# Patient Record
Sex: MALE | Race: OTHER | NOT HISPANIC OR LATINO | URBAN - METROPOLITAN AREA
[De-identification: names, ages, dates, MRNs, and addresses within clinical notes are randomized per-mention and may not be internally consistent; named-entity substitution may affect disease eponyms.]

---

## 2019-02-23 ENCOUNTER — INPATIENT (INPATIENT)
Facility: HOSPITAL | Age: 64
LOS: 2 days | Discharge: HOME | End: 2019-02-26
Attending: FAMILY MEDICINE | Admitting: FAMILY MEDICINE
Payer: MEDICARE

## 2019-02-23 VITALS
OXYGEN SATURATION: 100 % | HEART RATE: 103 BPM | WEIGHT: 158.07 LBS | DIASTOLIC BLOOD PRESSURE: 96 MMHG | SYSTOLIC BLOOD PRESSURE: 156 MMHG | RESPIRATION RATE: 18 BRPM | HEIGHT: 71 IN | TEMPERATURE: 98 F

## 2019-02-23 DIAGNOSIS — Z95.1 PRESENCE OF AORTOCORONARY BYPASS GRAFT: Chronic | ICD-10-CM

## 2019-02-23 DIAGNOSIS — Z98.890 OTHER SPECIFIED POSTPROCEDURAL STATES: Chronic | ICD-10-CM

## 2019-02-23 DIAGNOSIS — I73.9 PERIPHERAL VASCULAR DISEASE, UNSPECIFIED: ICD-10-CM

## 2019-02-23 DIAGNOSIS — I10 ESSENTIAL (PRIMARY) HYPERTENSION: ICD-10-CM

## 2019-02-23 DIAGNOSIS — I82.432 ACUTE EMBOLISM AND THROMBOSIS OF LEFT POPLITEAL VEIN: ICD-10-CM

## 2019-02-23 LAB
ALBUMIN SERPL ELPH-MCNC: 3.5 G/DL — SIGNIFICANT CHANGE UP (ref 3.3–5)
ALP SERPL-CCNC: 107 U/L — SIGNIFICANT CHANGE UP (ref 40–120)
ALT FLD-CCNC: 12 U/L — SIGNIFICANT CHANGE UP (ref 12–78)
ANION GAP SERPL CALC-SCNC: 6 MMOL/L — SIGNIFICANT CHANGE UP (ref 5–17)
APTT BLD: 36.8 SEC — HIGH (ref 27.5–36.3)
AST SERPL-CCNC: 14 U/L — LOW (ref 15–37)
BASOPHILS # BLD AUTO: 0.04 K/UL — SIGNIFICANT CHANGE UP (ref 0–0.2)
BASOPHILS NFR BLD AUTO: 0.8 % — SIGNIFICANT CHANGE UP (ref 0–2)
BILIRUB SERPL-MCNC: 0.6 MG/DL — SIGNIFICANT CHANGE UP (ref 0.2–1.2)
BUN SERPL-MCNC: 20 MG/DL — SIGNIFICANT CHANGE UP (ref 7–23)
CALCIUM SERPL-MCNC: 8.8 MG/DL — SIGNIFICANT CHANGE UP (ref 8.5–10.1)
CHLORIDE SERPL-SCNC: 110 MMOL/L — HIGH (ref 96–108)
CO2 SERPL-SCNC: 26 MMOL/L — SIGNIFICANT CHANGE UP (ref 22–31)
CREAT SERPL-MCNC: 0.83 MG/DL — SIGNIFICANT CHANGE UP (ref 0.5–1.3)
EOSINOPHIL # BLD AUTO: 0.03 K/UL — SIGNIFICANT CHANGE UP (ref 0–0.5)
EOSINOPHIL NFR BLD AUTO: 0.6 % — SIGNIFICANT CHANGE UP (ref 0–6)
GLUCOSE SERPL-MCNC: 85 MG/DL — SIGNIFICANT CHANGE UP (ref 70–99)
HCT VFR BLD CALC: 33.9 % — LOW (ref 39–50)
HGB BLD-MCNC: 11.4 G/DL — LOW (ref 13–17)
IMM GRANULOCYTES NFR BLD AUTO: 0.4 % — SIGNIFICANT CHANGE UP (ref 0–1.5)
INR BLD: 1.17 RATIO — HIGH (ref 0.88–1.16)
LYMPHOCYTES # BLD AUTO: 1.6 K/UL — SIGNIFICANT CHANGE UP (ref 1–3.3)
LYMPHOCYTES # BLD AUTO: 31.2 % — SIGNIFICANT CHANGE UP (ref 13–44)
MCHC RBC-ENTMCNC: 28.8 PG — SIGNIFICANT CHANGE UP (ref 27–34)
MCHC RBC-ENTMCNC: 33.6 GM/DL — SIGNIFICANT CHANGE UP (ref 32–36)
MCV RBC AUTO: 85.6 FL — SIGNIFICANT CHANGE UP (ref 80–100)
MONOCYTES # BLD AUTO: 0.51 K/UL — SIGNIFICANT CHANGE UP (ref 0–0.9)
MONOCYTES NFR BLD AUTO: 9.9 % — SIGNIFICANT CHANGE UP (ref 2–14)
NEUTROPHILS # BLD AUTO: 2.93 K/UL — SIGNIFICANT CHANGE UP (ref 1.8–7.4)
NEUTROPHILS NFR BLD AUTO: 57.1 % — SIGNIFICANT CHANGE UP (ref 43–77)
NRBC # BLD: 0 /100 WBCS — SIGNIFICANT CHANGE UP (ref 0–0)
PLATELET # BLD AUTO: 192 K/UL — SIGNIFICANT CHANGE UP (ref 150–400)
POTASSIUM SERPL-MCNC: 3.9 MMOL/L — SIGNIFICANT CHANGE UP (ref 3.5–5.3)
POTASSIUM SERPL-SCNC: 3.9 MMOL/L — SIGNIFICANT CHANGE UP (ref 3.5–5.3)
PROT SERPL-MCNC: 7.5 GM/DL — SIGNIFICANT CHANGE UP (ref 6–8.3)
PROTHROM AB SERPL-ACNC: 13.1 SEC — HIGH (ref 10–12.9)
RBC # BLD: 3.96 M/UL — LOW (ref 4.2–5.8)
RBC # FLD: 13.2 % — SIGNIFICANT CHANGE UP (ref 10.3–14.5)
SODIUM SERPL-SCNC: 142 MMOL/L — SIGNIFICANT CHANGE UP (ref 135–145)
WBC # BLD: 5.13 K/UL — SIGNIFICANT CHANGE UP (ref 3.8–10.5)
WBC # FLD AUTO: 5.13 K/UL — SIGNIFICANT CHANGE UP (ref 3.8–10.5)

## 2019-02-23 PROCEDURE — 93010 ELECTROCARDIOGRAM REPORT: CPT

## 2019-02-23 PROCEDURE — 93971 EXTREMITY STUDY: CPT | Mod: 26,LT

## 2019-02-23 PROCEDURE — 99285 EMERGENCY DEPT VISIT HI MDM: CPT

## 2019-02-23 PROCEDURE — 73620 X-RAY EXAM OF FOOT: CPT | Mod: 26,LT

## 2019-02-23 RX ORDER — ATORVASTATIN CALCIUM 80 MG/1
10 TABLET, FILM COATED ORAL AT BEDTIME
Qty: 0 | Refills: 0 | Status: DISCONTINUED | OUTPATIENT
Start: 2019-02-23 | End: 2019-02-26

## 2019-02-23 RX ORDER — SODIUM CHLORIDE 9 MG/ML
3 INJECTION INTRAMUSCULAR; INTRAVENOUS; SUBCUTANEOUS EVERY 8 HOURS
Qty: 0 | Refills: 0 | Status: DISCONTINUED | OUTPATIENT
Start: 2019-02-23 | End: 2019-02-26

## 2019-02-23 RX ORDER — MORPHINE SULFATE 50 MG/1
2 CAPSULE, EXTENDED RELEASE ORAL THREE TIMES A DAY
Qty: 0 | Refills: 0 | Status: DISCONTINUED | OUTPATIENT
Start: 2019-02-23 | End: 2019-02-26

## 2019-02-23 RX ORDER — ENOXAPARIN SODIUM 100 MG/ML
70 INJECTION SUBCUTANEOUS EVERY 12 HOURS
Qty: 0 | Refills: 0 | Status: DISCONTINUED | OUTPATIENT
Start: 2019-02-23 | End: 2019-02-25

## 2019-02-23 RX ORDER — LISINOPRIL 2.5 MG/1
20 TABLET ORAL DAILY
Qty: 0 | Refills: 0 | Status: DISCONTINUED | OUTPATIENT
Start: 2019-02-23 | End: 2019-02-26

## 2019-02-23 RX ORDER — CLOPIDOGREL BISULFATE 75 MG/1
75 TABLET, FILM COATED ORAL DAILY
Qty: 0 | Refills: 0 | Status: DISCONTINUED | OUTPATIENT
Start: 2019-02-23 | End: 2019-02-26

## 2019-02-23 RX ORDER — IBUPROFEN 200 MG
600 TABLET ORAL EVERY 6 HOURS
Qty: 0 | Refills: 0 | Status: DISCONTINUED | OUTPATIENT
Start: 2019-02-23 | End: 2019-02-26

## 2019-02-23 RX ORDER — MORPHINE SULFATE 50 MG/1
4 CAPSULE, EXTENDED RELEASE ORAL ONCE
Qty: 0 | Refills: 0 | Status: DISCONTINUED | OUTPATIENT
Start: 2019-02-23 | End: 2019-02-23

## 2019-02-23 RX ORDER — SODIUM CHLORIDE 9 MG/ML
1000 INJECTION INTRAMUSCULAR; INTRAVENOUS; SUBCUTANEOUS ONCE
Qty: 0 | Refills: 0 | Status: COMPLETED | OUTPATIENT
Start: 2019-02-23 | End: 2019-02-23

## 2019-02-23 RX ORDER — DOCUSATE SODIUM 100 MG
100 CAPSULE ORAL DAILY
Qty: 0 | Refills: 0 | Status: DISCONTINUED | OUTPATIENT
Start: 2019-02-23 | End: 2019-02-26

## 2019-02-23 RX ORDER — AMLODIPINE BESYLATE 2.5 MG/1
5 TABLET ORAL DAILY
Qty: 0 | Refills: 0 | Status: DISCONTINUED | OUTPATIENT
Start: 2019-02-23 | End: 2019-02-26

## 2019-02-23 RX ORDER — ACETAMINOPHEN 500 MG
1000 TABLET ORAL ONCE
Qty: 0 | Refills: 0 | Status: COMPLETED | OUTPATIENT
Start: 2019-02-23 | End: 2019-02-23

## 2019-02-23 RX ADMIN — MORPHINE SULFATE 2 MILLIGRAM(S): 50 CAPSULE, EXTENDED RELEASE ORAL at 23:58

## 2019-02-23 RX ADMIN — ENOXAPARIN SODIUM 70 MILLIGRAM(S): 100 INJECTION SUBCUTANEOUS at 23:58

## 2019-02-23 RX ADMIN — MORPHINE SULFATE 4 MILLIGRAM(S): 50 CAPSULE, EXTENDED RELEASE ORAL at 19:10

## 2019-02-23 RX ADMIN — SODIUM CHLORIDE 2000 MILLILITER(S): 9 INJECTION INTRAMUSCULAR; INTRAVENOUS; SUBCUTANEOUS at 19:10

## 2019-02-23 RX ADMIN — ATORVASTATIN CALCIUM 10 MILLIGRAM(S): 80 TABLET, FILM COATED ORAL at 23:58

## 2019-02-23 RX ADMIN — Medication 400 MILLIGRAM(S): at 22:32

## 2019-02-23 RX ADMIN — Medication 1000 MILLIGRAM(S): at 22:47

## 2019-02-23 NOTE — ED ADULT NURSE REASSESSMENT NOTE - NS ED NURSE REASSESS COMMENT FT1
Report given to Melissa of throughput for admissions. Pt c/o pain to LLE at this time, Dr. Cerna notified. Will put in pain medication orders. Report given to Katia of throughput for admissions. Pt c/o pain to LLE at this time, Dr. Cerna notified. Will put in pain medication orders.

## 2019-02-23 NOTE — ED PROVIDER NOTE - OBJECTIVE STATEMENT
62y/o w/ PMHx of quadruple bypass, CVA (in 1987), HTN lisinopril, amlodipine), HC (on atorvastatin) BIBA w/ worsening pain in left leg.  Pain is associated w/ swelling and erythema starting one week ago.  Pt has not had similar episodes in the past.  Pt has surgery on the same leg for saphenous vein harvest for CABG.  Surgical site is well healed. Denies N/V/D, fever or chills. Pt has been taking Ibuprophen for pain w/o relief of symptoms.  Pain is limiting pts ability to ambulate normally. Social: pt started living w/ his son x 4mo ago. NKDA

## 2019-02-23 NOTE — H&P ADULT - PROBLEM SELECTOR PLAN 3
Continue statin, plavix  Vascular consult in the setting of acute DVT exclude concomittant arterial insufficiency  Smoking cessation counselled

## 2019-02-23 NOTE — ED ADULT NURSE REASSESSMENT NOTE - NSIMPLEMENTINTERV_GEN_ALL_ED
Implemented All Fall with Harm Risk Interventions:  [place] to call system. Call bell, personal items and telephone within reach. Instruct patient to call for assistance. Room bathroom lighting operational. Non-slip footwear when patient is off stretcher. Physically safe environment: no spills, clutter or unnecessary equipment. Stretcher in lowest position, wheels locked, appropriate side rails in place. Provide visual cue, wrist band, yellow gown, etc. Monitor gait and stability. Monitor for mental status changes and reorient to person, place, and time. Review medications for side effects contributing to fall risk. Reinforce activity limits and safety measures with patient and family. Provide visual clues: red socks.

## 2019-02-23 NOTE — H&P ADULT - PSH
S/P quadruple vessel bypass S/P quadruple vessel bypass    S/P transsphenoidal selective adenomectomy
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
## 2019-02-23 NOTE — ED PROVIDER NOTE - ENMT, MLM
Airway patent, Nasal mucosa clear. Mouth with mildly dry mucosa. and multiple missing teeth. Throat has no vesicles, no oropharyngeal exudates and uvula is midline.

## 2019-02-23 NOTE — ED PROVIDER NOTE - PROGRESS NOTE DETAILS
Anupam TRUJILLO for ED attending, Dr. Mcmahon. Doppler performed and unable to appreciated DP blood flow, will request vascular consult Scribadam TRUJILLO for ED attending, Dr. Mcmahon.  U/S is positive for DVT in left popliteal vein.  Plan for admission to hospitalist.

## 2019-02-23 NOTE — H&P ADULT - PROBLEM SELECTOR PLAN 1
Lovenox weight based BID  Vascular consult  Pain control Lovenox weight based BID  Vascular consult  Pain control  S/W and Case mgt for dispo planning

## 2019-02-23 NOTE — H&P ADULT - NSHPLABSRESULTS_GEN_ALL_CORE
11.4   5.13  )-----------( 192      ( 23 Feb 2019 19:29 )             33.9     02-23    142  |  110<H>  |  20  ----------------------------<  85  3.9   |  26  |  0.83    Ca    8.8      23 Feb 2019 19:29    TPro  7.5  /  Alb  3.5  /  TBili  0.6  /  DBili  x   /  AST  14<L>  /  ALT  12  /  AlkPhos  107  02-23        LIVER FUNCTIONS - ( 23 Feb 2019 19:29 )  Alb: 3.5 g/dL / Pro: 7.5 gm/dL / ALK PHOS: 107 U/L / ALT: 12 U/L / AST: 14 U/L / GGT: x           PT/INR - ( 23 Feb 2019 19:29 )   PT: 13.1 sec;   INR: 1.17 ratio         PTT - ( 23 Feb 2019 19:29 )  PTT:36.8 sec
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
## 2019-02-23 NOTE — ED PROVIDER NOTE - SKIN, MLM
Skin normal color for race, warm, dry.  Left leg w/ well healed incision from prior saphenous vein harvest.  Edema and erythema surrounding lower half of calf, ankle and dorsum of foot.

## 2019-02-23 NOTE — ED ADULT NURSE REASSESSMENT NOTE - NS ED NURSE REASSESS COMMENT FT1
Received report from Janna DUTTA. Received pt awake A&Ox3 sitting erect in bed with son at bedside. Pt c/o LLE pain x 1 week. History of venous insufficiency and graft. Will reassess pain scale after Morphine IVP. No acute distress noted at this time. VSS. Safety/fall precautions in place. Pending lab results and final ED disposition.

## 2019-02-23 NOTE — ED ADULT NURSE REASSESSMENT NOTE - NS ED NURSE REASSESS COMMENT FT1
Pt received from TRA Chavez. Patient resting comfortably in bed. Safety and comfort maintained. Will continue to monitor.
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
## 2019-02-23 NOTE — ED PROVIDER NOTE - CLINICAL SUMMARY MEDICAL DECISION MAKING FREE TEXT BOX
Pt w/ PMHx of CVA and CABG (quadruple bypass) w/ left leg pain.  Pt not known to have diabetes.  No recent trauma to the leg.  R/o cellulitis vs osteomyelitis vs DVT vs PVD.  Plan: labs, XR, doppler, U/S, pain management.
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
## 2019-02-23 NOTE — ED ADULT NURSE NOTE - NSIMPLEMENTINTERV_GEN_ALL_ED
Implemented All Fall with Harm Risk Interventions:  [place] to call system. Call bell, personal items and telephone within reach. Instruct patient to call for assistance. Room bathroom lighting operational. Non-slip footwear when patient is off stretcher. Physically safe environment: no spills, clutter or unnecessary equipment. Stretcher in lowest position, wheels locked, appropriate side rails in place. Provide visual cue, wrist band, yellow gown, etc. Monitor gait and stability. Monitor for mental status changes and reorient to person, place, and time. Review medications for side effects contributing to fall risk. Reinforce activity limits and safety measures with patient and family. Provide visual clues: red socks.
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
## 2019-02-23 NOTE — H&P ADULT - NSHPSOCIALHISTORY_GEN_ALL_CORE
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

## 2019-02-23 NOTE — H&P ADULT - NSHPPHYSICALEXAM_GEN_ALL_CORE
HEENT:   pupils equal and reactive, EOMI, no oropharyngeal lesions, erythema, exudates, oral thrush    NECK:   supple, no carotid bruits, no palpable lymph nodes, no thyromegaly    CV:  +S1, +S2, regular, no murmurs or rubs    RESP:   lungs clear to auscultation bilaterally, no wheezing, rales, rhonchi, good air entry bilaterally    BREAST:  not examined    GI:  abdomen soft, non-tender, non-distended, normal BS, no bruits, no abdominal masses, no palpable masses    RECTAL:  not examined    :  not examined    MSK:   normal muscle tone, no atrophy, no rigidity, no contractions    EXT:   left LE with swelling, erythema and calf tenderness    VASCULAR:  pulses equal and symmetric in the upper and lower extremities    NEURO:  AAOX3, no focal neurological deficits, follows all commands, able to move extremities spontaneously    SKIN:  no ulcers, lesions or rashes HEENT:   pupils equal and reactive, EOMI, no oropharyngeal lesions, erythema, exudates, oral thrush    NECK:   supple, no carotid bruits, no palpable lymph nodes, no thyromegaly    CV:  +S1, +S2, regular, no murmurs or rubs    RESP:   lungs clear to auscultation bilaterally, no wheezing, rales, rhonchi, good air entry bilaterally    BREAST:  not examined    GI:  abdomen soft, non-tender, non-distended, normal BS, no bruits, no abdominal masses, no palpable masses    RECTAL:  not examined    :  not examined    MSK:   normal muscle tone, no atrophy, no rigidity, no contractions    EXT:   left LE with swelling, erythema and ankle/lower leg tenderness, medial scar ankle to just above left knee, muscle atrophy and contracture left upper and lower extremities. s/p toe smputation with hyperpigmentation and swelling of foot    NEURO:  AAOX3, no focal neurological deficits, follows all commands, able to move right extremities spontaneously, left extremities paretic    SKIN:  no ulcers, lesions or rashes

## 2019-02-24 LAB
CHOLEST SERPL-MCNC: 177 MG/DL — SIGNIFICANT CHANGE UP (ref 10–199)
HBA1C BLD-MCNC: 5.6 % — SIGNIFICANT CHANGE UP (ref 4–5.6)
HCV AB S/CO SERPL IA: 0.15 S/CO — SIGNIFICANT CHANGE UP (ref 0–0.79)
HCV AB SERPL-IMP: SIGNIFICANT CHANGE UP
HDLC SERPL-MCNC: 50 MG/DL — SIGNIFICANT CHANGE UP
LIPID PNL WITH DIRECT LDL SERPL: 115 MG/DL — HIGH
TOTAL CHOLESTEROL/HDL RATIO MEASUREMENT: 3.5 RATIO — SIGNIFICANT CHANGE UP (ref 3.4–9.6)
TRIGL SERPL-MCNC: 62 MG/DL — SIGNIFICANT CHANGE UP (ref 10–149)

## 2019-02-24 PROCEDURE — 99223 1ST HOSP IP/OBS HIGH 75: CPT

## 2019-02-24 PROCEDURE — 99231 SBSQ HOSP IP/OBS SF/LOW 25: CPT

## 2019-02-24 RX ORDER — MORPHINE SULFATE 50 MG/1
2 CAPSULE, EXTENDED RELEASE ORAL ONCE
Qty: 0 | Refills: 0 | Status: DISCONTINUED | OUTPATIENT
Start: 2019-02-24 | End: 2019-02-24

## 2019-02-24 RX ORDER — TRAMADOL HYDROCHLORIDE 50 MG/1
50 TABLET ORAL ONCE
Qty: 0 | Refills: 0 | Status: DISCONTINUED | OUTPATIENT
Start: 2019-02-24 | End: 2019-02-24

## 2019-02-24 RX ORDER — WARFARIN SODIUM 2.5 MG/1
5 TABLET ORAL DAILY
Qty: 0 | Refills: 0 | Status: DISCONTINUED | OUTPATIENT
Start: 2019-02-24 | End: 2019-02-24

## 2019-02-24 RX ORDER — OXYCODONE AND ACETAMINOPHEN 5; 325 MG/1; MG/1
2 TABLET ORAL EVERY 4 HOURS
Qty: 0 | Refills: 0 | Status: DISCONTINUED | OUTPATIENT
Start: 2019-02-24 | End: 2019-02-26

## 2019-02-24 RX ADMIN — Medication 100 MILLIGRAM(S): at 12:32

## 2019-02-24 RX ADMIN — MORPHINE SULFATE 2 MILLIGRAM(S): 50 CAPSULE, EXTENDED RELEASE ORAL at 18:36

## 2019-02-24 RX ADMIN — MORPHINE SULFATE 2 MILLIGRAM(S): 50 CAPSULE, EXTENDED RELEASE ORAL at 03:30

## 2019-02-24 RX ADMIN — ATORVASTATIN CALCIUM 10 MILLIGRAM(S): 80 TABLET, FILM COATED ORAL at 22:21

## 2019-02-24 RX ADMIN — OXYCODONE AND ACETAMINOPHEN 2 TABLET(S): 5; 325 TABLET ORAL at 17:15

## 2019-02-24 RX ADMIN — MORPHINE SULFATE 2 MILLIGRAM(S): 50 CAPSULE, EXTENDED RELEASE ORAL at 03:35

## 2019-02-24 RX ADMIN — AMLODIPINE BESYLATE 5 MILLIGRAM(S): 2.5 TABLET ORAL at 05:33

## 2019-02-24 RX ADMIN — OXYCODONE AND ACETAMINOPHEN 2 TABLET(S): 5; 325 TABLET ORAL at 17:45

## 2019-02-24 RX ADMIN — TRAMADOL HYDROCHLORIDE 50 MILLIGRAM(S): 50 TABLET ORAL at 03:00

## 2019-02-24 RX ADMIN — OXYCODONE AND ACETAMINOPHEN 2 TABLET(S): 5; 325 TABLET ORAL at 23:57

## 2019-02-24 RX ADMIN — TRAMADOL HYDROCHLORIDE 50 MILLIGRAM(S): 50 TABLET ORAL at 02:09

## 2019-02-24 RX ADMIN — MORPHINE SULFATE 2 MILLIGRAM(S): 50 CAPSULE, EXTENDED RELEASE ORAL at 08:16

## 2019-02-24 RX ADMIN — OXYCODONE AND ACETAMINOPHEN 2 TABLET(S): 5; 325 TABLET ORAL at 12:32

## 2019-02-24 RX ADMIN — SODIUM CHLORIDE 3 MILLILITER(S): 9 INJECTION INTRAMUSCULAR; INTRAVENOUS; SUBCUTANEOUS at 05:33

## 2019-02-24 RX ADMIN — LISINOPRIL 20 MILLIGRAM(S): 2.5 TABLET ORAL at 05:33

## 2019-02-24 RX ADMIN — ENOXAPARIN SODIUM 70 MILLIGRAM(S): 100 INJECTION SUBCUTANEOUS at 12:33

## 2019-02-24 RX ADMIN — CLOPIDOGREL BISULFATE 75 MILLIGRAM(S): 75 TABLET, FILM COATED ORAL at 12:32

## 2019-02-24 RX ADMIN — OXYCODONE AND ACETAMINOPHEN 2 TABLET(S): 5; 325 TABLET ORAL at 13:00

## 2019-02-24 RX ADMIN — OXYCODONE AND ACETAMINOPHEN 2 TABLET(S): 5; 325 TABLET ORAL at 22:18

## 2019-02-24 NOTE — PROGRESS NOTE ADULT - ASSESSMENT
*Acute deep vein thrombosis (DVT) of popliteal vein of left lower extremity.    -continue Lovenox weight based BID  -AC consult   -Vascular consult  -Pain control  -S/W and Case mgt for dispo planning.    *Hypertension - stable  - continue home meds.     *PAD (peripheral artery disease).    -Continue statin, plavix  -Vascular consult in the setting of acute DVT exclude concomittant arterial insufficiency  -Smoking cessation counselled    *DVT ppx  -Lovenox *Acute deep vein thrombosis (DVT) of popliteal vein of left lower extremity.    -continue Lovenox weight based BID - will hold off on coumadin till seen by vascular   -AC consult appreciated   -Vascular consult  -Pain control with percocet and Morphine   -S/W and Case mgt for dispo planning.    *Hypertension - stable  - continue home meds.     *PAD (peripheral artery disease).    -Continue statin, plavix  -Vascular consult in the setting of acute DVT exclude concomittant arterial insufficiency  -Smoking cessation counselled    *DVT ppx  -Lovenox    *Dispo - start coumadin kristofer if cleared by vascular

## 2019-02-24 NOTE — PROVIDER CONTACT NOTE (OTHER) - SITUATION
left message with ans service    note: Dr. Blackburn is covering for Dr. Gomez (they're part of the same Vascular group)

## 2019-02-24 NOTE — PATIENT PROFILE ADULT - FALL HARM RISK
other/coagulation(Bleeding disorder R/T clinical cond/anti-coags)/Pt with history of CVA with left sided weakness
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
## 2019-02-24 NOTE — ED ADULT NURSE REASSESSMENT NOTE - NS ED NURSE REASSESS COMMENT FT1
Patient given yogurt, repositioned in bed. Patient complaining of pain to left foot 9/10. Jian resident notified. Safety and comfort maintained. Will continue to monitor.

## 2019-02-25 ENCOUNTER — TRANSCRIPTION ENCOUNTER (OUTPATIENT)
Age: 64
End: 2019-02-25

## 2019-02-25 PROCEDURE — 99232 SBSQ HOSP IP/OBS MODERATE 35: CPT

## 2019-02-25 RX ORDER — FONDAPARINUX SODIUM 2.5 MG/.5ML
1 INJECTION, SOLUTION SUBCUTANEOUS
Qty: 42 | Refills: 0 | OUTPATIENT
Start: 2019-02-25 | End: 2019-03-17

## 2019-02-25 RX ORDER — RIVAROXABAN 15 MG-20MG
15 KIT ORAL
Qty: 0 | Refills: 0 | Status: DISCONTINUED | OUTPATIENT
Start: 2019-02-25 | End: 2019-02-26

## 2019-02-25 RX ADMIN — OXYCODONE AND ACETAMINOPHEN 2 TABLET(S): 5; 325 TABLET ORAL at 17:28

## 2019-02-25 RX ADMIN — OXYCODONE AND ACETAMINOPHEN 2 TABLET(S): 5; 325 TABLET ORAL at 19:38

## 2019-02-25 RX ADMIN — RIVAROXABAN 15 MILLIGRAM(S): KIT at 17:26

## 2019-02-25 RX ADMIN — SODIUM CHLORIDE 3 MILLILITER(S): 9 INJECTION INTRAMUSCULAR; INTRAVENOUS; SUBCUTANEOUS at 21:57

## 2019-02-25 RX ADMIN — OXYCODONE AND ACETAMINOPHEN 2 TABLET(S): 5; 325 TABLET ORAL at 17:20

## 2019-02-25 RX ADMIN — ATORVASTATIN CALCIUM 10 MILLIGRAM(S): 80 TABLET, FILM COATED ORAL at 21:57

## 2019-02-25 RX ADMIN — AMLODIPINE BESYLATE 5 MILLIGRAM(S): 2.5 TABLET ORAL at 04:59

## 2019-02-25 RX ADMIN — LISINOPRIL 20 MILLIGRAM(S): 2.5 TABLET ORAL at 04:59

## 2019-02-25 RX ADMIN — OXYCODONE AND ACETAMINOPHEN 2 TABLET(S): 5; 325 TABLET ORAL at 05:02

## 2019-02-25 RX ADMIN — OXYCODONE AND ACETAMINOPHEN 2 TABLET(S): 5; 325 TABLET ORAL at 11:35

## 2019-02-25 RX ADMIN — CLOPIDOGREL BISULFATE 75 MILLIGRAM(S): 75 TABLET, FILM COATED ORAL at 11:34

## 2019-02-25 RX ADMIN — SODIUM CHLORIDE 3 MILLILITER(S): 9 INJECTION INTRAMUSCULAR; INTRAVENOUS; SUBCUTANEOUS at 08:02

## 2019-02-25 RX ADMIN — SODIUM CHLORIDE 3 MILLILITER(S): 9 INJECTION INTRAMUSCULAR; INTRAVENOUS; SUBCUTANEOUS at 13:26

## 2019-02-25 RX ADMIN — ENOXAPARIN SODIUM 70 MILLIGRAM(S): 100 INJECTION SUBCUTANEOUS at 04:32

## 2019-02-25 RX ADMIN — Medication 100 MILLIGRAM(S): at 11:34

## 2019-02-25 NOTE — DISCHARGE NOTE ADULT - CARE PROVIDERS DIRECT ADDRESSES
[email or web address]

## 2019-02-25 NOTE — DISCHARGE NOTE ADULT - CARE PROVIDER_API CALL
Sanford Alexis)  Internal Medicine  [address] [place] [address]  Phone: [phone]  Fax: [phone]  Follow Up Time:

## 2019-02-25 NOTE — DISCHARGE NOTE ADULT - PLAN OF CARE
please continue xarelto as eprescribed , you would need to see your primary doctor within 1 week to obtain the prescription of xarelto 20mg daily to be started around march 18th after completion of 42 doses  of xarelto 15mg , if you develop any fever or worsening of swelling or pain , call 911 see discharge summary please continue xarelto as eprescribed , you would need to see your primary doctor within 1 week to obtain the prescription of xarelto 20mg daily to be started around march 18th after completion of 42 doses  of xarelto 15mg , if you develop any fever or worsening of swelling or pain , call 911,please follow up with your vascular surgeon and hematologist as outpatient to decide duration of the xarelto treatment

## 2019-02-25 NOTE — DISCHARGE NOTE ADULT - INSTRUCTIONS
Follow up with MD in 1-2 weeks. Continue to take xarelto twice a day, do not skip doses. Monitor for signs of bleeding- blood in stool, excessive bruising and if any fall occurs notify MD immediately.

## 2019-02-25 NOTE — DISCHARGE NOTE ADULT - PATIENT PORTAL LINK FT
You can access the [facility] Patient Portal, offered by [facility], by registering with the following website: [email or web address]
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
## 2019-02-25 NOTE — DISCHARGE NOTE ADULT - MEDICATION SUMMARY - MEDICATIONS TO TAKE
I will START or STAY ON the medications listed below when I get home from the hospital:    lisinopril 20 mg oral tablet  -- 1 tab(s) by mouth once a day  -- Indication: For .    Xarelto 15 mg oral tablet  -- 1 tab(s) by mouth 2 times a day for 21 days, then switch to xarelto  20mg by mouth daily from the 22nd  day    -- Indication: For .DVT left lower extremity    amlodipine-atorvastatin 5 mg-10 mg oral tablet  -- 1 tab(s) by mouth once a day  -- Indication: For .    Plavix 75 mg oral tablet  -- 1 tab(s) by mouth once a day  -- Indication: For . I will START or STAY ON the medications listed below when I get home from the hospital:    lisinopril 20 mg oral tablet  -- 1 tab(s) by mouth once a day  -- Indication: For .    Xarelto 15 mg oral tablet  -- 1 tab(s) by mouth 2 times a day for 21 days, then switch to xarelto  20mg by mouth daily from the 22nd  day    -- Indication: For .DVT left lower extremity, you would need to get prescription for xarelto 20mg daily from your primary doctor    amlodipine-atorvastatin 5 mg-10 mg oral tablet  -- 1 tab(s) by mouth once a day  -- Indication: For .    Plavix 75 mg oral tablet  -- 1 tab(s) by mouth once a day  -- Indication: For .

## 2019-02-25 NOTE — PHARMACOTHERAPY INTERVENTION NOTE - COMMENTS
Confirmed with patient's pharmacy regarding medication coverage. Recommend changing to DOAC for treatment of DVT. Will f/u regarding prior authorization of selected DOAC if necessary.

## 2019-02-25 NOTE — DISCHARGE NOTE ADULT - CARE PLAN
Principal Discharge DX:	Acute deep vein thrombosis (DVT) of popliteal vein of left lower extremity  Goal:	please continue xarelto as eprescribed , you would need to see your primary doctor within 1 week to obtain the prescription of xarelto 20mg daily to be started around march 18th after completion of 42 doses  of xarelto 15mg , if you develop any fever or worsening of swelling or pain , call 911  Assessment and plan of treatment:	see discharge summary Principal Discharge DX:	Acute deep vein thrombosis (DVT) of popliteal vein of left lower extremity  Goal:	please continue xarelto as eprescribed , you would need to see your primary doctor within 1 week to obtain the prescription of xarelto 20mg daily to be started around march 18th after completion of 42 doses  of xarelto 15mg , if you develop any fever or worsening of swelling or pain , call 911,please follow up with your vascular surgeon and hematologist as outpatient to decide duration of the xarelto treatment  Assessment and plan of treatment:	see discharge summary

## 2019-02-26 VITALS
HEART RATE: 103 BPM | OXYGEN SATURATION: 96 % | RESPIRATION RATE: 19 BRPM | TEMPERATURE: 99 F | SYSTOLIC BLOOD PRESSURE: 115 MMHG | DIASTOLIC BLOOD PRESSURE: 76 MMHG

## 2019-02-26 PROCEDURE — 99232 SBSQ HOSP IP/OBS MODERATE 35: CPT

## 2019-02-26 RX ORDER — INFLUENZA VIRUS VACCINE 15; 15; 15; 15 UG/.5ML; UG/.5ML; UG/.5ML; UG/.5ML
0.5 SUSPENSION INTRAMUSCULAR ONCE
Qty: 0 | Refills: 0 | Status: COMPLETED | OUTPATIENT
Start: 2019-02-26 | End: 2019-02-26

## 2019-02-26 RX ADMIN — RIVAROXABAN 15 MILLIGRAM(S): KIT at 08:38

## 2019-02-26 RX ADMIN — AMLODIPINE BESYLATE 5 MILLIGRAM(S): 2.5 TABLET ORAL at 05:56

## 2019-02-26 RX ADMIN — SODIUM CHLORIDE 3 MILLILITER(S): 9 INJECTION INTRAMUSCULAR; INTRAVENOUS; SUBCUTANEOUS at 05:57

## 2019-02-26 RX ADMIN — LISINOPRIL 20 MILLIGRAM(S): 2.5 TABLET ORAL at 05:56

## 2019-02-26 RX ADMIN — OXYCODONE AND ACETAMINOPHEN 2 TABLET(S): 5; 325 TABLET ORAL at 06:35

## 2019-02-26 RX ADMIN — INFLUENZA VIRUS VACCINE 0.5 MILLILITER(S): 15; 15; 15; 15 SUSPENSION INTRAMUSCULAR at 12:13

## 2019-02-26 RX ADMIN — Medication 100 MILLIGRAM(S): at 08:38

## 2019-02-26 RX ADMIN — OXYCODONE AND ACETAMINOPHEN 2 TABLET(S): 5; 325 TABLET ORAL at 06:05

## 2019-02-26 RX ADMIN — CLOPIDOGREL BISULFATE 75 MILLIGRAM(S): 75 TABLET, FILM COATED ORAL at 08:38

## 2019-02-26 RX ADMIN — MORPHINE SULFATE 2 MILLIGRAM(S): 50 CAPSULE, EXTENDED RELEASE ORAL at 12:12

## 2019-02-26 RX ADMIN — Medication 600 MILLIGRAM(S): at 08:43
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
## 2019-03-01 ENCOUNTER — EMERGENCY (EMERGENCY)
Facility: HOSPITAL | Age: 64
LOS: 0 days | Discharge: NURSING, REHAB OR LONG-TERM CARE | End: 2019-03-02
Attending: EMERGENCY MEDICINE | Admitting: FAMILY MEDICINE
Payer: MEDICARE

## 2019-03-01 VITALS
WEIGHT: 210.1 LBS | SYSTOLIC BLOOD PRESSURE: 136 MMHG | HEART RATE: 98 BPM | TEMPERATURE: 99 F | HEIGHT: 75 IN | DIASTOLIC BLOOD PRESSURE: 73 MMHG | RESPIRATION RATE: 20 BRPM | OXYGEN SATURATION: 96 %

## 2019-03-01 DIAGNOSIS — I82.439 ACUTE EMBOLISM AND THROMBOSIS OF UNSPECIFIED POPLITEAL VEIN: ICD-10-CM

## 2019-03-01 DIAGNOSIS — S90.413A: ICD-10-CM

## 2019-03-01 DIAGNOSIS — Z89.419 ACQUIRED ABSENCE OF UNSPECIFIED GREAT TOE: ICD-10-CM

## 2019-03-01 DIAGNOSIS — I10 ESSENTIAL (PRIMARY) HYPERTENSION: ICD-10-CM
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
## 2019-03-01 DIAGNOSIS — F03.90 UNSPECIFIED DEMENTIA WITHOUT BEHAVIORAL DISTURBANCE: ICD-10-CM

## 2019-03-01 DIAGNOSIS — Z98.890 OTHER SPECIFIED POSTPROCEDURAL STATES: Chronic | ICD-10-CM

## 2019-03-01 DIAGNOSIS — Z95.1 PRESENCE OF AORTOCORONARY BYPASS GRAFT: Chronic | ICD-10-CM

## 2019-03-01 DIAGNOSIS — I69.354 HEMIPLEGIA AND HEMIPARESIS FOLLOWING CEREBRAL INFARCTION AFFECTING LEFT NON-DOMINANT SIDE: ICD-10-CM
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
## 2019-03-01 DIAGNOSIS — F17.210 NICOTINE DEPENDENCE, CIGARETTES, UNCOMPLICATED: ICD-10-CM

## 2019-03-01 DIAGNOSIS — Z99.3 DEPENDENCE ON WHEELCHAIR: ICD-10-CM

## 2019-03-01 DIAGNOSIS — E78.5 HYPERLIPIDEMIA, UNSPECIFIED: ICD-10-CM

## 2019-03-01 PROCEDURE — 99285 EMERGENCY DEPT VISIT HI MDM: CPT | Mod: 25

## 2019-03-01 RX ORDER — ACETAMINOPHEN 500 MG
1000 TABLET ORAL ONCE
Qty: 0 | Refills: 0 | Status: COMPLETED | OUTPATIENT
Start: 2019-03-01 | End: 2019-03-01

## 2019-03-01 RX ADMIN — Medication 1000 MILLIGRAM(S): at 22:49

## 2019-03-01 RX ADMIN — Medication 100 MILLIGRAM(S): at 22:49
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
## 2019-03-01 NOTE — ED PROVIDER NOTE - PROGRESS NOTE DETAILS
Foot discoloration is chronic and at patient's baseline.  His friend who has been taking care of him is reporting there are barriers to his care including insurance issues and transportation issues.  Patient to be seen by social work in the morning -Calvin Willoughby PA-C CC:  PT consult ordered as requested by Case Management/SW.  Pt's routine AM meds ordered. CC:  Case Management has arranged for D/C short-term rehab at [facility] pending PT clearance.  PT aware of ED consult pending.

## 2019-03-01 NOTE — ED ADULT NURSE NOTE - NSIMPLEMENTINTERV_GEN_ALL_ED
Implemented All Fall Risk Interventions:  [place] to call system. Call bell, personal items and telephone within reach. Instruct patient to call for assistance. Room bathroom lighting operational. Non-slip footwear when patient is off stretcher. Physically safe environment: no spills, clutter or unnecessary equipment. Stretcher in lowest position, wheels locked, appropriate side rails in place. Provide visual cue, wrist band, yellow gown, etc. Monitor gait and stability. Monitor for mental status changes and reorient to person, place, and time. Review medications for side effects contributing to fall risk. Reinforce activity limits and safety measures with patient and family.

## 2019-03-01 NOTE — ED PROVIDER NOTE - NSFOLLOWUPINSTRUCTIONS_ED_ALL_ED_FT
ED evaluation and management discussed with the patient and family (if available) in detail.  Close PMD follow up encouraged.  Strict ED return instructions discussed in detail and patient given the opportunity to ask any questions about their discharge diagnosis and instructions. Patient verbalized understanding. ED evaluation and management discussed with the patient and family (if available) in detail.  Close PMD follow up encouraged.  Strict ED return instructions discussed in detail and patient given the opportunity to ask any questions about their discharge diagnosis and instructions. Patient verbalized understanding.    Medications:    1.  Amlodipine-atorvastatin  5mg-10 mg, 1 tab orally daily  2.  Lisinopril 20 mg 1 tab orally daily  3. Plavix 75 mg 1 tab orally daily  4. Xarelto 15 mg 1 tab orally twice a day until 3/18 (21 days from 2/26), then 20 mg 1 tab orally once a day - pt has + DVT recently diagnosed.    Dressing changes L foot/toe daily.    Physical therapy for weight transfer training, pt is wheelchair dependent.    Follow other discharge instructions as provided.
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
## 2019-03-01 NOTE — ED PROVIDER NOTE - SKIN, MLM
+area of evulsed skin on great toe, unable to palpate +area of evulsed skin on great toe, unable to palpate. contracted lower extremeties Hyperpigmented toes

## 2019-03-01 NOTE — ED PROVIDER NOTE - CLINICAL SUMMARY MEDICAL DECISION MAKING FREE TEXT BOX
pt with h/o poor circulation here for bleeding after bandage removal from left foot. with toe evulsion. pt's toes are black but will obtain prior visit history. pt with h/o poor circulation here for bleeding after bandage removal from left foot. with toe evulsion. pt's toes are black but will obtain prior visit history.Pt unable to see MD today for regular appt due to insurance problem. Pt states brought to ER only for skin avulsion. No increased pain or fever. Will do dressing, labs needs Social Work consult.

## 2019-03-01 NOTE — ED PROVIDER NOTE - CARE PLAN
Principal Discharge DX:	Toe abrasion, left, initial encounter  Secondary Diagnosis:	Dressing change Principal Discharge DX:	Toe abrasion, left, initial encounter  Secondary Diagnosis:	Dressing change  Secondary Diagnosis:	Wheelchair dependent  Secondary Diagnosis:	DVT (deep venous thrombosis)

## 2019-03-01 NOTE — ED PROVIDER NOTE - UNABLE TO OBTAIN
Pt is a poor historian- unable to obtain accurate HPI. Dementia Pt is a poor historian- unable to obtain accurate ROS.

## 2019-03-01 NOTE — ED PROVIDER NOTE - CONSTITUTIONAL, MLM
normal... Well appearing, well nourished, awake, alert, oriented to person, place, time/situation and in no apparent distress. Chronically ill appearing, thin, awake, alert, oriented to person, place, time/situation and in no apparent distress.

## 2019-03-01 NOTE — ED PROVIDER NOTE - ATTENDING CONTRIBUTION TO CARE
Pt eval, treatment and plan contemporaneously with MAULIK Willoughby. Agree with notes. Radha CANCINO

## 2019-03-01 NOTE — ED ADULT NURSE NOTE - CHIEF COMPLAINT QUOTE
patient complaining of left foot pain and bleeding after "picking at it." bleeding controlled with gauze and cornelio. patient on eliquis. denies trauma or injury.

## 2019-03-02 VITALS
DIASTOLIC BLOOD PRESSURE: 67 MMHG | OXYGEN SATURATION: 97 % | TEMPERATURE: 99 F | HEART RATE: 97 BPM | RESPIRATION RATE: 16 BRPM | SYSTOLIC BLOOD PRESSURE: 117 MMHG
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
## 2019-03-02 PROBLEM — E78.5 HYPERLIPIDEMIA, UNSPECIFIED: Chronic | Status: ACTIVE | Noted: 2019-02-23

## 2019-03-02 PROBLEM — I63.9 CEREBRAL INFARCTION, UNSPECIFIED: Chronic | Status: ACTIVE | Noted: 2019-02-23

## 2019-03-02 LAB
ALBUMIN SERPL ELPH-MCNC: 3.2 G/DL — LOW (ref 3.3–5)
ALP SERPL-CCNC: 98 U/L — SIGNIFICANT CHANGE UP (ref 40–120)
ALT FLD-CCNC: 23 U/L — SIGNIFICANT CHANGE UP (ref 12–78)
ANION GAP SERPL CALC-SCNC: 9 MMOL/L — SIGNIFICANT CHANGE UP (ref 5–17)
APTT BLD: 47.5 SEC — HIGH (ref 27.5–36.3)
AST SERPL-CCNC: 28 U/L — SIGNIFICANT CHANGE UP (ref 15–37)
BASOPHILS # BLD AUTO: 0.05 K/UL — SIGNIFICANT CHANGE UP (ref 0–0.2)
BASOPHILS NFR BLD AUTO: 0.7 % — SIGNIFICANT CHANGE UP (ref 0–2)
BILIRUB SERPL-MCNC: 0.5 MG/DL — SIGNIFICANT CHANGE UP (ref 0.2–1.2)
BUN SERPL-MCNC: 29 MG/DL — HIGH (ref 7–23)
CALCIUM SERPL-MCNC: 8.8 MG/DL — SIGNIFICANT CHANGE UP (ref 8.5–10.1)
CHLORIDE SERPL-SCNC: 108 MMOL/L — SIGNIFICANT CHANGE UP (ref 96–108)
CO2 SERPL-SCNC: 26 MMOL/L — SIGNIFICANT CHANGE UP (ref 22–31)
CREAT SERPL-MCNC: 0.89 MG/DL — SIGNIFICANT CHANGE UP (ref 0.5–1.3)
EOSINOPHIL # BLD AUTO: 0.11 K/UL — SIGNIFICANT CHANGE UP (ref 0–0.5)
EOSINOPHIL NFR BLD AUTO: 1.5 % — SIGNIFICANT CHANGE UP (ref 0–6)
GLUCOSE SERPL-MCNC: 93 MG/DL — SIGNIFICANT CHANGE UP (ref 70–99)
HCT VFR BLD CALC: 33.6 % — LOW (ref 39–50)
HGB BLD-MCNC: 11.1 G/DL — LOW (ref 13–17)
IMM GRANULOCYTES NFR BLD AUTO: 0.1 % — SIGNIFICANT CHANGE UP (ref 0–1.5)
INR BLD: 1.83 RATIO — HIGH (ref 0.88–1.16)
LYMPHOCYTES # BLD AUTO: 1.9 K/UL — SIGNIFICANT CHANGE UP (ref 1–3.3)
LYMPHOCYTES # BLD AUTO: 26.1 % — SIGNIFICANT CHANGE UP (ref 13–44)
MCHC RBC-ENTMCNC: 28.5 PG — SIGNIFICANT CHANGE UP (ref 27–34)
MCHC RBC-ENTMCNC: 33 GM/DL — SIGNIFICANT CHANGE UP (ref 32–36)
MCV RBC AUTO: 86.4 FL — SIGNIFICANT CHANGE UP (ref 80–100)
MONOCYTES # BLD AUTO: 0.74 K/UL — SIGNIFICANT CHANGE UP (ref 0–0.9)
MONOCYTES NFR BLD AUTO: 10.2 % — SIGNIFICANT CHANGE UP (ref 2–14)
NEUTROPHILS # BLD AUTO: 4.48 K/UL — SIGNIFICANT CHANGE UP (ref 1.8–7.4)
NEUTROPHILS NFR BLD AUTO: 61.4 % — SIGNIFICANT CHANGE UP (ref 43–77)
NRBC # BLD: 0 /100 WBCS — SIGNIFICANT CHANGE UP (ref 0–0)
PLATELET # BLD AUTO: 220 K/UL — SIGNIFICANT CHANGE UP (ref 150–400)
POTASSIUM SERPL-MCNC: 3.8 MMOL/L — SIGNIFICANT CHANGE UP (ref 3.5–5.3)
POTASSIUM SERPL-SCNC: 3.8 MMOL/L — SIGNIFICANT CHANGE UP (ref 3.5–5.3)
PROT SERPL-MCNC: 7.6 GM/DL — SIGNIFICANT CHANGE UP (ref 6–8.3)
PROTHROM AB SERPL-ACNC: 20.7 SEC — HIGH (ref 10–12.9)
RBC # BLD: 3.89 M/UL — LOW (ref 4.2–5.8)
RBC # FLD: 13.2 % — SIGNIFICANT CHANGE UP (ref 10.3–14.5)
SODIUM SERPL-SCNC: 143 MMOL/L — SIGNIFICANT CHANGE UP (ref 135–145)
WBC # BLD: 7.29 K/UL — SIGNIFICANT CHANGE UP (ref 3.8–10.5)
WBC # FLD AUTO: 7.29 K/UL — SIGNIFICANT CHANGE UP (ref 3.8–10.5)

## 2019-03-02 RX ORDER — LISINOPRIL 2.5 MG/1
20 TABLET ORAL ONCE
Qty: 0 | Refills: 0 | Status: COMPLETED | OUTPATIENT
Start: 2019-03-02 | End: 2019-03-02

## 2019-03-02 RX ORDER — ATORVASTATIN CALCIUM 80 MG/1
10 TABLET, FILM COATED ORAL ONCE
Qty: 0 | Refills: 0 | Status: COMPLETED | OUTPATIENT
Start: 2019-03-02 | End: 2019-03-02

## 2019-03-02 RX ORDER — CLOPIDOGREL BISULFATE 75 MG/1
75 TABLET, FILM COATED ORAL ONCE
Qty: 0 | Refills: 0 | Status: COMPLETED | OUTPATIENT
Start: 2019-03-02 | End: 2019-03-02

## 2019-03-02 RX ORDER — RIVAROXABAN 15 MG-20MG
15 KIT ORAL ONCE
Qty: 0 | Refills: 0 | Status: COMPLETED | OUTPATIENT
Start: 2019-03-02 | End: 2019-03-02

## 2019-03-02 RX ORDER — AMLODIPINE BESYLATE 2.5 MG/1
5 TABLET ORAL ONCE
Qty: 0 | Refills: 0 | Status: COMPLETED | OUTPATIENT
Start: 2019-03-02 | End: 2019-03-02

## 2019-03-02 RX ADMIN — ATORVASTATIN CALCIUM 10 MILLIGRAM(S): 80 TABLET, FILM COATED ORAL at 09:21

## 2019-03-02 RX ADMIN — AMLODIPINE BESYLATE 5 MILLIGRAM(S): 2.5 TABLET ORAL at 09:21

## 2019-03-02 RX ADMIN — Medication 100 MILLIGRAM(S): at 06:11

## 2019-03-02 RX ADMIN — LISINOPRIL 20 MILLIGRAM(S): 2.5 TABLET ORAL at 09:21

## 2019-03-02 RX ADMIN — Medication 1000 MILLIGRAM(S): at 00:01

## 2019-03-02 RX ADMIN — CLOPIDOGREL BISULFATE 75 MILLIGRAM(S): 75 TABLET, FILM COATED ORAL at 09:21

## 2019-03-02 RX ADMIN — RIVAROXABAN 15 MILLIGRAM(S): KIT at 09:21

## 2019-03-02 NOTE — PHYSICAL THERAPY INITIAL EVALUATION ADULT - PREDICTED DURATION OF THERAPY (DAYS/WKS), PT EVAL
No need for continued skilled acute care PT at present as the pt's plan is to d/c to [facility] [place]. If the pt is admitted to , please re-order PT.
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
## 2019-03-02 NOTE — PHYSICAL THERAPY INITIAL EVALUATION ADULT - GENERAL OBSERVATIONS, REHAB EVAL
The pt was pleasant and cooperative, received on a stretcher in the ED, left LE flexion contraction, left forefoot wrapped in bandaging, left UE with increased flexion tone, the pt was eager to attempt PT evaluation.

## 2019-03-02 NOTE — PHYSICAL THERAPY INITIAL EVALUATION ADULT - ASR EQUIP NEEDS DISCH PT EVAL
sliding board/gait belt/pt may benefit from education on be to w/c strategies that would maximize his independence, consider sliding board vs sit to stand assistive devices, etc.
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
## 2019-03-02 NOTE — PHYSICAL THERAPY INITIAL EVALUATION ADULT - MODALITIES TREATMENT COMMENTS
The pt responded well to therex review in bed, able to follow all commands, the pt responded well to transfer tx and bed mobility tx, the pt responded well to sitting balance training and sit to stand training/ sequencing. The pt was most appropriate for a 2 person max assist bed to chair transfer, the pt would also be able to tolerated a 1 person max squat pivot from a high surface. The pt will benefit from further Sub acute Rehab for Physical and Occupational Therapy in order to maximize sit to stand and bed to w/c transfer ability in order to return to his baseline of 1 person assist bed to w/c. The pt was returned to supine and adjusted for comfort in bed, the pt was left in NAD. Case discussed with BAM/GILLES/RN. Pt denied any c/o pain t/o tx. The pt responded well to therex review in bed, able to follow all commands, the pt responded well to transfer tx and bed mobility tx, the pt responded well to sitting balance training and sit to stand training/ sequencing. The pt was most appropriate for a 2 person max assist bed to chair transfer, the pt would also be able to tolerated a 1 person max squat pivot from a high surface. The pt will benefit from further Sub acute Rehab for Physical and Occupational Therapy in order to maximize sit to stand and bed to w/c transfer ability in order to return to his baseline of 1 person assist bed to w/c. The pt was returned to supine and adjusted for comfort in bed, the pt was left in NAD. Case discussed with BAM/GILLES/RN.

## 2019-03-02 NOTE — PHARMACOTHERAPY INTERVENTION NOTE - COMMENTS
Pt is a poor historian, simply states he started taking the 'new blood thinner' Verified meds with Shiv. No answer from emergency contact members. All meds were recently picked up in January or February
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
## 2019-03-02 NOTE — PHYSICAL THERAPY INITIAL EVALUATION ADULT - RANGE OF MOTION EXAMINATION, REHAB EVAL
left LE flexion contracture, left UE increased flexion tone but able to complete functional AROM of the left UE. The pt's right UE/LE grossly WFL A/AA/PROM

## 2019-03-02 NOTE — PHYSICAL THERAPY INITIAL EVALUATION ADULT - PATIENT/FAMILY/SIGNIFICANT OTHER GOALS STATEMENT, PT EVAL
The pt reports that he was able to use a RW and stand and transfer to a w/c with the assistance of his son a few weeks/ months ago. The pt reports that he has not been able to get OOB to the w/c well for a few weeks now and hopes to go to [facility].

## 2019-03-02 NOTE — ED ADULT NURSE REASSESSMENT NOTE - NS ED NURSE REASSESS COMMENT FT1
patient resting in bed comfortably. sleeping, arousable to voice. no complaints while awake. awaiting social work in AM. will continue to monitor.

## 2019-03-04 DIAGNOSIS — Z99.3 DEPENDENCE ON WHEELCHAIR: ICD-10-CM

## 2019-03-04 DIAGNOSIS — Z79.02 LONG TERM (CURRENT) USE OF ANTITHROMBOTICS/ANTIPLATELETS: ICD-10-CM

## 2019-03-04 DIAGNOSIS — I82.432 ACUTE EMBOLISM AND THROMBOSIS OF LEFT POPLITEAL VEIN: ICD-10-CM

## 2019-03-04 DIAGNOSIS — I69.354 HEMIPLEGIA AND HEMIPARESIS FOLLOWING CEREBRAL INFARCTION AFFECTING LEFT NON-DOMINANT SIDE: ICD-10-CM

## 2019-03-04 DIAGNOSIS — M79.605 PAIN IN LEFT LEG: ICD-10-CM

## 2019-03-04 DIAGNOSIS — F17.210 NICOTINE DEPENDENCE, CIGARETTES, UNCOMPLICATED: ICD-10-CM

## 2019-03-04 DIAGNOSIS — Z95.820 PERIPHERAL VASCULAR ANGIOPLASTY STATUS WITH IMPLANTS AND GRAFTS: ICD-10-CM

## 2019-03-04 DIAGNOSIS — I73.9 PERIPHERAL VASCULAR DISEASE, UNSPECIFIED: ICD-10-CM

## 2019-03-04 DIAGNOSIS — Z89.422 ACQUIRED ABSENCE OF OTHER LEFT TOE(S): ICD-10-CM

## 2019-03-04 DIAGNOSIS — E78.5 HYPERLIPIDEMIA, UNSPECIFIED: ICD-10-CM

## 2019-03-04 DIAGNOSIS — Z95.1 PRESENCE OF AORTOCORONARY BYPASS GRAFT: ICD-10-CM

## 2019-03-04 DIAGNOSIS — I10 ESSENTIAL (PRIMARY) HYPERTENSION: ICD-10-CM

## 2019-03-24 ENCOUNTER — INPATIENT (INPATIENT)
Facility: HOSPITAL | Age: 64
LOS: 3 days | Discharge: OTHER INSTITUTION | DRG: 240 | End: 2019-03-28
Attending: INTERNAL MEDICINE | Admitting: INTERNAL MEDICINE
Payer: MEDICARE

## 2019-03-24 ENCOUNTER — TRANSCRIPTION ENCOUNTER (OUTPATIENT)
Age: 64
End: 2019-03-24

## 2019-03-24 VITALS
SYSTOLIC BLOOD PRESSURE: 112 MMHG | DIASTOLIC BLOOD PRESSURE: 70 MMHG | RESPIRATION RATE: 16 BRPM | OXYGEN SATURATION: 97 % | WEIGHT: 184.97 LBS | TEMPERATURE: 99 F | HEART RATE: 98 BPM

## 2019-03-24 DIAGNOSIS — Z95.1 PRESENCE OF AORTOCORONARY BYPASS GRAFT: Chronic | ICD-10-CM

## 2019-03-24 DIAGNOSIS — Z74.09 OTHER REDUCED MOBILITY: ICD-10-CM

## 2019-03-24 DIAGNOSIS — I96 GANGRENE, NOT ELSEWHERE CLASSIFIED: ICD-10-CM

## 2019-03-24 DIAGNOSIS — I80.229 PHLEBITIS AND THROMBOPHLEBITIS OF UNSPECIFIED POPLITEAL VEIN: ICD-10-CM

## 2019-03-24 DIAGNOSIS — I73.9 PERIPHERAL VASCULAR DISEASE, UNSPECIFIED: ICD-10-CM

## 2019-03-24 DIAGNOSIS — I10 ESSENTIAL (PRIMARY) HYPERTENSION: ICD-10-CM

## 2019-03-24 DIAGNOSIS — E46 UNSPECIFIED PROTEIN-CALORIE MALNUTRITION: ICD-10-CM

## 2019-03-24 DIAGNOSIS — Z29.9 ENCOUNTER FOR PROPHYLACTIC MEASURES, UNSPECIFIED: ICD-10-CM

## 2019-03-24 DIAGNOSIS — Z98.890 OTHER SPECIFIED POSTPROCEDURAL STATES: Chronic | ICD-10-CM

## 2019-03-24 LAB
ALBUMIN SERPL ELPH-MCNC: 2.2 G/DL — LOW (ref 3.3–5)
ALP SERPL-CCNC: 167 U/L — HIGH (ref 40–120)
ALT FLD-CCNC: 33 U/L — SIGNIFICANT CHANGE UP (ref 12–78)
ANION GAP SERPL CALC-SCNC: 8 MMOL/L — SIGNIFICANT CHANGE UP (ref 5–17)
APTT BLD: 42.8 SEC — HIGH (ref 27.5–36.3)
AST SERPL-CCNC: 56 U/L — HIGH (ref 15–37)
BASOPHILS # BLD AUTO: 0.08 K/UL — SIGNIFICANT CHANGE UP (ref 0–0.2)
BASOPHILS NFR BLD AUTO: 0.6 % — SIGNIFICANT CHANGE UP (ref 0–2)
BILIRUB SERPL-MCNC: 0.5 MG/DL — SIGNIFICANT CHANGE UP (ref 0.2–1.2)
BLD GP AB SCN SERPL QL: SIGNIFICANT CHANGE UP
BUN SERPL-MCNC: 24 MG/DL — HIGH (ref 7–23)
CALCIUM SERPL-MCNC: 8.8 MG/DL — SIGNIFICANT CHANGE UP (ref 8.5–10.1)
CHLORIDE SERPL-SCNC: 103 MMOL/L — SIGNIFICANT CHANGE UP (ref 96–108)
CO2 SERPL-SCNC: 26 MMOL/L — SIGNIFICANT CHANGE UP (ref 22–31)
CREAT SERPL-MCNC: 1 MG/DL — SIGNIFICANT CHANGE UP (ref 0.5–1.3)
EOSINOPHIL # BLD AUTO: 0.06 K/UL — SIGNIFICANT CHANGE UP (ref 0–0.5)
EOSINOPHIL NFR BLD AUTO: 0.5 % — SIGNIFICANT CHANGE UP (ref 0–6)
GLUCOSE SERPL-MCNC: 108 MG/DL — HIGH (ref 70–99)
HCT VFR BLD CALC: 27.8 % — LOW (ref 39–50)
HCV AB S/CO SERPL IA: 0.15 S/CO — SIGNIFICANT CHANGE UP (ref 0–0.79)
HCV AB SERPL-IMP: SIGNIFICANT CHANGE UP
HGB BLD-MCNC: 8.7 G/DL — LOW (ref 13–17)
IMM GRANULOCYTES NFR BLD AUTO: 0.9 % — SIGNIFICANT CHANGE UP (ref 0–1.5)
INR BLD: 1.42 RATIO — HIGH (ref 0.88–1.16)
LACTATE SERPL-SCNC: 1.6 MMOL/L — SIGNIFICANT CHANGE UP (ref 0.7–2)
LYMPHOCYTES # BLD AUTO: 17 % — SIGNIFICANT CHANGE UP (ref 13–44)
LYMPHOCYTES # BLD AUTO: 2.16 K/UL — SIGNIFICANT CHANGE UP (ref 1–3.3)
MCHC RBC-ENTMCNC: 27.5 PG — SIGNIFICANT CHANGE UP (ref 27–34)
MCHC RBC-ENTMCNC: 31.3 GM/DL — LOW (ref 32–36)
MCV RBC AUTO: 88 FL — SIGNIFICANT CHANGE UP (ref 80–100)
MONOCYTES # BLD AUTO: 0.81 K/UL — SIGNIFICANT CHANGE UP (ref 0–0.9)
MONOCYTES NFR BLD AUTO: 6.4 % — SIGNIFICANT CHANGE UP (ref 2–14)
NEUTROPHILS # BLD AUTO: 9.52 K/UL — HIGH (ref 1.8–7.4)
NEUTROPHILS NFR BLD AUTO: 74.6 % — SIGNIFICANT CHANGE UP (ref 43–77)
NRBC # BLD: 0 /100 WBCS — SIGNIFICANT CHANGE UP (ref 0–0)
PLATELET # BLD AUTO: 519 K/UL — HIGH (ref 150–400)
POTASSIUM SERPL-MCNC: 4.5 MMOL/L — SIGNIFICANT CHANGE UP (ref 3.5–5.3)
POTASSIUM SERPL-SCNC: 4.5 MMOL/L — SIGNIFICANT CHANGE UP (ref 3.5–5.3)
PROT SERPL-MCNC: 8.4 G/DL — HIGH (ref 6–8.3)
PROTHROM AB SERPL-ACNC: 16.2 SEC — HIGH (ref 10–12.9)
RBC # BLD: 3.16 M/UL — LOW (ref 4.2–5.8)
RBC # FLD: 13.8 % — SIGNIFICANT CHANGE UP (ref 10.3–14.5)
SODIUM SERPL-SCNC: 137 MMOL/L — SIGNIFICANT CHANGE UP (ref 135–145)
WBC # BLD: 12.74 K/UL — HIGH (ref 3.8–10.5)
WBC # FLD AUTO: 12.74 K/UL — HIGH (ref 3.8–10.5)

## 2019-03-24 PROCEDURE — 71045 X-RAY EXAM CHEST 1 VIEW: CPT | Mod: 26

## 2019-03-24 PROCEDURE — 93970 EXTREMITY STUDY: CPT | Mod: 26

## 2019-03-24 PROCEDURE — 93010 ELECTROCARDIOGRAM REPORT: CPT

## 2019-03-24 PROCEDURE — 27590 AMPUTATE LEG AT THIGH: CPT | Mod: AS

## 2019-03-24 PROCEDURE — 99285 EMERGENCY DEPT VISIT HI MDM: CPT

## 2019-03-24 RX ORDER — VANCOMYCIN HCL 1 G
1000 VIAL (EA) INTRAVENOUS ONCE
Qty: 0 | Refills: 0 | Status: COMPLETED | OUTPATIENT
Start: 2019-03-24 | End: 2019-03-24

## 2019-03-24 RX ORDER — LISINOPRIL 2.5 MG/1
20 TABLET ORAL DAILY
Qty: 0 | Refills: 0 | Status: DISCONTINUED | OUTPATIENT
Start: 2019-03-24 | End: 2019-03-25

## 2019-03-24 RX ORDER — VANCOMYCIN HCL 1 G
1000 VIAL (EA) INTRAVENOUS EVERY 12 HOURS
Qty: 0 | Refills: 0 | Status: DISCONTINUED | OUTPATIENT
Start: 2019-03-24 | End: 2019-03-24

## 2019-03-24 RX ORDER — VANCOMYCIN HCL 1 G
1500 VIAL (EA) INTRAVENOUS EVERY 12 HOURS
Qty: 0 | Refills: 0 | Status: DISCONTINUED | OUTPATIENT
Start: 2019-03-24 | End: 2019-03-25

## 2019-03-24 RX ORDER — PIPERACILLIN AND TAZOBACTAM 4; .5 G/20ML; G/20ML
3.38 INJECTION, POWDER, LYOPHILIZED, FOR SOLUTION INTRAVENOUS ONCE
Qty: 0 | Refills: 0 | Status: COMPLETED | OUTPATIENT
Start: 2019-03-24 | End: 2019-03-24

## 2019-03-24 RX ORDER — ATORVASTATIN CALCIUM 80 MG/1
10 TABLET, FILM COATED ORAL AT BEDTIME
Qty: 0 | Refills: 0 | Status: DISCONTINUED | OUTPATIENT
Start: 2019-03-24 | End: 2019-03-25

## 2019-03-24 RX ORDER — ACETAMINOPHEN 500 MG
650 TABLET ORAL EVERY 6 HOURS
Qty: 0 | Refills: 0 | Status: DISCONTINUED | OUTPATIENT
Start: 2019-03-24 | End: 2019-03-25

## 2019-03-24 RX ORDER — FAMOTIDINE 10 MG/ML
20 INJECTION INTRAVENOUS DAILY
Qty: 0 | Refills: 0 | Status: DISCONTINUED | OUTPATIENT
Start: 2019-03-24 | End: 2019-03-25

## 2019-03-24 RX ORDER — HEPARIN SODIUM 5000 [USP'U]/ML
5000 INJECTION INTRAVENOUS; SUBCUTANEOUS EVERY 12 HOURS
Qty: 0 | Refills: 0 | Status: DISCONTINUED | OUTPATIENT
Start: 2019-03-24 | End: 2019-03-24

## 2019-03-24 RX ORDER — LACTOBACILLUS ACIDOPHILUS 100MM CELL
1 CAPSULE ORAL EVERY 8 HOURS
Qty: 0 | Refills: 0 | Status: DISCONTINUED | OUTPATIENT
Start: 2019-03-24 | End: 2019-03-25

## 2019-03-24 RX ORDER — AMLODIPINE BESYLATE 2.5 MG/1
5 TABLET ORAL DAILY
Qty: 0 | Refills: 0 | Status: DISCONTINUED | OUTPATIENT
Start: 2019-03-24 | End: 2019-03-25

## 2019-03-24 RX ORDER — PIPERACILLIN AND TAZOBACTAM 4; .5 G/20ML; G/20ML
3.38 INJECTION, POWDER, LYOPHILIZED, FOR SOLUTION INTRAVENOUS EVERY 8 HOURS
Qty: 0 | Refills: 0 | Status: DISCONTINUED | OUTPATIENT
Start: 2019-03-24 | End: 2019-03-25

## 2019-03-24 RX ORDER — OXYCODONE AND ACETAMINOPHEN 5; 325 MG/1; MG/1
2 TABLET ORAL EVERY 4 HOURS
Qty: 0 | Refills: 0 | Status: DISCONTINUED | OUTPATIENT
Start: 2019-03-24 | End: 2019-03-25

## 2019-03-24 RX ORDER — TRAMADOL HYDROCHLORIDE 50 MG/1
50 TABLET ORAL EVERY 6 HOURS
Qty: 0 | Refills: 0 | Status: DISCONTINUED | OUTPATIENT
Start: 2019-03-24 | End: 2019-03-25

## 2019-03-24 RX ORDER — OXYCODONE AND ACETAMINOPHEN 5; 325 MG/1; MG/1
2 TABLET ORAL EVERY 4 HOURS
Qty: 0 | Refills: 0 | Status: DISCONTINUED | OUTPATIENT
Start: 2019-03-24 | End: 2019-03-24

## 2019-03-24 RX ORDER — SODIUM CHLORIDE 9 MG/ML
2600 INJECTION INTRAMUSCULAR; INTRAVENOUS; SUBCUTANEOUS ONCE
Qty: 0 | Refills: 0 | Status: COMPLETED | OUTPATIENT
Start: 2019-03-24 | End: 2019-03-24

## 2019-03-24 RX ORDER — SODIUM CHLORIDE 9 MG/ML
1000 INJECTION, SOLUTION INTRAVENOUS
Qty: 0 | Refills: 0 | Status: DISCONTINUED | OUTPATIENT
Start: 2019-03-24 | End: 2019-03-25

## 2019-03-24 RX ADMIN — TRAMADOL HYDROCHLORIDE 50 MILLIGRAM(S): 50 TABLET ORAL at 12:14

## 2019-03-24 RX ADMIN — OXYCODONE AND ACETAMINOPHEN 2 TABLET(S): 5; 325 TABLET ORAL at 22:10

## 2019-03-24 RX ADMIN — FAMOTIDINE 20 MILLIGRAM(S): 10 INJECTION INTRAVENOUS at 12:51

## 2019-03-24 RX ADMIN — ATORVASTATIN CALCIUM 10 MILLIGRAM(S): 80 TABLET, FILM COATED ORAL at 21:10

## 2019-03-24 RX ADMIN — Medication 250 MILLIGRAM(S): at 11:29

## 2019-03-24 RX ADMIN — Medication 1 TABLET(S): at 14:34

## 2019-03-24 RX ADMIN — PIPERACILLIN AND TAZOBACTAM 200 GRAM(S): 4; .5 INJECTION, POWDER, LYOPHILIZED, FOR SOLUTION INTRAVENOUS at 10:50

## 2019-03-24 RX ADMIN — OXYCODONE AND ACETAMINOPHEN 2 TABLET(S): 5; 325 TABLET ORAL at 14:18

## 2019-03-24 RX ADMIN — TRAMADOL HYDROCHLORIDE 50 MILLIGRAM(S): 50 TABLET ORAL at 11:29

## 2019-03-24 RX ADMIN — OXYCODONE AND ACETAMINOPHEN 2 TABLET(S): 5; 325 TABLET ORAL at 21:10

## 2019-03-24 RX ADMIN — OXYCODONE AND ACETAMINOPHEN 2 TABLET(S): 5; 325 TABLET ORAL at 15:20

## 2019-03-24 RX ADMIN — SODIUM CHLORIDE 50 MILLILITER(S): 9 INJECTION, SOLUTION INTRAVENOUS at 14:19

## 2019-03-24 RX ADMIN — Medication 166.67 MILLIGRAM(S): at 23:44

## 2019-03-24 RX ADMIN — SODIUM CHLORIDE 2600 MILLILITER(S): 9 INJECTION INTRAMUSCULAR; INTRAVENOUS; SUBCUTANEOUS at 10:50

## 2019-03-24 RX ADMIN — PIPERACILLIN AND TAZOBACTAM 25 GRAM(S): 4; .5 INJECTION, POWDER, LYOPHILIZED, FOR SOLUTION INTRAVENOUS at 18:13

## 2019-03-24 RX ADMIN — Medication 1 TABLET(S): at 21:10

## 2019-03-24 NOTE — CONSULT NOTE ADULT - ASSESSMENT
The patient has severe PAD of the left leg with gangrene of the left foot coupled with a flexion contracture of the left knee. The patient needs a left AKA amputation. The patient and his son were given informed consent.

## 2019-03-24 NOTE — H&P ADULT - NSICDXPASTMEDICALHX_GEN_ALL_CORE_FT
PAST MEDICAL HISTORY:  DVT (deep venous thrombosis)     Hyperlipidemia     Hypertension     PVD (peripheral vascular disease)

## 2019-03-24 NOTE — CHART NOTE - NSCHARTNOTEFT_GEN_A_CORE
Patient examined and chart reviewed. The patient is scheduled for a left AKA amputation tomorrow. The patients son Festus  was given informed consent. The case was discussed with Dr. Oropeza and Dr. Clark. Full CONSULT TO FOLLOW

## 2019-03-24 NOTE — ED PROVIDER NOTE - OBJECTIVE STATEMENT
64 yo male with H/O DVT (deep venous thrombosis)    Hyperlipidemia    Hypertension and  PVD (peripheral vascular disease) who was sent here today from SNF for evaluation of worsening necrotic left foot now foul smelling. Scheduled for elective amputation tomorrow. No fever, chills, SOB and or pain in foot.

## 2019-03-24 NOTE — H&P ADULT - HISTORY OF PRESENT ILLNESS
62 yo male with H/O DVT (deep venous thrombosis ,nearly occlusive popliteal )on Xarelto   ,Hyperlipidemia  ,Hypertension and PVD (peripheral vascular disease) ,PAD ,LLE popliteal bypass ,s/p left 5th toe amputation ,CVA in 1987 with L hemiparesis and contractures ,hx of CAD , Quadriple bypass , left foot toe avulsion .  Patient  was sent  today from [facility]  SNF for evaluation of worsening necrotic left foot now foul smelling with gangrenous changes ,followed b y wound care team in facility and vascular sx evaluation was recommended . Seen by Dr Stanley in the office 2 days ago and scheduled for elective amputation tomorrow . XARELTO and PLAVIX are on hold since 03/22 No fever, chills, SOB and or pain in foot Patient was recently hospitalized in [facility] with LLE bleeding ( after he pulle dthe skin from left foot  Admitted for septic workup and evaluation,send blood and urine cx,serial lactate levels,monitor vitals closley,ivfs hydration,monitor urine output and renal profile,iv abx initiated Palliative care consult requested ,to discuss advance directives and complete MOLST 64 yo male with H/O DVT (deep venous thrombosis ,nearly occlusive popliteal )on Xarelto   ,Hyperlipidemia  ,Hypertension and PVD (peripheral vascular disease) ,PAD ,LLE popliteal bypass ,s/p left 5th toe amputation ,CVA in 1987 with L hemiparesis and contractures ,hx of CAD , Quadriple bypass , left foot toe avulsion .  Patient  was sent  today from [facility]  SNF for evaluation of worsening necrotic left foot now foul smelling with gangrenous changes ,followed b y wound care team in facility and vascular sx evaluation was recommended . Seen by Dr Stanley in the office 2 days ago and scheduled for elective amputation tomorrow . XARELTO and PLAVIX are on hold since 03/22 No fever, chills, SOB and or pain in foot Patient was recently hospitalized in [facility] with LLE bleeding ( after he pulled the skin from left foot ) Admitted for septic workup and evaluation,send blood and urine cx,serial lactate levels,monitor vitals closley,ivfs hydration,monitor urine output and renal profile,iv abx initiated Palliative care consult requested ,to discuss advance directives and complete MOLST

## 2019-03-24 NOTE — H&P ADULT - NSHPLABSRESULTS_GEN_ALL_CORE
< from: Xray Chest 1 View-PORTABLE IMMEDIATE (03.24.19 @ 10:25) >    EXAM:  XR CHEST PORTABLE IMMED 1V                            PROCEDURE DATE:  03/24/2019          INTERPRETATION:  HISTORY:    ;    ; Sepsis;      TECHNIQUE: Portable frontal view of the chest, 1 view.  COMPARISON: none.  FINDINGS:     HEART: normal in size   LUNGS: free of consolidation or effusion.    OSSEOUS STRUCTURES:: degenerative changes    IMPRESSION:   No infiltrates.    < end of copied text >

## 2019-03-24 NOTE — H&P ADULT - ATTENDING COMMENTS
62 yo male with H/O DVT (deep venous thrombosis ,nearly occlusive popliteal )on Xarelto   ,Hyperlipidemia  ,Hypertension and PVD (peripheral vascular disease) ,PAD ,LLE popliteal bypass ,s/p left 5th toe amputation ,CVA in 1987 with L hemiparesis and contractures ,hx of CAD , Quadriple bypass , left foot toe avulsion .  Patient  was sent  today from [facility]  SNF for evaluation of worsening necrotic left foot now foul smelling with gangrenous changes ,followed b y wound care team in facility and vascular sx evaluation was recommended . Seen by Dr Stanley in the office 2 days ago and scheduled for elective amputation tomorrow . XARELTO and PLAVIX are on hold since 03/22 No fever, chills, SOB and or pain in foot Patient was recently hospitalized in [facility] with LLE bleeding ( after he pulle dthe skin from left foot  Admitted for septic workup and evaluation,send blood and urine cx,serial lactate levels,monitor vitals closley,ivfs hydration,monitor urine output and renal profile,iv abx initiated Palliative care consult requested ,to discuss advance directives and complete MOLST .MEDICALLY OPTIMIZED FOR OR AND CARDIOLOGY CLEARANCE IS REQUESTED

## 2019-03-24 NOTE — ED ADULT NURSE NOTE - OBJECTIVE STATEMENT
62 y/o male sent in from [facility] for elective amputation od left foot. pt states for the past three weeks he has experienced gangrene and was sent in today for iv abx and admission. he denies nausea vomiting fever chills chest pain sob headache abdominal pain and urinary problems

## 2019-03-24 NOTE — CONSULT NOTE ADULT - SUBJECTIVE AND OBJECTIVE BOX
ARELY REARDON [facility]    ALLERGY nka   CONTACT Son Percy R  self          Initial evaluation/Pulmonary Critical Care consultation requested on   3/24/2019 by Dr Blanton from Dr Sotelo   Patient examined chart reviewed    HOSPITAL ADMISSION [facility] 3/24/2019    PATIENT CAME  FROM (if information available)        TYPE OF VISIT      3/24/2019 Initial evaluation/Pulmonary Critical Care consultation requested on   3/24/2019 by Dr Blanton from Dr Sotelo        REASON FOR VISIT/PROBLEM LIST     GANGRENE FOOT   HO DVT  ANEMIA norm3/24/2019 Hb 8.7   Hyperlipidemia    Hypertension    PVD (peripheral vascular disease).  S/P CABG (coronary artery bypass graft).    PATIENT DESCRIPTION PATIENT ARELY REARDON 3/24/2019 ADMISSION [facility]  DR SAMPSON BLANTON          High Risk Travel:  International Travel? No(1)     Preferred Language to Address Healthcare:  · Preferred Language to Address Healthcare English    · Chief Complaint: The patient is a 63y Male complaining of necrotic left foot  · HPI Objective Statement: 64 yo male with H/O DVT (deep venous thrombosis)    Hyperlipidemia    Hypertension and  PVD (peripheral vascular disease) who was sent here today from SNF for evaluation of worsening necrotic left foot now foul smelling. Scheduled for elective amputation tomorrow. No fever, chills, SOB and or pain in foot.  · Wound Type: Gangrene left foot  · Negative Findings: no chills, no drainage, no fever  · Location: foot  · Laterality: left  · Area: generalized  · Timing: gradual onset  · Duration: month(s)  · Aggravated Factors: none  · Relieving Factors: none    PAST MEDICAL/SURGICAL/FAMILY/SOCIAL HISTORY:    Past Medical History:  DVT (deep venous thrombosis)    Hyperlipidemia    Hypertension    PVD (peripheral vascular disease).     Past Surgical History:  S/P CABG (coronary artery bypass graft).    · Social Concerns: None     Alcohol Use History:  · Have you ever consumed alcohol unknown     Tobacco Usage:  · Tobacco Usage Unknown if ever smoked    · Attestation Comment: I have reviewed and confirmed nurses' notes for patient's medications, allergies, medical history, and surgical history.    ALLERGIES AND HOME MEDICATIONS:   Allergies:        Allergies:  No Known Allergies:     Home Medications:   * Patient Currently Takes Medications as of 24-Mar-2019 10:01 documented in Structured Notes  · amLODIPine 5 mg oral tablet: Last Dose Taken:  , 1 tab(s) orally once a day  · lisinopril 20 mg oral tablet: Last Dose Taken:  , 1 tab(s) orally once a day  · Plavix 75 mg oral tablet: Last Dose Taken:  , 1 tab(s) orally once a day  · atorvastatin 10 mg oral tablet: Last Dose Taken:  , 1 tab(s) orally once a day  · Xarelto 20 mg oral tablet: Last Dose Taken:  , 1 tab(s) orally once a day (in the evening)  · traMADol 50 mg oral tablet: Last Dose Taken:    · Augmentin 875 mg-125 mg oral tablet: Last Dose Taken:  , 1 tab(s) orally every 12 hours    REVIEW OF SYSTEMS:    Review of Systems:  · SKIN: - - -  · Skin [+]: Gangrene Left Foot  · ROS STATEMENT: all other ROS negative except as per HPI    PHYSICAL EXAM:   · CONSTITUTIONAL: Chronically ill appearing male, well nourished, awake, alert, oriented to person, place, time/situation and in no apparent distress.  · ENMT: Airway patent  · EYES: Clear bilaterally, pupils equal, round and reactive to light.  · CARDIAC: Normal rate, regular rhythm.  Heart sounds S1, S2.  No murmurs, rubs or gallops.  · RESPIRATORY: Breath sounds clear and equal bilaterally.  · GASTROINTESTINAL: Abdomen soft, non-tender, no guarding.  · MUSCULOSKELETAL: Blackened, dry gangrenous/mummified left foot  · NEUROLOGICAL: No motor or sensory deficits.           VITALS/LABS PATIENT CARON MCGEE        3/24/2019 W 12.7 Hb 8.7 Plt 519  Na 137 K 4.5 CO3 26 Cr 1   3/24/2019 CXR no infiltr

## 2019-03-24 NOTE — CONSULT NOTE ADULT - ASSESSMENT
GLOBAL ISSUE/BEST PRACTICE:      PROBLEM: HOB elevation:   y            PROBLEM: Stress ulcer proph:    pepcid (3/24)                       PROBLEM: VTE prophylaxis:      NA  PROBLEM: Glycemic control:    na  PROBLEM: Nutrition:    DASH (3/24)   PROBLEM: Advanced directive: na     PROBLEM: Allergies:  na    MEDICATIONS  PATIENT   CARON MCGEE      ANTIBIOTICS   Zosyn (3/24)   Vanco 1.2 (3/24)   HYPERTENSION   norvasc 5 (3/24)   lisinopril 20 (3/24)   HLD   atorvastat 10 93/24)   SUP   Pepcid (3/24)   PAIN  Perccocet (3/24)     BRIEF PATIENT  DESCRIPTION  PATIENT ARELY REARDON 3/24/2019 ADMISSION [facility]  DR SAMPSON BLANTON      63 m with PMH DVT hyperlipidemia hypertension CABG PVD was admitted [facility] 3/24/2019 because of progressive fould smelling l foot scheduled for amputation tomorrow Patient is on xarelto for dvt and Pulm consulted for periop management     home meds amlodipine 5 lisinopril 20 plavix 75 atorvastat 10 xarelto 20 tramadol 50 augmentin     ASSESSMENT RECOMMENDATIONS PATIENT ARELY REARDON 3/24/2019 ADMISSION [facility]  DR SAMPSON BLANTON    HO DVT   3/24/2019 Xarelto held   3/24/2019 Will check for evidnce of current dvt Check V duplex legs   3/24/2019 Pt cleared for urgent surgery from Pulm standpoint Will restart xarelto when cleared by surgery         TIME SPENT Over 55 minutes aggregate care time spent on encounter; activities included   direct patient care, counseling and/or coordinating care reviewing notes, lab data/ imaging , discussion with multidisciplinary team/ patient  /family. Risks, benefits, alternatives  discussed in detail.

## 2019-03-24 NOTE — H&P ADULT - ASSESSMENT
62 yo male with H/O DVT (deep venous thrombosis ,nearly occlusive popliteal )on Xarelto   ,Hyperlipidemia  ,Hypertension and PVD (peripheral vascular disease) ,PAD ,LLE popliteal bypass ,s/p left 5th toe amputation ,CVA in 1987 with L hemiparesis and contractures ,hx of CAD , Quadriple bypass , left foot toe avulsion .  Patient  was sent  today from [facility]  SNF for evaluation of worsening necrotic left foot now foul smelling with gangrenous changes ,followed b y wound care team in facility and vascular sx evaluation was recommended . Seen by Dr Stanley in the office 2 days ago and scheduled for elective amputation tomorrow . XARELTO and PLAVIX are on hold since 03/22 No fever, chills, SOB and or pain in foot Patient was recently hospitalized in [facility] with LLE bleeding ( after he pulle dthe skin from left foot  Admitted for septic workup and evaluation,send blood and urine cx,serial lactate levels,monitor vitals closley,ivfs hydration,monitor urine output and renal profile,iv abx initiated Palliative care consult requested ,to discuss advance directives and complete MOLST

## 2019-03-24 NOTE — CONSULT NOTE ADULT - SUBJECTIVE AND OBJECTIVE BOX
History of Present Illness:  63y.o.  Male with a history of PAD  presents with a several week hx. of progressive gangrene  of his left foot. He was recently discharged fro [facility] with gangrene of the  left foot. I was requested  to evaluate his LE circulation     PAST MEDICAL & SURGICAL HISTORY:  PVD (peripheral vascular disease)  DVT (deep venous thrombosis)  Hypertension  Hyperlipidemia  S/P CABG (coronary artery bypass graft)  CVA    Allergies    No Known Allergies    Intolerances        MEDICATIONS  (STANDING):  amLODIPine   Tablet 5 milliGRAM(s) Oral daily  atorvastatin 10 milliGRAM(s) Oral at bedtime  dextrose 5% + sodium chloride 0.45%. 1000 milliLiter(s) (50 mL/Hr) IV Continuous <Continuous>  famotidine    Tablet 20 milliGRAM(s) Oral daily  lactobacillus acidophilus 1 Tablet(s) Oral every 8 hours  lisinopril 20 milliGRAM(s) Oral daily  piperacillin/tazobactam IVPB. 3.375 Gram(s) IV Intermittent every 8 hours  vancomycin  IVPB 1500 milliGRAM(s) IV Intermittent every 12 hours    MEDICATIONS  (PRN):  acetaminophen   Tablet .. 650 milliGRAM(s) Oral every 6 hours PRN Temp greater or equal to 38C (100.4F), Mild Pain (1 - 3)  oxyCODONE    5 mG/acetaminophen 325 mG 2 Tablet(s) Oral every 4 hours PRN Moderate Pain (4 - 6)  traMADol 50 milliGRAM(s) Oral every 6 hours PRN Moderate Pain (4 - 6)      Social History:  Smoking History:denies  Alcohol Use: denies    REVIEW OF SYSTEMS:  CONSTITUTIONAL: No weakness, fevers or chills  EYES/ENT: No visual changes;  No vertigo or throat pain   NECK: No pain or stiffness  RESPIRATORY: No cough, wheezing, hemoptysis; No shortness of breath  CARDIOVASCULAR: No chest pain or palpitations  GASTROINTESTINAL: No abdominal or epigastric pain. No nausea, vomiting, or hematemesis; No diarrhea or constipation. No melena or hematochezia.  GENITOURINARY: No dysuria, frequency or hematuria  NEUROLOGICAL: No numbness or weakness  SKIN: No itching, burning, rashes, or lesions   Vascular:  No lower extremity claudication, pedal rest pain or digital ulcers. +++  gangrene and odor of the left foot  All other review of systems is negative unless indicated above.    PHYSICAL EXAM:  General:  On exam, the patient is alert nontoxic appearing Male in no acute distress.  Vital Signs Last 24 Hrs  T(C): 36.6 (24 Mar 2019 13:48), Max: 37 (24 Mar 2019 09:51)  T(F): 97.9 (24 Mar 2019 13:48), Max: 98.6 (24 Mar 2019 09:51)  HR: 99 (24 Mar 2019 13:48) (89 - 99)  BP: 97/66 (24 Mar 2019 13:48) (97/66 - 115/74)  BP(mean): --  RR: 16 (24 Mar 2019 13:48) (16 - 16)  SpO2: 99% (24 Mar 2019 13:48) (97% - 99%)    Neck:  4+/4+ bilateral carotid pulses; no carotid bruit, no palpable cervical masses.  Heart:  Regular, no murmurs, rubs or gallops.    Lungs:  Clear to auscultation.    Chest:  No chest wall deformities  Symmetrical chest expansion.   Abdomen: Soft and nontender.  No palpable masses.  No rebound, guarding or rigidity.  Extremities: There is complete dry gangrene of his entire left foot.  Right foot is warm, pink with normal capillary refill times.  There are no digital ulcers or heel decubiti.  The calf and thigh muscles are soft and nontender.  There are no palpable cords or limb cellulitis.  Flores's  sign is negative bilaterally.  There is no lower extremity edema, cyanosis, or rubor. There is a severe flexion contracture of the left knee.  On examination of the peripheral pulses:  Left leg femoral pulse is 4/4   , popliteal pulse is 0  ,PT Pulse is 0  , DP Pulse is 0  Right leg femoral pulse is 4/4  ,popliteal pulse is  2/4  , PT Pulse is 2/4  , DP Pulse is 0  Neurological:  There are no motor or sensory deficits in either lower extremity.                          8.7    12.74 )-----------( 519      ( 24 Mar 2019 10:30 )             27.8     03-24    137  |  103  |  24<H>  ----------------------------<  108<H>  4.5   |  26  |  1.00    Ca    8.8      24 Mar 2019 10:30    TPro  8.4<H>  /  Alb  2.2<L>  /  TBili  0.5  /  DBili  x   /  AST  56<H>  /  ALT  33  /  AlkPhos  167<H>  03-24        PT/INR - ( 24 Mar 2019 10:30 )   PT: 16.2 sec;   INR: 1.42 ratio         PTT - ( 24 Mar 2019 10:30 )  PTT:42.8 sec    Radiology:

## 2019-03-24 NOTE — H&P ADULT - BREASTS
Adrenal Insufficiency    Adult Hypothyroidism    Chronic Kidney Disease; Dx 2009    History of Obesity    HTN - Hypertension    Lumbar Spinal Stenosis    Osteoporosis    Pituitary Insufficiency;x 15 yrs. No masses; no nipple discharge

## 2019-03-24 NOTE — CONSULT NOTE ADULT - ASSESSMENT
pt with gangrene of left foof   pvd  s/p cva 1987   s/p dvt   hypertension  dyslipidemia  ekg - rsr - t wave changes  no angina- no chf   cardiac status stable low to  - moderate  risk  for above knee amputation of left leg-discussed with son in grat detail understands the risk involved

## 2019-03-24 NOTE — PATIENT PROFILE ADULT - VISION (WITH CORRECTIVE LENSES IF THE PATIENT USUALLY WEARS THEM):
Normal vision: sees adequately in most situations; can see medication labels, newsprint/wears glasses not with pt

## 2019-03-24 NOTE — ED PROVIDER NOTE - CHIEF COMPLAINT
The patient is a 63y Male complaining of see chief complaint quote. The patient is a 63y Male complaining of necrotic left foot

## 2019-03-24 NOTE — PATIENT PROFILE ADULT - NSPROGENSOURCEINFO_GEN_A_NUR
Pt poor historian, no caregiver with pt at this time.  Transfer paperwork sent with pt from [facility]/patient

## 2019-03-24 NOTE — CONSULT NOTE ADULT - SUBJECTIVE AND OBJECTIVE BOX
CARDIOLOGY CONSULT NOTE    Patient is a 63y Male with a known history of :    HPI:      REVIEW OF SYSTEMS:    CONSTITUTIONAL: No fever, weight loss, or fatigue  EYES: No eye pain, visual disturbances, or discharge  ENMT:  No difficulty hearing, tinnitus, vertigo; No sinus or throat pain  NECK: No pain or stiffness  BREASTS: No pain, masses, or nipple discharge  RESPIRATORY: No cough, wheezing, chills or hemoptysis; No shortness of breath  CARDIOVASCULAR: No chest pain, palpitations, dizziness, or leg swelling  GASTROINTESTINAL: No abdominal or epigastric pain. No nausea, vomiting, or hematemesis; No diarrhea or constipation. No melena or hematochezia.  GENITOURINARY: No dysuria, frequency, hematuria, or incontinence  NEUROLOGICAL: No headaches, memory loss, loss of strength, numbness, or tremors  SKIN: No itching, burning, rashes, or lesions   LYMPH NODES: No enlarged glands  ENDOCRINE: No heat or cold intolerance; No hair loss  MUSCULOSKELETAL: No joint pain or swelling; No muscle, back, or extremity pain  PSYCHIATRIC: No depression, anxiety, mood swings, or difficulty sleeping  HEME/LYMPH: No easy bruising, or bleeding gums  ALLERGY AND IMMUNOLOGIC: No hives or eczema    MEDICATIONS  (STANDING):  amLODIPine   Tablet 5 milliGRAM(s) Oral daily  atorvastatin 10 milliGRAM(s) Oral at bedtime  lisinopril 20 milliGRAM(s) Oral daily  vancomycin  IVPB 1000 milliGRAM(s) IV Intermittent once    MEDICATIONS  (PRN):  traMADol 50 milliGRAM(s) Oral every 6 hours PRN Moderate Pain (4 - 6)      ALLERGIES: No Known Allergies      Social History:     FAMILY HISTORY:      PAST MEDICAL & SURGICAL HISTORY:  PVD (peripheral vascular disease)  DVT (deep venous thrombosis)  Hypertension  Hyperlipidemia  S/P CABG (coronary artery bypass graft)        PHYSICAL EXAMINATION:  -----------------------------  T(C): 37 (03-24-19 @ 09:51), Max: 37 (03-24-19 @ 09:51)  HR: 98 (03-24-19 @ 09:51) (98 - 98)  BP: 112/70 (03-24-19 @ 09:51) (112/70 - 112/70)  RR: 16 (03-24-19 @ 09:51) (16 - 16)  SpO2: 97% (03-24-19 @ 09:51) (97% - 97%)  Wt(kg): --      Weight (kg): 83.9 (03-24 @ 09:51)    Constitutional: well developed, normal appearance, well groomed, well nourished, no deformities and no acute distress.   Eyes: the conjunctiva exhibited no abnormalities and the eyelids demonstrated no xanthelasmas.   HEENT: normal oral mucosa, no oral pallor and no oral cyanosis.   Neck: normal jugular venous A waves present, normal jugular venous V waves present and no jugular venous cabrera A waves.   Pulmonary: no respiratory distress, normal respiratory rhythm and effort, no accessory muscle use and lungs were clear to auscultation bilaterally.   Cardiovascular: heart rate and rhythm were normal, normal S1 and S2 and no murmur, gallop, rub, heave or thrill are present.   Abdomen: soft, non-tender, no hepato-splenomegaly and no abdominal mass palpated.   Musculoskeletal: the gait could not be assessed..   Extremities: no clubbing of the fingernails, no localized cyanosis, no petechial hemorrhages and no ischemic changes.   Skin: normal skin color and pigmentation, no rash, no venous stasis, no skin lesions, no skin ulcer and no xanthoma was observed.   Psychiatric: oriented to person, place, and time, the affect was normal, the mood was normal and not feeling anxious.     LABS:   --------  03-24    137  |  103  |  24<H>  ----------------------------<  108<H>  4.5   |  26  |  1.00    Ca    8.8      24 Mar 2019 10:30    TPro  8.4<H>  /  Alb  2.2<L>  /  TBili  0.5  /  DBili  x   /  AST  56<H>  /  ALT  33  /  AlkPhos  167<H>  03-24                         8.7    12.74 )-----------( 519      ( 24 Mar 2019 10:30 )             27.8     PT/INR - ( 24 Mar 2019 10:30 )   PT: 16.2 sec;   INR: 1.42 ratio         PTT - ( 24 Mar 2019 10:30 )  PTT:42.8 sec            RADIOLOGY:  -----------------        ECG:     ECHO CARDIOLOGY CONSULT NOTE    Patient is a 63y Male with a known history of :    HPI:      REVIEW OF SYSTEMS:    CONSTITUTIONAL: No fever, weight loss, or fatigue  EYES: No eye pain, visual disturbances, or discharge  ENMT:  No difficulty hearing, tinnitus, vertigo; No sinus or throat pain  NECK: No pain or stiffness  BREASTS: No pain, masses, or nipple discharge  RESPIRATORY: No cough, wheezing, chills or hemoptysis; No shortness of breath  CARDIOVASCULAR: No chest pain, palpitations, dizziness, or leg swelling  GASTROINTESTINAL: No abdominal or epigastric pain. No nausea, vomiting, or hematemesis; No diarrhea or constipation. No melena or hematochezia.  GENITOURINARY: No dysuria, frequency, hematuria, or incontinence  NEUROLOGICAL: No headaches, memory loss, loss of strength, numbness, or tremors  SKIN: No itching, burning, rashes, or lesions   LYMPH NODES: No enlarged glands  ENDOCRINE: No heat or cold intolerance; No hair loss  MUSCULOSKELETAL: No joint pain or swelling; No muscle, back, or extremity pain  PSYCHIATRIC: No depression, anxiety, mood swings, or difficulty sleeping  HEME/LYMPH: No easy bruising, or bleeding gums  ALLERGY AND IMMUNOLOGIC: No hives or eczema    MEDICATIONS  (STANDING):  amLODIPine   Tablet 5 milliGRAM(s) Oral daily  atorvastatin 10 milliGRAM(s) Oral at bedtime  lisinopril 20 milliGRAM(s) Oral daily  vancomycin  IVPB 1000 milliGRAM(s) IV Intermittent once    MEDICATIONS  (PRN):  traMADol 50 milliGRAM(s) Oral every 6 hours PRN Moderate Pain (4 - 6)      ALLERGIES: No Known Allergies      Social History:     FAMILY HISTORY:      PAST MEDICAL & SURGICAL HISTORY:  PVD (peripheral vascular disease)  DVT (deep venous thrombosis)  Hypertension  Hyperlipidemia        PHYSICAL EXAMINATION:  -----------------------------  T(C): 37 (03-24-19 @ 09:51), Max: 37 (03-24-19 @ 09:51)  HR: 98 (03-24-19 @ 09:51) (98 - 98)  BP: 112/70 (03-24-19 @ 09:51) (112/70 - 112/70)  RR: 16 (03-24-19 @ 09:51) (16 - 16)  SpO2: 97% (03-24-19 @ 09:51) (97% - 97%)  Wt(kg): --      Weight (kg): 83.9 (03-24 @ 09:51)    Constitutional: well developed, normal appearance, well groomed, well nourished, no deformities and no acute distress.   Eyes: the conjunctiva exhibited no abnormalities and the eyelids demonstrated no xanthelasmas.   HEENT: normal oral mucosa, no oral pallor and no oral cyanosis.   Neck: normal jugular venous A waves present, normal jugular venous V waves present and no jugular venous cabrera A waves.   Pulmonary: no respiratory distress, normal respiratory rhythm and effort, no accessory muscle use and lungs were clear to auscultation bilaterally.   Cardiovascular: heart rate and rhythm were normal, normal S1 and S2 and no murmur, gallop, rub, heave or thrill are present.   Abdomen: soft, non-tender, no hepato-splenomegaly and no abdominal mass palpated.   Musculoskeletal: the gait could not be assessed..   Extremities: no clubbing of the fingernails, no localized cyanosis, no petechial hemorrhages and no ischemic changes.   Skin: normal skin color and pigmentation, no rash, no venous stasis, no skin lesions, no skin ulcer and no xanthoma was observed.   Psychiatric: oriented to person, place, and time, the affect was normal, the mood was normal and not feeling anxious.     LABS:   --------  03-24    137  |  103  |  24<H>  ----------------------------<  108<H>  4.5   |  26  |  1.00    Ca    8.8      24 Mar 2019 10:30    TPro  8.4<H>  /  Alb  2.2<L>  /  TBili  0.5  /  DBili  x   /  AST  56<H>  /  ALT  33  /  AlkPhos  167<H>  03-24                         8.7    12.74 )-----------( 519      ( 24 Mar 2019 10:30 )             27.8     PT/INR - ( 24 Mar 2019 10:30 )   PT: 16.2 sec;   INR: 1.42 ratio         PTT - ( 24 Mar 2019 10:30 )  PTT:42.8 sec            RADIOLOGY:  -----------------        ECG:     ECHO

## 2019-03-25 ENCOUNTER — RESULT REVIEW (OUTPATIENT)
Age: 64
End: 2019-03-25

## 2019-03-25 DIAGNOSIS — D63.8 ANEMIA IN OTHER CHRONIC DISEASES CLASSIFIED ELSEWHERE: ICD-10-CM

## 2019-03-25 DIAGNOSIS — E78.5 HYPERLIPIDEMIA, UNSPECIFIED: ICD-10-CM

## 2019-03-25 DIAGNOSIS — I25.10 ATHEROSCLEROTIC HEART DISEASE OF NATIVE CORONARY ARTERY WITHOUT ANGINA PECTORIS: ICD-10-CM

## 2019-03-25 DIAGNOSIS — I82.409 ACUTE EMBOLISM AND THROMBOSIS OF UNSPECIFIED DEEP VEINS OF UNSPECIFIED LOWER EXTREMITY: ICD-10-CM

## 2019-03-25 LAB
ANION GAP SERPL CALC-SCNC: 7 MMOL/L — SIGNIFICANT CHANGE UP (ref 5–17)
BUN SERPL-MCNC: 17 MG/DL — SIGNIFICANT CHANGE UP (ref 7–23)
CALCIUM SERPL-MCNC: 8.6 MG/DL — SIGNIFICANT CHANGE UP (ref 8.5–10.1)
CHLORIDE SERPL-SCNC: 103 MMOL/L — SIGNIFICANT CHANGE UP (ref 96–108)
CO2 SERPL-SCNC: 27 MMOL/L — SIGNIFICANT CHANGE UP (ref 22–31)
CREAT SERPL-MCNC: 0.94 MG/DL — SIGNIFICANT CHANGE UP (ref 0.5–1.3)
GLUCOSE SERPL-MCNC: 87 MG/DL — SIGNIFICANT CHANGE UP (ref 70–99)
HCT VFR BLD CALC: 24.5 % — LOW (ref 39–50)
HGB BLD-MCNC: 7.6 G/DL — LOW (ref 13–17)
INR BLD: 1.38 RATIO — HIGH (ref 0.88–1.16)
MCHC RBC-ENTMCNC: 27.5 PG — SIGNIFICANT CHANGE UP (ref 27–34)
MCHC RBC-ENTMCNC: 31 GM/DL — LOW (ref 32–36)
MCV RBC AUTO: 88.8 FL — SIGNIFICANT CHANGE UP (ref 80–100)
NRBC # BLD: 0 /100 WBCS — SIGNIFICANT CHANGE UP (ref 0–0)
PLATELET # BLD AUTO: 464 K/UL — HIGH (ref 150–400)
POTASSIUM SERPL-MCNC: 4.1 MMOL/L — SIGNIFICANT CHANGE UP (ref 3.5–5.3)
POTASSIUM SERPL-SCNC: 4.1 MMOL/L — SIGNIFICANT CHANGE UP (ref 3.5–5.3)
PROTHROM AB SERPL-ACNC: 15.9 SEC — HIGH (ref 10–12.9)
RBC # BLD: 2.76 M/UL — LOW (ref 4.2–5.8)
RBC # FLD: 13.6 % — SIGNIFICANT CHANGE UP (ref 10.3–14.5)
SODIUM SERPL-SCNC: 137 MMOL/L — SIGNIFICANT CHANGE UP (ref 135–145)
WBC # BLD: 8.33 K/UL — SIGNIFICANT CHANGE UP (ref 3.8–10.5)
WBC # FLD AUTO: 8.33 K/UL — SIGNIFICANT CHANGE UP (ref 3.8–10.5)

## 2019-03-25 PROCEDURE — 88311 DECALCIFY TISSUE: CPT | Mod: 26

## 2019-03-25 PROCEDURE — 88307 TISSUE EXAM BY PATHOLOGIST: CPT | Mod: 26

## 2019-03-25 RX ORDER — ONDANSETRON 8 MG/1
4 TABLET, FILM COATED ORAL ONCE
Qty: 0 | Refills: 0 | Status: DISCONTINUED | OUTPATIENT
Start: 2019-03-25 | End: 2019-03-25

## 2019-03-25 RX ORDER — HYDROMORPHONE HYDROCHLORIDE 2 MG/ML
1 INJECTION INTRAMUSCULAR; INTRAVENOUS; SUBCUTANEOUS EVERY 4 HOURS
Qty: 0 | Refills: 0 | Status: DISCONTINUED | OUTPATIENT
Start: 2019-03-25 | End: 2019-03-28

## 2019-03-25 RX ORDER — SODIUM CHLORIDE 9 MG/ML
1000 INJECTION INTRAMUSCULAR; INTRAVENOUS; SUBCUTANEOUS
Qty: 0 | Refills: 0 | Status: DISCONTINUED | OUTPATIENT
Start: 2019-03-25 | End: 2019-03-25

## 2019-03-25 RX ORDER — OXYCODONE HYDROCHLORIDE 5 MG/1
5 TABLET ORAL ONCE
Qty: 0 | Refills: 0 | Status: DISCONTINUED | OUTPATIENT
Start: 2019-03-25 | End: 2019-03-25

## 2019-03-25 RX ORDER — ATORVASTATIN CALCIUM 80 MG/1
10 TABLET, FILM COATED ORAL AT BEDTIME
Qty: 0 | Refills: 0 | Status: DISCONTINUED | OUTPATIENT
Start: 2019-03-25 | End: 2019-03-28

## 2019-03-25 RX ORDER — SODIUM CHLORIDE 9 MG/ML
1000 INJECTION, SOLUTION INTRAVENOUS
Qty: 0 | Refills: 0 | Status: DISCONTINUED | OUTPATIENT
Start: 2019-03-25 | End: 2019-03-28

## 2019-03-25 RX ORDER — HYDROMORPHONE HYDROCHLORIDE 2 MG/ML
0.5 INJECTION INTRAMUSCULAR; INTRAVENOUS; SUBCUTANEOUS
Qty: 0 | Refills: 0 | Status: DISCONTINUED | OUTPATIENT
Start: 2019-03-25 | End: 2019-03-28

## 2019-03-25 RX ORDER — FAMOTIDINE 10 MG/ML
20 INJECTION INTRAVENOUS DAILY
Qty: 0 | Refills: 0 | Status: DISCONTINUED | OUTPATIENT
Start: 2019-03-25 | End: 2019-03-28

## 2019-03-25 RX ORDER — LACTOBACILLUS ACIDOPHILUS 100MM CELL
1 CAPSULE ORAL EVERY 8 HOURS
Qty: 0 | Refills: 0 | Status: DISCONTINUED | OUTPATIENT
Start: 2019-03-25 | End: 2019-03-28

## 2019-03-25 RX ORDER — HYDROMORPHONE HYDROCHLORIDE 2 MG/ML
0.5 INJECTION INTRAMUSCULAR; INTRAVENOUS; SUBCUTANEOUS
Qty: 0 | Refills: 0 | Status: DISCONTINUED | OUTPATIENT
Start: 2019-03-25 | End: 2019-03-25

## 2019-03-25 RX ORDER — LISINOPRIL 2.5 MG/1
20 TABLET ORAL DAILY
Qty: 0 | Refills: 0 | Status: DISCONTINUED | OUTPATIENT
Start: 2019-03-25 | End: 2019-03-28

## 2019-03-25 RX ORDER — AMLODIPINE BESYLATE 2.5 MG/1
5 TABLET ORAL DAILY
Qty: 0 | Refills: 0 | Status: DISCONTINUED | OUTPATIENT
Start: 2019-03-25 | End: 2019-03-28

## 2019-03-25 RX ORDER — PIPERACILLIN AND TAZOBACTAM 4; .5 G/20ML; G/20ML
3.38 INJECTION, POWDER, LYOPHILIZED, FOR SOLUTION INTRAVENOUS EVERY 8 HOURS
Qty: 0 | Refills: 0 | Status: COMPLETED | OUTPATIENT
Start: 2019-03-25 | End: 2019-03-27

## 2019-03-25 RX ORDER — ACETAMINOPHEN 500 MG
650 TABLET ORAL EVERY 6 HOURS
Qty: 0 | Refills: 0 | Status: DISCONTINUED | OUTPATIENT
Start: 2019-03-25 | End: 2019-03-28

## 2019-03-25 RX ADMIN — HYDROMORPHONE HYDROCHLORIDE 1 MILLIGRAM(S): 2 INJECTION INTRAMUSCULAR; INTRAVENOUS; SUBCUTANEOUS at 21:30

## 2019-03-25 RX ADMIN — Medication 300 MILLIGRAM(S): at 10:25

## 2019-03-25 RX ADMIN — PIPERACILLIN AND TAZOBACTAM 25 GRAM(S): 4; .5 INJECTION, POWDER, LYOPHILIZED, FOR SOLUTION INTRAVENOUS at 21:44

## 2019-03-25 RX ADMIN — PIPERACILLIN AND TAZOBACTAM 25 GRAM(S): 4; .5 INJECTION, POWDER, LYOPHILIZED, FOR SOLUTION INTRAVENOUS at 02:08

## 2019-03-25 RX ADMIN — OXYCODONE AND ACETAMINOPHEN 2 TABLET(S): 5; 325 TABLET ORAL at 02:32

## 2019-03-25 RX ADMIN — SODIUM CHLORIDE 50 MILLILITER(S): 9 INJECTION, SOLUTION INTRAVENOUS at 21:39

## 2019-03-25 RX ADMIN — OXYCODONE AND ACETAMINOPHEN 2 TABLET(S): 5; 325 TABLET ORAL at 01:32

## 2019-03-25 RX ADMIN — PIPERACILLIN AND TAZOBACTAM 25 GRAM(S): 4; .5 INJECTION, POWDER, LYOPHILIZED, FOR SOLUTION INTRAVENOUS at 10:26

## 2019-03-25 NOTE — BRIEF OPERATIVE NOTE - NSICDXBRIEFPREOP_GEN_ALL_CORE_FT
PRE-OP DIAGNOSIS:  Contracture of left knee 25-Mar-2019 19:58:14  Yady Courtney  Gangrene of left foot 25-Mar-2019 19:57:37  Yady Courtney

## 2019-03-25 NOTE — PROGRESS NOTE ADULT - SUBJECTIVE AND OBJECTIVE BOX
ARELY REARDON [facility] P    ALLERGY nka   CONTACT Son Percy R  self          Initial evaluation/Pulmonary Critical Care consultation requested on   3/24/2019 by Dr Blanton from Dr Sotelo   Patient examined chart reviewed    HOSPITAL ADMISSION [facility] 3/24/2019    PATIENT CAME  FROM (if information available)        TYPE OF VISIT      Subsequent Pulm FU       REASON FOR VISIT/PROBLEM LIST     GANGRENE FOOT   CLEARANCE FOR AMPUTATION  PATIENT ON VANCO (3/24)    HO DVT 2 M PTA 3/25 V duplex No DVT rle, lle nondiagnostic   ANEMIA norm 3/24-3/25/2019 Hb 8.7-7.6  Hyperlipidemia    Hypertension    PVD (peripheral vascular disease).  S/P CABG (coronary artery bypass graft).    BRIEF PATIENT  DESCRIPTION  HOSPITAL COURSE PATIENT ARELY REARDON 3/24/2019 ADMISSION [facility]  DR SAMPSON BLANTON      63 m with PMH DVT 2 m ago hyperlipidemia hypertension CABG PVD was admitted [facility] 3/24/2019 because of progressive foul smelling back discolored  l foot scheduled for amputation 3/25 Patient is on xarelto for dvt and Pulm consulted for periop management     home meds amlodipine 5 lisinopril 20 plavix 75 atorvastat 10 xarelto 20 tramadol 50 augmentin     Xarelto was held Patient was cleared for Pulm standpoint     VITALS/LABS PATIENT CARON MCGEE        3/25/2019 afeb 98 100/75 18 92%    3/25/2019 W 8.3 Hb 7.6 Plt 464   3/24/2019 W 12.7 Hb 8.7 Plt 519  Na 137 K 4.5 CO3 26 Cr 1   3/24/2019 CXR no infiltr    REVIEW OF SYMPTOMS    Able to give ROS  Yes     RELIABLE No   CONSTITUTIONAL Weakness Yes  Chills No Vision changes No  ENDOCRINE No unexplained hair loss No heat or cold intolerance    ALLERGY No hives  Sore throat No   RESP Coughing blood no  Shortness of breath YES   NEURO No Headache  Confusion Pain neck No   CARDIAC No Chest pain No Palpitations   GI No Pain abdomen NO   Vomiting NO     PHYSICAL EXAM    HEENT Unremarkable PERRLA atraumatic   RESP Fair air entry EXP prolonged    Harsh breath sound Resp distres mild   CARDIAC S1 S2 No S3     NO JVD    ABDOMEN SOFT BS PRESENT NOT DISTENDED No hepatosplenomegaly PEDAL EDEMA present No calf tenderness  NO rash   GENERAL Not TOXIC looking

## 2019-03-25 NOTE — CHART NOTE - NSCHARTNOTEFT_GEN_A_CORE
Upon Nutritional Assessment by the Registered Dietitian your patient was determined to meet criteria / has evidence of the following diagnosis/diagnoses:          [ ]  Mild Protein Calorie Malnutrition        [x ]  Moderate Protein Calorie Malnutrition, chronic        [ ] Severe Protein Calorie Malnutrition        [ ] Unspecified Protein Calorie Malnutrition        [ ] Underweight / BMI <19        [ ] Morbid Obesity / BMI > 40      Findings as based on:  •  Comprehensive nutrition assessment and consultation  •  Calorie counts (nutrient intake analysis)  •  Food acceptance and intake status from observations by staff  •  Follow up  •  Patient education  •  Intervention secondary to interdisciplinary rounds  •   concerns    ** severe temporal wasting, moderate clavicle, scapula wasting, moderate fat loss triceps  Treatment:    The following diet has been recommended:    After OR, Ensure Enlive TID to meet increased energy/protein needs  PROVIDER Section:     By signing this assessment you are acknowledging and agree with the diagnosis/diagnoses assigned by the Registered Dietitian    Comments:

## 2019-03-25 NOTE — PROGRESS NOTE ADULT - PROBLEM SELECTOR PLAN 2
continue current management and present  medications SERIAL H/H ,transfusion of PRBCS ordered prior to OR

## 2019-03-25 NOTE — PROGRESS NOTE ADULT - SUBJECTIVE AND OBJECTIVE BOX
SURGERY            63y    SUBJECTIVE:   Patient seen at bedside, no overnight events, no complaints noted and pain is controlled at this time.   Patient scheduled for Left AKA , add on for later today, patient NPO  Patient denies any headaches, chest pain, shortness of breath, nausea, vomiting, fever, chills, weakness, dysuria    OBJECTIVE:   T(C): 36.6 (03-25-19 @ 05:14), Max: 36.9 (03-24-19 @ 20:07)  HR: 98 (03-25-19 @ 05:14) (89 - 100)  BP: 100/75 (03-25-19 @ 05:14) (96/59 - 115/74)  RR: 18 (03-25-19 @ 05:14) (16 - 18)  SpO2: 92% (03-25-19 @ 05:14) (92% - 99%)  Wt(kg): --  CAPILLARY BLOOD GLUCOSE        I&O's Detail    25 Mar 2019 07:01  -  25 Mar 2019 09:53  --------------------------------------------------------  IN:    dextrose 5% + sodium chloride 0.45%.: 150 mL  Total IN: 150 mL    OUT:  Total OUT: 0 mL    Total NET: 150 mL          Physical exam:  General: A+O x 3 in NAD  HEENT: PERRLA, EOM intact  Extremities: Left : + gangrene left foot,  dressing intact, foul smelling    MEDICATIONS  (STANDING):  amLODIPine   Tablet 5 milliGRAM(s) Oral daily  atorvastatin 10 milliGRAM(s) Oral at bedtime  dextrose 5% + sodium chloride 0.45%. 1000 milliLiter(s) (50 mL/Hr) IV Continuous <Continuous>  famotidine    Tablet 20 milliGRAM(s) Oral daily  lactobacillus acidophilus 1 Tablet(s) Oral every 8 hours  lisinopril 20 milliGRAM(s) Oral daily  piperacillin/tazobactam IVPB. 3.375 Gram(s) IV Intermittent every 8 hours  vancomycin  IVPB 1500 milliGRAM(s) IV Intermittent every 12 hours    MEDICATIONS  (PRN):  acetaminophen   Tablet .. 650 milliGRAM(s) Oral every 6 hours PRN Temp greater or equal to 38C (100.4F), Mild Pain (1 - 3)  oxyCODONE    5 mG/acetaminophen 325 mG 2 Tablet(s) Oral every 4 hours PRN Severe Pain (7 - 10)  traMADol 50 milliGRAM(s) Oral every 6 hours PRN Moderate Pain (4 - 6)      LABS:                        7.6    8.33  )-----------( 464      ( 25 Mar 2019 08:04 )             24.5     03-25    137  |  103  |  17  ----------------------------<  87  4.1   |  27  |  0.94    Ca    8.6      25 Mar 2019 08:04    TPro  8.4<H>  /  Alb  2.2<L>  /  TBili  0.5  /  DBili  x   /  AST  56<H>  /  ALT  33  /  AlkPhos  167<H>  03-24    PT/INR - ( 25 Mar 2019 08:04 )   PT: 15.9 sec;   INR: 1.38 ratio         PTT - ( 24 Mar 2019 10:30 )  PTT:42.8 sec      RADIOLOGY & ADDITIONAL STUDIES:    Assessment  Patient is a 63y old  Male who presents with LLE gangrene, scheduled for L AKA later today      Plan  - IVF  - NPO  - IV antibiotics  - OR today for Left AKA      Surgical Team Contact Information  Spectralink: Ext: [phone] or [phone]  Pager: [phone]

## 2019-03-25 NOTE — PRE-OP CHECKLIST - DNR CLARIFICATION FORM COMPLETED
12/14/2017        Jaziel John   [place] [address]        Re:  Jaziel John   1987      To Whom it May Concern:    Re: Excuse Slip      This is to certify that the above listed patient was under our care for professional attention. The patient was not seen in the office.    Please excuse her from school due to illness on 12/12/17 and 12/13/17.    Comments:     If you have any questions, please do not hesitate to call my office at Dept: [phone]    Sincerely,            Ross Torres MD  [facility] [place] [address] [phone]  
n/a

## 2019-03-25 NOTE — DIETITIAN INITIAL EVALUATION ADULT. - DIET TYPE
dysphagia 2, mechanical , soft, honey consistency fld/NPO for procedure/test/DASH/TLC (sodium and cholesterol restricted diet)

## 2019-03-25 NOTE — PROGRESS NOTE ADULT - PROBLEM SELECTOR PLAN 3
continue home medications ,card clearance requested Resume xarelto after surgery when ok with Dr Stanley ,currently on hold

## 2019-03-25 NOTE — DIETITIAN INITIAL EVALUATION ADULT. - ENERGY NEEDS
Pt refused most of nutrition focused physical exam, but severe temporal wasting noted,moderate muscle loss clavicle/scapula, moderate fat loss triceps.. Was on chopped diet with thin liquids PTA, honey thick while here. SLP eval not avail in EMR, but with reported hx of difficulty swallowing.NPO today for AKA, hx gangrene. Rec add mvi after OR.

## 2019-03-25 NOTE — GOALS OF CARE CONVERSATION - PERSONAL ADVANCE DIRECTIVE - CONVERSATION DETAILS
Called patients son Percy Knight to discuss advances directives, he claims to be the health care proxy. He will be available on wednesday at 10 am to discuss Mol[employer].

## 2019-03-25 NOTE — PROGRESS NOTE ADULT - SUBJECTIVE AND OBJECTIVE BOX
PROGRESS NOTE  Patient is a 63y old  Male who presents with a chief complaint of LLE GANGRENE (25 Mar 2019 09:52)  Chart and available morning labs /imaging are reviewed electronically , urgent issues addressed . More information  is being added upon completion of rounds , when more information is collected and management discussed with consultants , medical staff and social service/case management on the floor     OVERNIGHT  No new issues reported by medical staff . All above noted Patient is resting in a bed comfortably .Confused ,poor mentation .No distress noted   Scheduled to OR today   HPI:  64 yo male with H/O DVT (deep venous thrombosis ,nearly occlusive popliteal )on Xarelto   ,Hyperlipidemia  ,Hypertension and PVD (peripheral vascular disease) ,PAD ,LLE popliteal bypass ,s/p left 5th toe amputation ,CVA in 1987 with L hemiparesis and contractures ,hx of CAD , Quadriple bypass , left foot toe avulsion .  Patient  was sent  today from [facility]  SNF for evaluation of worsening necrotic left foot now foul smelling with gangrenous changes ,followed b y wound care team in facility and vascular sx evaluation was recommended . Seen by Dr Stanley in the office 2 days ago and scheduled for elective amputation tomorrow . XARELTO and PLAVIX are on hold since 03/22 No fever, chills, SOB and or pain in foot Patient was recently hospitalized in [facility] with LLE bleeding ( after he pulled the skin from left foot ) Admitted for septic workup and evaluation,send blood and urine cx,serial lactate levels,monitor vitals closley,ivfs hydration,monitor urine output and renal profile,iv abx initiated Palliative care consult requested ,to discuss advance directives and complete MOLST (24 Mar 2019 11:48)    PAST MEDICAL & SURGICAL HISTORY:  PVD (peripheral vascular disease)  DVT (deep venous thrombosis)  Hypertension  Hyperlipidemia  S/P CABG (coronary artery bypass graft)      MEDICATIONS  (STANDING):  amLODIPine   Tablet 5 milliGRAM(s) Oral daily  atorvastatin 10 milliGRAM(s) Oral at bedtime  dextrose 5% + sodium chloride 0.45%. 1000 milliLiter(s) (50 mL/Hr) IV Continuous <Continuous>  famotidine    Tablet 20 milliGRAM(s) Oral daily  lactobacillus acidophilus 1 Tablet(s) Oral every 8 hours  lisinopril 20 milliGRAM(s) Oral daily  piperacillin/tazobactam IVPB. 3.375 Gram(s) IV Intermittent every 8 hours  vancomycin  IVPB 1500 milliGRAM(s) IV Intermittent every 12 hours    MEDICATIONS  (PRN):  acetaminophen   Tablet .. 650 milliGRAM(s) Oral every 6 hours PRN Temp greater or equal to 38C (100.4F), Mild Pain (1 - 3)  oxyCODONE    5 mG/acetaminophen 325 mG 2 Tablet(s) Oral every 4 hours PRN Severe Pain (7 - 10)  traMADol 50 milliGRAM(s) Oral every 6 hours PRN Moderate Pain (4 - 6)      OBJECTIVE    T(C): 36.6 (03-25-19 @ 05:14), Max: 36.9 (03-24-19 @ 20:07)  HR: 98 (03-25-19 @ 05:14) (89 - 100)  BP: 100/75 (03-25-19 @ 05:14) (96/59 - 115/74)  RR: 18 (03-25-19 @ 05:14) (16 - 18)  SpO2: 92% (03-25-19 @ 05:14) (92% - 99%)  Wt(kg): --  I&O's Summary    25 Mar 2019 07:01  -  25 Mar 2019 12:25  --------------------------------------------------------  IN: 150 mL / OUT: 0 mL / NET: 150 mL          REVIEW OF SYSTEMS:  ROS is unobtainable due to dementia and confusion     PHYSICAL EXAM:  Appearance: NAD. VS past 24 hrs -as above   HEENT:   Moist oral mucosa. Conjunctiva clear b/l.   Neck : supple  Respiratory: Lungs CTAB.  Gastrointestinal:  Soft, nontender. No rebound. No rigidity. BS present	  Cardiovascular: RRR ,S1S2 present  Neurologic: Non-focal. Moving all extremities.  Extremities: left foot gangrene  Skin: No rashes, No ecchymoses, No cyanosis.	  wounds ,skin lesions-See skin assesment flow sheet   LABS:                        7.6    8.33  )-----------( 464      ( 25 Mar 2019 08:04 )             24.5     03-25    137  |  103  |  17  ----------------------------<  87  4.1   |  27  |  0.94    Ca    8.6      25 Mar 2019 08:04    TPro  8.4<H>  /  Alb  2.2<L>  /  TBili  0.5  /  DBili  x   /  AST  56<H>  /  ALT  33  /  AlkPhos  167<H>  03-24    CAPILLARY BLOOD GLUCOSE        PT/INR - ( 25 Mar 2019 08:04 )   PT: 15.9 sec;   INR: 1.38 ratio         PTT - ( 24 Mar 2019 10:30 )  PTT:42.8 sec      RADIOLOGY & ADDITIONAL TESTS:< from: US Duplex Venous Lower Ext Complete, Bilateral (03.24.19 @ 16:46) >  EXAM:  US DPLX LWR EXT VEINS COMPL BI                            PROCEDURE DATE:  03/24/2019          INTERPRETATION:  CLINICAL INFORMATION: Leg pain.    COMPARISON: None available.    TECHNIQUE: Duplex sonography of the right LOWER extremities with color   and spectral Doppler, with and without compression.      FINDINGS:    Patient is unable to position the left leg for adequate diagnostic exam.       There is normal compressibility of the right common femoral, femoral and   popliteal veins. No calf vein thrombosis is detected.    Doppler examination shows normal spontaneous and phasic flow.    IMPRESSION:     No evidence of right lower extremity deep venous thrombosis.    Nondiagnostic examination of the left leg.    < end of copied text >     reviewed elctronically  ASSESSMENT/PLAN: PROGRESS NOTE  Patient is a 63y old  Male who presents with a chief complaint of LLE GANGRENE (25 Mar 2019 09:52)  Chart and available morning labs /imaging are reviewed electronically , urgent issues addressed . More information  is being added upon completion of rounds , when more information is collected and management discussed with consultants , medical staff and social service/case management on the floor     OVERNIGHT  No new issues reported by medical staff . All above noted Patient is resting in a bed comfortably .Confused ,poor mentation .No distress noted   Scheduled to OR today   HPI:  64 yo male with H/O DVT (deep venous thrombosis ,nearly occlusive popliteal )on Xarelto   ,Hyperlipidemia  ,Hypertension and PVD (peripheral vascular disease) ,PAD ,LLE popliteal bypass ,s/p left 5th toe amputation ,CVA in 1987 with L hemiparesis and contractures ,hx of CAD , Quadriple bypass , left foot toe avulsion .  Patient  was sent  today from [facility]  SNF for evaluation of worsening necrotic left foot now foul smelling with gangrenous changes ,followed b y wound care team in facility and vascular sx evaluation was recommended . Seen by Dr Stanley in the office 2 days ago and scheduled for elective amputation tomorrow . XARELTO and PLAVIX are on hold since 03/22 No fever, chills, SOB and or pain in foot Patient was recently hospitalized in [facility] with LLE bleeding ( after he pulled the skin from left foot ) Admitted for septic workup and evaluation,send blood and urine cx,serial lactate levels,monitor vitals closley,ivfs hydration,monitor urine output and renal profile,iv abx initiated Palliative care consult requested ,to discuss advance directives and complete MOLST (24 Mar 2019 11:48)    PAST MEDICAL & SURGICAL HISTORY:  PVD (peripheral vascular disease)  DVT (deep venous thrombosis)  Hypertension  Hyperlipidemia  S/P CABG (coronary artery bypass graft)      MEDICATIONS  (STANDING):  amLODIPine   Tablet 5 milliGRAM(s) Oral daily  atorvastatin 10 milliGRAM(s) Oral at bedtime  dextrose 5% + sodium chloride 0.45%. 1000 milliLiter(s) (50 mL/Hr) IV Continuous <Continuous>  famotidine    Tablet 20 milliGRAM(s) Oral daily  lactobacillus acidophilus 1 Tablet(s) Oral every 8 hours  lisinopril 20 milliGRAM(s) Oral daily  piperacillin/tazobactam IVPB. 3.375 Gram(s) IV Intermittent every 8 hours  vancomycin  IVPB 1500 milliGRAM(s) IV Intermittent every 12 hours    MEDICATIONS  (PRN):  acetaminophen   Tablet .. 650 milliGRAM(s) Oral every 6 hours PRN Temp greater or equal to 38C (100.4F), Mild Pain (1 - 3)  oxyCODONE    5 mG/acetaminophen 325 mG 2 Tablet(s) Oral every 4 hours PRN Severe Pain (7 - 10)  traMADol 50 milliGRAM(s) Oral every 6 hours PRN Moderate Pain (4 - 6)      OBJECTIVE    T(C): 36.6 (03-25-19 @ 05:14), Max: 36.9 (03-24-19 @ 20:07)  HR: 98 (03-25-19 @ 05:14) (89 - 100)  BP: 100/75 (03-25-19 @ 05:14) (96/59 - 115/74)  RR: 18 (03-25-19 @ 05:14) (16 - 18)  SpO2: 92% (03-25-19 @ 05:14) (92% - 99%)  Wt(kg): --  I&O's Summary    25 Mar 2019 07:01  -  25 Mar 2019 12:25  --------------------------------------------------------  IN: 150 mL / OUT: 0 mL / NET: 150 mL          REVIEW OF SYSTEMS:  ROS is unobtainable due to dementia and confusion   Physical Exam:  · Constitutional	detailed exam	  · Eyes	detailed exam	  · Eyes Details	PERRL; EOMI; conjunctiva clear	  · ENMT	No oral lesions; no gross abnormalities	  · Neck	detailed exam	  · Neck Details	normal; supple; no JVD	  · Breasts	No masses; no nipple discharge	  · Back	detailed exam	  · Back Details	cannot attain upright position	  · Respiratory	detailed exam	  · Respiratory Details	normal; airway patent; breath sounds equal; diminished breath sounds, L; diminished breath sounds, R	  · Cardiovascular	detailed exam	  · Cardiovascular Details	regular rate and rhythm	  · Cardiovascular Details	positive S1; positive S2	  · Gastrointestinal	detailed exam	  · GI Normal	normal; soft; nontender; no distention; no masses palpable; bowel sounds normal	  · Genitourinary	patient refused	  · Rectal	patient refused	  · Extremities	detailed exam	  · Extremities Details	normal	  · Extremities Comments	LLE SKIN AVULSION OF FOOT AND ANKLE WITH GANGRENE OF LEFT FOOT	  · Vascular	detailed exam	  · Vascular Details	SEEN BY VASCULAR SX	  · Neurological	detailed exam	  · Neurological Details	AAOX 2	  · Mental Status	PERIODS OF CONFUSION	  · Motor	LEFT SIDED WEAKNESS	  · Skin	detailed exam	  · Skin Comments	SKIN AVULSION OF LEFT FOOT AND ANKLE	  · Lymph Nodes	detailed exam	  · Lymphatic Details	inguinal L	  · Musculoskeletal	detailed exam	  · Musculoskeletal Details	LLE CALF TENDERNESS	  · Musculoskeletal Comments	CONTRACTURES OF LEFT ARM AND HAND	  · Psychiatric	detailed exam	  · Psychiatric Details	depressed	  · Psychiatric Comments	DEMENTIA	      LABS:                        7.6    8.33  )-----------( 464      ( 25 Mar 2019 08:04 )             24.5     03-25    137  |  103  |  17  ----------------------------<  87  4.1   |  27  |  0.94    Ca    8.6      25 Mar 2019 08:04    TPro  8.4<H>  /  Alb  2.2<L>  /  TBili  0.5  /  DBili  x   /  AST  56<H>  /  ALT  33  /  AlkPhos  167<H>  03-24    CAPILLARY BLOOD GLUCOSE        PT/INR - ( 25 Mar 2019 08:04 )   PT: 15.9 sec;   INR: 1.38 ratio         PTT - ( 24 Mar 2019 10:30 )  PTT:42.8 sec      RADIOLOGY & ADDITIONAL TESTS:< from: US Duplex Venous Lower Ext Complete, Bilateral (03.24.19 @ 16:46) >  EXAM:  US DPLX LWR EXT VEINS COMPL BI                            PROCEDURE DATE:  03/24/2019          INTERPRETATION:  CLINICAL INFORMATION: Leg pain.    COMPARISON: None available.    TECHNIQUE: Duplex sonography of the right LOWER extremities with color   and spectral Doppler, with and without compression.      FINDINGS:    Patient is unable to position the left leg for adequate diagnostic exam.       There is normal compressibility of the right common femoral, femoral and   popliteal veins. No calf vein thrombosis is detected.    Doppler examination shows normal spontaneous and phasic flow.    IMPRESSION:     No evidence of right lower extremity deep venous thrombosis.    Nondiagnostic examination of the left leg.    < end of copied text >     reviewed elctronically  ASSESSMENT/PLAN:

## 2019-03-25 NOTE — BRIEF OPERATIVE NOTE - NSICDXBRIEFPOSTOP_GEN_ALL_CORE_FT
POST-OP DIAGNOSIS:  Contracture of left knee 25-Mar-2019 19:59:00  Yady Courtney  Gangrene of left foot 25-Mar-2019 19:58:29  Yady Courtney

## 2019-03-25 NOTE — PROGRESS NOTE ADULT - PROBLEM SELECTOR PLAN 5
Palliative care consult requested ,to discuss advance directives and complete MOLST continue home medications ,card clearance requested

## 2019-03-25 NOTE — DIETITIAN INITIAL EVALUATION ADULT. - OTHER INFO
no hx wt loss, was 182# at [facility], old chart n/a. from [facility], on chopped, thin liquids per transfer papers

## 2019-03-25 NOTE — PROGRESS NOTE ADULT - ATTENDING COMMENTS
62 yo male with H/O DVT (deep venous thrombosis ,nearly occlusive popliteal )on Xarelto   ,Hyperlipidemia  ,Hypertension and PVD (peripheral vascular disease) ,PAD ,LLE popliteal bypass ,s/p left 5th toe amputation ,CVA in 1987 with L hemiparesis and contractures ,hx of CAD , Quadriple bypass , left foot toe avulsion .  Patient  was sent  today from [facility]  SNF for evaluation of worsening necrotic left foot now foul smelling with gangrenous changes ,followed b y wound care team in facility and vascular sx evaluation was recommended . Seen by Dr Stanley in the office 2 days ago and scheduled for elective amputation tomorrow . XARELTO and PLAVIX are on hold since 03/22 No fever, chills, SOB and or pain in foot Patient was recently hospitalized in [facility] with LLE bleeding ( after he pulle dthe skin from left foot  Admitted for septic workup and evaluation,send blood and urine cx,serial lactate levels,monitor vitals closley,ivfs hydration,monitor urine output and renal profile,iv abx initiated Palliative care consult requested ,to discuss advance directives and complete MOLST .MEDICALLY OPTIMIZED FOR OR AND CARDIOLOGY CLEARANCE IS REQUESTED MEDICALLY OPTIMIZED FOR OR AND CARDIOLOGY CLEARANCE IS OBTAINED

## 2019-03-25 NOTE — PROGRESS NOTE ADULT - SUBJECTIVE AND OBJECTIVE BOX
Patient is a 63y Male with a known history of :  Thrombophlebitis of popliteal vein ([id number])  Prophylactic measure (Z29.9)  Immobility (Z74.09)  Malnutrition (E46)  Hypertension (I10)  PVD (peripheral vascular disease) (I73.9)  Gangrene (I96)    HPI:  62 yo male with H/O DVT (deep venous thrombosis ,nearly occlusive popliteal )on Xarelto   ,Hyperlipidemia  ,Hypertension and PVD (peripheral vascular disease) ,PAD ,LLE popliteal bypass ,s/p left 5th toe amputation ,CVA in 1987 with L hemiparesis and contractures ,hx of CAD , Quadriple bypass , left foot toe avulsion .  Patient  was sent  today from [facility]  SNF for evaluation of worsening necrotic left foot now foul smelling with gangrenous changes ,followed b y wound care team in facility and vascular sx evaluation was recommended . Seen by Dr Stanley in the office 2 days ago and scheduled for elective amputation tomorrow . XARELTO and PLAVIX are on hold since 03/22 No fever, chills, SOB and or pain in foot Patient was recently hospitalized in [facility] [place] with LLE bleeding ( after he pulled the skin from left foot ) Admitted for septic workup and evaluation,send blood and urine cx,serial lactate levels,monitor vitals closley,ivfs hydration,monitor urine output and renal profile,iv abx initiated Palliative care consult requested ,to discuss advance directives and complete MOLST (24 Mar 2019 11:48)      REVIEW OF SYSTEMS:    CONSTITUTIONAL: No fever, weight loss, or fatigue  EYES: No eye pain, visual disturbances, or discharge  ENMT:  No difficulty hearing, tinnitus, vertigo; No sinus or throat pain  NECK: No pain or stiffness  BREASTS: No pain, masses, or nipple discharge  RESPIRATORY: No cough, wheezing, chills or hemoptysis; No shortness of breath  CARDIOVASCULAR: No chest pain, palpitations, dizziness, or leg swelling  GASTROINTESTINAL: No abdominal or epigastric pain. No nausea, vomiting, or hematemesis; No diarrhea or constipation. No melena or hematochezia.  GENITOURINARY: No dysuria, frequency, hematuria, or incontinence  NEUROLOGICAL: No headaches, memory loss, loss of strength, numbness, or tremors  SKIN: No itching, burning, rashes, or lesions   LYMPH NODES: No enlarged glands  ENDOCRINE: No heat or cold intolerance; No hair loss  MUSCULOSKELETAL: No joint pain or swelling; No muscle, back, or extremity pain  PSYCHIATRIC: No depression, anxiety, mood swings, or difficulty sleeping  HEME/LYMPH: No easy bruising, or bleeding gums  ALLERGY AND IMMUNOLOGIC: No hives or eczema    MEDICATIONS  (STANDING):  amLODIPine   Tablet 5 milliGRAM(s) Oral daily  atorvastatin 10 milliGRAM(s) Oral at bedtime  dextrose 5% + sodium chloride 0.45%. 1000 milliLiter(s) (50 mL/Hr) IV Continuous <Continuous>  famotidine    Tablet 20 milliGRAM(s) Oral daily  lactobacillus acidophilus 1 Tablet(s) Oral every 8 hours  lisinopril 20 milliGRAM(s) Oral daily  piperacillin/tazobactam IVPB. 3.375 Gram(s) IV Intermittent every 8 hours  vancomycin  IVPB 1500 milliGRAM(s) IV Intermittent every 12 hours    MEDICATIONS  (PRN):  acetaminophen   Tablet .. 650 milliGRAM(s) Oral every 6 hours PRN Temp greater or equal to 38C (100.4F), Mild Pain (1 - 3)  oxyCODONE    5 mG/acetaminophen 325 mG 2 Tablet(s) Oral every 4 hours PRN Severe Pain (7 - 10)  traMADol 50 milliGRAM(s) Oral every 6 hours PRN Moderate Pain (4 - 6)      ALLERGIES: No Known Allergies      FAMILY HISTORY:      PHYSICAL EXAMINATION:  -----------------------------  T(C): 36.6 (03-25-19 @ 05:14), Max: 37 (03-24-19 @ 09:51)  HR: 98 (03-25-19 @ 05:14) (89 - 100)  BP: 100/75 (03-25-19 @ 05:14) (96/59 - 115/74)  RR: 18 (03-25-19 @ 05:14) (16 - 18)  SpO2: 92% (03-25-19 @ 05:14) (92% - 99%)  Wt(kg): --      Weight (kg): 83.9 (03-24 @ 09:51)    Constitutional: well developed, normal appearance, well groomed, well nourished, no deformities and no acute distress.   Eyes: the conjunctiva exhibited no abnormalities and the eyelids demonstrated no xanthelasmas.   HEENT: normal oral mucosa, no oral pallor and no oral cyanosis.   Neck: normal jugular venous A waves present, normal jugular venous V waves present and no jugular venous cabrera A waves.   Pulmonary: no respiratory distress, normal respiratory rhythm and effort, no accessory muscle use and lungs were clear to auscultation bilaterally.   Cardiovascular: heart rate and rhythm were normal, normal S1 and S2 and no murmur, gallop, rub, heave or thrill are present.   Abdomen: soft, non-tender, no hepato-splenomegaly and no abdominal mass palpated.   Musculoskeletal: the gait could not be assessed..   Extremities: no clubbing of the fingernails, no localized cyanosis, no petechial hemorrhages and no ischemic changes.   Skin: normal skin color and pigmentation, no rash, no venous stasis, no skin lesions, no skin ulcer and no xanthoma was observed.   Psychiatric: oriented to person, place, and time, the affect was normal, the mood was normal and not feeling anxious.     LABS:   --------  03-25    137  |  103  |  17  ----------------------------<  87  4.1   |  27  |  0.94    Ca    8.6      25 Mar 2019 08:04    TPro  8.4<H>  /  Alb  2.2<L>  /  TBili  0.5  /  DBili  x   /  AST  56<H>  /  ALT  33  /  AlkPhos  167<H>  03-24                         7.6    8.33  )-----------( 464      ( 25 Mar 2019 08:04 )             24.5     PT/INR - ( 25 Mar 2019 08:04 )   PT: 15.9 sec;   INR: 1.38 ratio         PTT - ( 24 Mar 2019 10:30 )  PTT:42.8 sec            RADIOLOGY:  -----------------        ECG:

## 2019-03-26 LAB
ANION GAP SERPL CALC-SCNC: 7 MMOL/L — SIGNIFICANT CHANGE UP (ref 5–17)
APPEARANCE UR: CLEAR — SIGNIFICANT CHANGE UP
BASOPHILS # BLD AUTO: 0.05 K/UL — SIGNIFICANT CHANGE UP (ref 0–0.2)
BASOPHILS NFR BLD AUTO: 0.5 % — SIGNIFICANT CHANGE UP (ref 0–2)
BILIRUB UR-MCNC: NEGATIVE — SIGNIFICANT CHANGE UP
BUN SERPL-MCNC: 10 MG/DL — SIGNIFICANT CHANGE UP (ref 7–23)
CALCIUM SERPL-MCNC: 8.1 MG/DL — LOW (ref 8.5–10.1)
CHLORIDE SERPL-SCNC: 104 MMOL/L — SIGNIFICANT CHANGE UP (ref 96–108)
CO2 SERPL-SCNC: 26 MMOL/L — SIGNIFICANT CHANGE UP (ref 22–31)
COLOR SPEC: YELLOW — SIGNIFICANT CHANGE UP
CREAT SERPL-MCNC: 0.86 MG/DL — SIGNIFICANT CHANGE UP (ref 0.5–1.3)
DIFF PNL FLD: ABNORMAL
EOSINOPHIL # BLD AUTO: 0.06 K/UL — SIGNIFICANT CHANGE UP (ref 0–0.5)
EOSINOPHIL NFR BLD AUTO: 0.6 % — SIGNIFICANT CHANGE UP (ref 0–6)
GLUCOSE SERPL-MCNC: 109 MG/DL — HIGH (ref 70–99)
GLUCOSE UR QL: NEGATIVE — SIGNIFICANT CHANGE UP
HCT VFR BLD CALC: 28.4 % — LOW (ref 39–50)
HGB BLD-MCNC: 9.1 G/DL — LOW (ref 13–17)
IMM GRANULOCYTES NFR BLD AUTO: 0.6 % — SIGNIFICANT CHANGE UP (ref 0–1.5)
KETONES UR-MCNC: NEGATIVE — SIGNIFICANT CHANGE UP
LEUKOCYTE ESTERASE UR-ACNC: ABNORMAL
LYMPHOCYTES # BLD AUTO: 1.13 K/UL — SIGNIFICANT CHANGE UP (ref 1–3.3)
LYMPHOCYTES # BLD AUTO: 11.1 % — LOW (ref 13–44)
MCHC RBC-ENTMCNC: 28.2 PG — SIGNIFICANT CHANGE UP (ref 27–34)
MCHC RBC-ENTMCNC: 32 GM/DL — SIGNIFICANT CHANGE UP (ref 32–36)
MCV RBC AUTO: 87.9 FL — SIGNIFICANT CHANGE UP (ref 80–100)
MONOCYTES # BLD AUTO: 0.55 K/UL — SIGNIFICANT CHANGE UP (ref 0–0.9)
MONOCYTES NFR BLD AUTO: 5.4 % — SIGNIFICANT CHANGE UP (ref 2–14)
NEUTROPHILS # BLD AUTO: 8.33 K/UL — HIGH (ref 1.8–7.4)
NEUTROPHILS NFR BLD AUTO: 81.8 % — HIGH (ref 43–77)
NITRITE UR-MCNC: NEGATIVE — SIGNIFICANT CHANGE UP
NRBC # BLD: 0 /100 WBCS — SIGNIFICANT CHANGE UP (ref 0–0)
PH UR: 6.5 — SIGNIFICANT CHANGE UP (ref 5–8)
PLATELET # BLD AUTO: 466 K/UL — HIGH (ref 150–400)
POTASSIUM SERPL-MCNC: 4 MMOL/L — SIGNIFICANT CHANGE UP (ref 3.5–5.3)
POTASSIUM SERPL-SCNC: 4 MMOL/L — SIGNIFICANT CHANGE UP (ref 3.5–5.3)
PROT UR-MCNC: 25 MG/DL
RBC # BLD: 3.23 M/UL — LOW (ref 4.2–5.8)
RBC # FLD: 13.6 % — SIGNIFICANT CHANGE UP (ref 10.3–14.5)
SODIUM SERPL-SCNC: 137 MMOL/L — SIGNIFICANT CHANGE UP (ref 135–145)
SP GR SPEC: 1.01 — SIGNIFICANT CHANGE UP (ref 1.01–1.02)
UROBILINOGEN FLD QL: 8
VANCOMYCIN TROUGH SERPL-MCNC: 13.1 UG/ML — SIGNIFICANT CHANGE UP (ref 10–20)
WBC # BLD: 10.18 K/UL — SIGNIFICANT CHANGE UP (ref 3.8–10.5)
WBC # FLD AUTO: 10.18 K/UL — SIGNIFICANT CHANGE UP (ref 3.8–10.5)

## 2019-03-26 RX ORDER — RIVAROXABAN 15 MG-20MG
20 KIT ORAL EVERY 24 HOURS
Qty: 0 | Refills: 0 | Status: DISCONTINUED | OUTPATIENT
Start: 2019-03-27 | End: 2019-03-28

## 2019-03-26 RX ADMIN — Medication 1 TABLET(S): at 05:29

## 2019-03-26 RX ADMIN — ATORVASTATIN CALCIUM 10 MILLIGRAM(S): 80 TABLET, FILM COATED ORAL at 21:29

## 2019-03-26 RX ADMIN — HYDROMORPHONE HYDROCHLORIDE 1 MILLIGRAM(S): 2 INJECTION INTRAMUSCULAR; INTRAVENOUS; SUBCUTANEOUS at 09:25

## 2019-03-26 RX ADMIN — PIPERACILLIN AND TAZOBACTAM 25 GRAM(S): 4; .5 INJECTION, POWDER, LYOPHILIZED, FOR SOLUTION INTRAVENOUS at 13:50

## 2019-03-26 RX ADMIN — FAMOTIDINE 20 MILLIGRAM(S): 10 INJECTION INTRAVENOUS at 13:51

## 2019-03-26 RX ADMIN — Medication 1 TABLET(S): at 13:51

## 2019-03-26 RX ADMIN — PIPERACILLIN AND TAZOBACTAM 25 GRAM(S): 4; .5 INJECTION, POWDER, LYOPHILIZED, FOR SOLUTION INTRAVENOUS at 05:29

## 2019-03-26 RX ADMIN — HYDROMORPHONE HYDROCHLORIDE 1 MILLIGRAM(S): 2 INJECTION INTRAMUSCULAR; INTRAVENOUS; SUBCUTANEOUS at 08:50

## 2019-03-26 RX ADMIN — SODIUM CHLORIDE 50 MILLILITER(S): 9 INJECTION, SOLUTION INTRAVENOUS at 08:52

## 2019-03-26 RX ADMIN — LISINOPRIL 20 MILLIGRAM(S): 2.5 TABLET ORAL at 05:29

## 2019-03-26 RX ADMIN — PIPERACILLIN AND TAZOBACTAM 25 GRAM(S): 4; .5 INJECTION, POWDER, LYOPHILIZED, FOR SOLUTION INTRAVENOUS at 21:29

## 2019-03-26 RX ADMIN — Medication 1 TABLET(S): at 21:29

## 2019-03-26 RX ADMIN — AMLODIPINE BESYLATE 5 MILLIGRAM(S): 2.5 TABLET ORAL at 05:29

## 2019-03-26 NOTE — PROGRESS NOTE ADULT - SUBJECTIVE AND OBJECTIVE BOX
Patient is a 63y old  Male who presents with a chief complaint of LLE GANGRENE (25 Mar 2019 12:25)      Vascular Surgery Progress Note    Interval HPI: POD#1 s/p left AKA amputation. He is doing well. Denies pain    Medications:  piperacillin/tazobactam IVPB. 3.375      Allergies:  Allergies    No Known Allergies    Intolerances        Vital Signs Last 24 Hrs  T(C): 36.6 (26 Mar 2019 04:02), Max: 37.6 (25 Mar 2019 11:33)  T(F): 97.8 (26 Mar 2019 04:02), Max: 99.7 (25 Mar 2019 11:33)  HR: 99 (26 Mar 2019 05:24) (87 - 106)  BP: 115/76 (26 Mar 2019 05:24) (95/62 - 124/84)  BP(mean): --  RR: 18 (26 Mar 2019 04:02) (14 - 18)  SpO2: 95% (26 Mar 2019 04:02) (91% - 98%)  I&O's Summary    25 Mar 2019 07:01  -  26 Mar 2019 07:00  --------------------------------------------------------  IN: 1055 mL / OUT: 1715 mL / NET: -660 mL        Physical Exam:  Gen: NAD, A&Ox3  CV: No incr WOB  Left AKA wound is clean, dry, and intact. No infection or hematomas. The LENIN is draining moderate serosanguinous drainage.  Pulses:++ left femoral pulse    LABS:                        9.1    10.18 )-----------( 466      ( 26 Mar 2019 07:34 )             28.4     03-25    137  |  103  |  17  ----------------------------<  87  4.1   |  27  |  0.94    Ca    8.6      25 Mar 2019 08:04    TPro  8.4<H>  /  Alb  2.2<L>  /  TBili  0.5  /  DBili  x   /  AST  56<H>  /  ALT  33  /  AlkPhos  167<H>  03-24    PT/INR - ( 25 Mar 2019 08:04 )   PT: 15.9 sec;   INR: 1.38 ratio         PTT - ( 24 Mar 2019 10:30 )  PTT:42.8 sec

## 2019-03-26 NOTE — PROGRESS NOTE ADULT - ATTENDING COMMENTS
Stable s/p left AKA amputation ,OOB TC /  PT therapy. No heparin or Lovenox today. Restart Xarelto tomorrow ,case d/w Dr Stanley .Stop abx in am as per ID recommendation
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
## 2019-03-26 NOTE — PROGRESS NOTE ADULT - SUBJECTIVE AND OBJECTIVE BOX
ARELY REARDON [facility]    ALLERGY nka   CONTACT Son Percy R  self          Initial evaluation/Pulmonary Critical Care consultation requested on   3/24/2019 by Dr Blanton from Dr Sotelo   Patient examined chart reviewed    HOSPITAL ADMISSION [facility] 3/24/2019    PATIENT CAME  FROM (if information available)        TYPE OF VISIT      Subsequent Pulm FU       REASON FOR VISIT/PROBLEM LIST       GANGRENE FOOT   AKA L leg AMPUTATION done 3/25 Dr Stanley 3/26/2019 May restart xarelto 3/27 as per Dr Stanley   PATIENT ON VANCO (3/24)    HO DVT 2 M PTA 3/25 V duplex No DVT rle, lle nondiagnostic   ANEMIA norm 3/24-3/25/2019 Hb 8.7-7.6  Hyperlipidemia    Hypertension    PVD (peripheral vascular disease).  S/P CABG (coronary artery bypass graft).    BRIEF PATIENT  DESCRIPTION  HOSPITAL COURSE PATIENT ARELY REARDON 3/24/2019 ADMISSION [facility]  DR SAMPSON BLANTON      63 m with PMH DVT 2 m ago hyperlipidemia hypertension CABG PVD was admitted [facility] 3/24/2019 because of progressive foul smelling back discolored  l foot scheduled for amputation 3/25 Patient is on xarelto for dvt and Pulm consulted for periop management     home meds amlodipine 5 lisinopril 20 plavix 75 atorvastat 10 xarelto 20 tramadol 50 augmentin     Xarelto was held Patient was cleared for Pulm standpoint  3/25/2019 Pt had laka    3/26/2019 Cleared for xarelto for 3/27 by [facility]      VITALS/LABS PATIENT CARON MCGEE        3/26/2019 afeb 95 115/76 18 95%   3/26/2019 W 10.1 Hb 9.1 Plt 466  Na 137 K 4 CO2 26 Cr .8     REVIEW OF SYMPTOMS    Able to give ROS  Yes     RELIABLE No   CONSTITUTIONAL Weakness Yes  Chills No Vision changes No  ENDOCRINE No unexplained hair loss No heat or cold intolerance    ALLERGY No hives  Sore throat No   RESP Coughing blood no  Shortness of breath YES   NEURO No Headache  Confusion Pain neck No   CARDIAC No Chest pain No Palpitations   GI No Pain abdomen NO   Vomiting NO     PHYSICAL EXAM    HEENT Unremarkable PERRLA atraumatic   RESP Fair air entry EXP prolonged    Harsh breath sound Resp distres mild   CARDIAC S1 S2 No S3     NO JVD    ABDOMEN SOFT BS PRESENT NOT DISTENDED No hepatosplenomegaly PEDAL EDEMA present No calf tenderness  NO rash   GENERAL Not TOXIC looking
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
## 2019-03-26 NOTE — PROGRESS NOTE ADULT - SUBJECTIVE AND OBJECTIVE BOX
Patient is a 63y Male with a known history of :  Anemia, chronic disease (D63.8)  ASHD (arteriosclerotic heart disease) (I25.10)  Hyperlipidemia (E78.5)  DVT (deep venous thrombosis) (I82.409)  Thrombophlebitis of popliteal vein (513702455)  Prophylactic measure (Z29.9)  Immobility (Z74.09)  Malnutrition (E46)  Hypertension (I10)  PVD (peripheral vascular disease) (I73.9)  Gangrene (I96)    HPI:  64 yo male with H/O DVT (deep venous thrombosis ,nearly occlusive popliteal )on Xarelto   ,Hyperlipidemia  ,Hypertension and PVD (peripheral vascular disease) ,PAD ,LLE popliteal bypass ,s/p left 5th toe amputation ,CVA in 1987 with L hemiparesis and contractures ,hx of CAD , Quadriple bypass , left foot toe avulsion .  Patient  was sent  today from [facility]  SNF for evaluation of worsening necrotic left foot now foul smelling with gangrenous changes ,followed b y wound care team in facility and vascular sx evaluation was recommended . Seen by Dr Stanley in the office 2 days ago and scheduled for elective amputation tomorrow . XARELTO and PLAVIX are on hold since 03/22 No fever, chills, SOB and or pain in foot Patient was recently hospitalized in [facility] with LLE bleeding ( after he pulled the skin from left foot ) Admitted for septic workup and evaluation,send blood and urine cx,serial lactate levels,monitor vitals closley,ivfs hydration,monitor urine output and renal profile,iv abx initiated Palliative care consult requested ,to discuss advance directives and complete MOLST (24 Mar 2019 11:48)      REVIEW OF SYSTEMS:    CONSTITUTIONAL: No fever, weight loss, or fatigue  EYES: No eye pain, visual disturbances, or discharge  ENMT:  No difficulty hearing, tinnitus, vertigo; No sinus or throat pain  NECK: No pain or stiffness  BREASTS: No pain, masses, or nipple discharge  RESPIRATORY: No cough, wheezing, chills or hemoptysis; No shortness of breath  CARDIOVASCULAR: No chest pain, palpitations, dizziness, or leg swelling  GASTROINTESTINAL: No abdominal or epigastric pain. No nausea, vomiting, or hematemesis; No diarrhea or constipation. No melena or hematochezia.  GENITOURINARY: No dysuria, frequency, hematuria, or incontinence  NEUROLOGICAL: No headaches, memory loss, loss of strength, numbness, or tremors  SKIN: No itching, burning, rashes, or lesions   LYMPH NODES: No enlarged glands  ENDOCRINE: No heat or cold intolerance; No hair loss  MUSCULOSKELETAL: No joint pain or swelling; No muscle, back, or extremity pain  PSYCHIATRIC: No depression, anxiety, mood swings, or difficulty sleeping  HEME/LYMPH: No easy bruising, or bleeding gums  ALLERGY AND IMMUNOLOGIC: No hives or eczema    MEDICATIONS  (STANDING):  amLODIPine   Tablet 5 milliGRAM(s) Oral daily  atorvastatin 10 milliGRAM(s) Oral at bedtime  dextrose 5% + sodium chloride 0.45%. 1000 milliLiter(s) (50 mL/Hr) IV Continuous <Continuous>  famotidine    Tablet 20 milliGRAM(s) Oral daily  lactobacillus acidophilus 1 Tablet(s) Oral every 8 hours  lisinopril 20 milliGRAM(s) Oral daily  piperacillin/tazobactam IVPB. 3.375 Gram(s) IV Intermittent every 8 hours    MEDICATIONS  (PRN):  acetaminophen   Tablet .. 650 milliGRAM(s) Oral every 6 hours PRN Temp greater or equal to 38C (100.4F), Mild Pain (1 - 3)  HYDROmorphone  Injectable 1 milliGRAM(s) IV Push every 4 hours PRN Severe Pain (7 - 10)  HYDROmorphone  Injectable 0.5 milliGRAM(s) IV Push every 3 hours PRN Moderate Pain (4 - 6)      ALLERGIES: No Known Allergies      FAMILY HISTORY:      PHYSICAL EXAMINATION:  -----------------------------  T(C): 36.6 (03-26-19 @ 04:02), Max: 37.6 (03-25-19 @ 11:33)  HR: 99 (03-26-19 @ 05:24) (87 - 106)  BP: 115/76 (03-26-19 @ 05:24) (95/62 - 124/84)  RR: 18 (03-26-19 @ 04:02) (14 - 18)  SpO2: 95% (03-26-19 @ 04:02) (91% - 98%)  Wt(kg): --    03-25 @ 07:01 - 03-26 @ 07:00  --------------------------------------------------------  IN:    dextrose 5% + sodium chloride 0.45%.: 350 mL    IV PiggyBack: 350 mL    Packed Red Blood Cells: 355 mL  Total IN: 1055 mL    OUT:    Drain: 95 mL    Indwelling Catheter - Urethral: 1620 mL  Total OUT: 1715 mL    Total NET: -660 mL      03-26 @ 07:01 - 03-26 @ 08:37  --------------------------------------------------------  IN:    Oral Fluid: 240 mL  Total IN: 240 mL    OUT:  Total OUT: 0 mL    Total NET: 240 mL        Height (cm): 193.04 (03-25 @ 17:16)  Weight (kg): 83.5 (03-25 @ 17:16)  BMI (kg/m2): 22.4 (03-25 @ 17:16)  BSA (m2): 2.14 (03-25 @ 17:16)    Constitutional: well developed, normal appearance, well groomed, well nourished, no deformities and no acute distress.   Eyes: the conjunctiva exhibited no abnormalities and the eyelids demonstrated no xanthelasmas.   HEENT: normal oral mucosa, no oral pallor and no oral cyanosis.   Neck: normal jugular venous A waves present, normal jugular venous V waves present and no jugular venous cabrera A waves.   Pulmonary: no respiratory distress, normal respiratory rhythm and effort, no accessory muscle use and lungs were clear to auscultation bilaterally.   Cardiovascular: heart rate and rhythm were normal, normal S1 and S2 and no murmur, gallop, rub, heave or thrill are present.   Abdomen: soft, non-tender, no hepato-splenomegaly and no abdominal mass palpated.   Musculoskeletal: the gait could not be assessed..   Extremities: no clubbing of the fingernails, no localized cyanosis, no petechial hemorrhages and no ischemic changes.   Skin: normal skin color and pigmentation, no rash, no venous stasis, no skin lesions, no skin ulcer and no xanthoma was observed.   Psychiatric: oriented to person, place, and time, the affect was normal, the mood was normal and not feeling anxious.     LABS:   --------  03-26    137  |  104  |  10  ----------------------------<  109<H>  4.0   |  26  |  0.86    Ca    8.1<L>      26 Mar 2019 07:34    TPro  8.4<H>  /  Alb  2.2<L>  /  TBili  0.5  /  DBili  x   /  AST  56<H>  /  ALT  33  /  AlkPhos  167<H>  03-24                         9.1    10.18 )-----------( 466      ( 26 Mar 2019 07:34 )             28.4     PT/INR - ( 25 Mar 2019 08:04 )   PT: 15.9 sec;   INR: 1.38 ratio         PTT - ( 24 Mar 2019 10:30 )  PTT:42.8 sec        Culture Results:   No growth to date. (03-24 @ 14:01)  Culture Results:   No growth to date. (03-24 @ 14:01)      RADIOLOGY:  -----------------        ECG:

## 2019-03-27 LAB
ALBUMIN SERPL ELPH-MCNC: 1.8 G/DL — LOW (ref 3.3–5)
ALP SERPL-CCNC: 169 U/L — HIGH (ref 40–120)
ALT FLD-CCNC: 28 U/L — SIGNIFICANT CHANGE UP (ref 12–78)
ANION GAP SERPL CALC-SCNC: 9 MMOL/L — SIGNIFICANT CHANGE UP (ref 5–17)
AST SERPL-CCNC: 49 U/L — HIGH (ref 15–37)
BILIRUB SERPL-MCNC: 0.4 MG/DL — SIGNIFICANT CHANGE UP (ref 0.2–1.2)
BUN SERPL-MCNC: 9 MG/DL — SIGNIFICANT CHANGE UP (ref 7–23)
CALCIUM SERPL-MCNC: 8.3 MG/DL — LOW (ref 8.5–10.1)
CHLORIDE SERPL-SCNC: 102 MMOL/L — SIGNIFICANT CHANGE UP (ref 96–108)
CO2 SERPL-SCNC: 27 MMOL/L — SIGNIFICANT CHANGE UP (ref 22–31)
CREAT SERPL-MCNC: 0.76 MG/DL — SIGNIFICANT CHANGE UP (ref 0.5–1.3)
CULTURE RESULTS: NO GROWTH — SIGNIFICANT CHANGE UP
GLUCOSE SERPL-MCNC: 111 MG/DL — HIGH (ref 70–99)
HCT VFR BLD CALC: 27.5 % — LOW (ref 39–50)
HGB BLD-MCNC: 8.7 G/DL — LOW (ref 13–17)
INR BLD: 1.48 RATIO — HIGH (ref 0.88–1.16)
MCHC RBC-ENTMCNC: 27.7 PG — SIGNIFICANT CHANGE UP (ref 27–34)
MCHC RBC-ENTMCNC: 31.6 GM/DL — LOW (ref 32–36)
MCV RBC AUTO: 87.6 FL — SIGNIFICANT CHANGE UP (ref 80–100)
NRBC # BLD: 0 /100 WBCS — SIGNIFICANT CHANGE UP (ref 0–0)
PLATELET # BLD AUTO: 441 K/UL — HIGH (ref 150–400)
POTASSIUM SERPL-MCNC: 3.7 MMOL/L — SIGNIFICANT CHANGE UP (ref 3.5–5.3)
POTASSIUM SERPL-SCNC: 3.7 MMOL/L — SIGNIFICANT CHANGE UP (ref 3.5–5.3)
PREALB SERPL-MCNC: 5.4 MG/DL — LOW (ref 20–40)
PROT SERPL-MCNC: 7.3 G/DL — SIGNIFICANT CHANGE UP (ref 6–8.3)
PROTHROM AB SERPL-ACNC: 17.1 SEC — HIGH (ref 10–12.9)
RBC # BLD: 3.14 M/UL — LOW (ref 4.2–5.8)
RBC # FLD: 13.6 % — SIGNIFICANT CHANGE UP (ref 10.3–14.5)
SODIUM SERPL-SCNC: 138 MMOL/L — SIGNIFICANT CHANGE UP (ref 135–145)
SPECIMEN SOURCE: SIGNIFICANT CHANGE UP
WBC # BLD: 11.17 K/UL — HIGH (ref 3.8–10.5)
WBC # FLD AUTO: 11.17 K/UL — HIGH (ref 3.8–10.5)

## 2019-03-27 RX ORDER — CLOPIDOGREL BISULFATE 75 MG/1
75 TABLET, FILM COATED ORAL DAILY
Qty: 0 | Refills: 0 | Status: DISCONTINUED | OUTPATIENT
Start: 2019-03-28 | End: 2019-03-28

## 2019-03-27 RX ADMIN — FAMOTIDINE 20 MILLIGRAM(S): 10 INJECTION INTRAVENOUS at 13:01

## 2019-03-27 RX ADMIN — PIPERACILLIN AND TAZOBACTAM 25 GRAM(S): 4; .5 INJECTION, POWDER, LYOPHILIZED, FOR SOLUTION INTRAVENOUS at 05:51

## 2019-03-27 RX ADMIN — Medication 1 TABLET(S): at 20:45

## 2019-03-27 RX ADMIN — Medication 1 TABLET(S): at 13:01

## 2019-03-27 RX ADMIN — PIPERACILLIN AND TAZOBACTAM 25 GRAM(S): 4; .5 INJECTION, POWDER, LYOPHILIZED, FOR SOLUTION INTRAVENOUS at 13:01

## 2019-03-27 RX ADMIN — Medication 1 TABLET(S): at 05:51

## 2019-03-27 RX ADMIN — HYDROMORPHONE HYDROCHLORIDE 1 MILLIGRAM(S): 2 INJECTION INTRAMUSCULAR; INTRAVENOUS; SUBCUTANEOUS at 20:45

## 2019-03-27 RX ADMIN — RIVAROXABAN 20 MILLIGRAM(S): KIT at 17:00

## 2019-03-27 RX ADMIN — HYDROMORPHONE HYDROCHLORIDE 1 MILLIGRAM(S): 2 INJECTION INTRAMUSCULAR; INTRAVENOUS; SUBCUTANEOUS at 21:45

## 2019-03-27 RX ADMIN — PIPERACILLIN AND TAZOBACTAM 25 GRAM(S): 4; .5 INJECTION, POWDER, LYOPHILIZED, FOR SOLUTION INTRAVENOUS at 20:45

## 2019-03-27 RX ADMIN — ATORVASTATIN CALCIUM 10 MILLIGRAM(S): 80 TABLET, FILM COATED ORAL at 20:45

## 2019-03-27 NOTE — PROGRESS NOTE ADULT - SUBJECTIVE AND OBJECTIVE BOX
Patient is a 63y Male with a known history of :  Anemia, chronic disease (D63.8)  ASHD (arteriosclerotic heart disease) (I25.10)  Hyperlipidemia (E78.5)  DVT (deep venous thrombosis) (I82.409)  Thrombophlebitis of popliteal vein (106912958)  Prophylactic measure (Z29.9)  Immobility (Z74.09)  Malnutrition (E46)  Hypertension (I10)  PVD (peripheral vascular disease) (I73.9)  Gangrene (I96)    HPI:  62 yo male with H/O DVT (deep venous thrombosis ,nearly occlusive popliteal )on Xarelto   ,Hyperlipidemia  ,Hypertension and PVD (peripheral vascular disease) ,PAD ,LLE popliteal bypass ,s/p left 5th toe amputation ,CVA in 1987 with L hemiparesis and contractures ,hx of CAD , Quadriple bypass , left foot toe avulsion .  Patient  was sent  today from [facility]  SNF for evaluation of worsening necrotic left foot now foul smelling with gangrenous changes ,followed b y wound care team in facility and vascular sx evaluation was recommended . Seen by Dr Stanley in the office 2 days ago and scheduled for elective amputation tomorrow . XARELTO and PLAVIX are on hold since 03/22 No fever, chills, SOB and or pain in foot Patient was recently hospitalized in [facility] with LLE bleeding ( after he pulled the skin from left foot ) Admitted for septic workup and evaluation,send blood and urine cx,serial lactate levels,monitor vitals closley,ivfs hydration,monitor urine output and renal profile,iv abx initiated Palliative care consult requested ,to discuss advance directives and complete MOLST (24 Mar 2019 11:48)      REVIEW OF SYSTEMS:    CONSTITUTIONAL: No fever, weight loss, or fatigue  EYES: No eye pain, visual disturbances, or discharge  ENMT:  No difficulty hearing, tinnitus, vertigo; No sinus or throat pain  NECK: No pain or stiffness  BREASTS: No pain, masses, or nipple discharge  RESPIRATORY: No cough, wheezing, chills or hemoptysis; No shortness of breath  CARDIOVASCULAR: No chest pain, palpitations, dizziness, or leg swelling  GASTROINTESTINAL: No abdominal or epigastric pain. No nausea, vomiting, or hematemesis; No diarrhea or constipation. No melena or hematochezia.  GENITOURINARY: No dysuria, frequency, hematuria, or incontinence  NEUROLOGICAL: No headaches, memory loss, loss of strength, numbness, or tremors  SKIN: No itching, burning, rashes, or lesions   LYMPH NODES: No enlarged glands  ENDOCRINE: No heat or cold intolerance; No hair loss  MUSCULOSKELETAL: No joint pain or swelling; No muscle, back, or extremity pain  PSYCHIATRIC: No depression, anxiety, mood swings, or difficulty sleeping  HEME/LYMPH: No easy bruising, or bleeding gums  ALLERGY AND IMMUNOLOGIC: No hives or eczema    MEDICATIONS  (STANDING):  amLODIPine   Tablet 5 milliGRAM(s) Oral daily  atorvastatin 10 milliGRAM(s) Oral at bedtime  dextrose 5% + sodium chloride 0.45%. 1000 milliLiter(s) (50 mL/Hr) IV Continuous <Continuous>  famotidine    Tablet 20 milliGRAM(s) Oral daily  lactobacillus acidophilus 1 Tablet(s) Oral every 8 hours  lisinopril 20 milliGRAM(s) Oral daily  piperacillin/tazobactam IVPB. 3.375 Gram(s) IV Intermittent every 8 hours  rivaroxaban 20 milliGRAM(s) Oral every 24 hours    MEDICATIONS  (PRN):  acetaminophen   Tablet .. 650 milliGRAM(s) Oral every 6 hours PRN Temp greater or equal to 38C (100.4F), Mild Pain (1 - 3)  HYDROmorphone  Injectable 1 milliGRAM(s) IV Push every 4 hours PRN Severe Pain (7 - 10)  HYDROmorphone  Injectable 0.5 milliGRAM(s) IV Push every 3 hours PRN Moderate Pain (4 - 6)      ALLERGIES: No Known Allergies      FAMILY HISTORY:  Family history of hypertension (Father, Mother)      PHYSICAL EXAMINATION:  -----------------------------  T(C): 37 (03-27-19 @ 04:11), Max: 37.1 (03-26-19 @ 13:38)  HR: 112 (03-27-19 @ 04:11) (105 - 112)  BP: 109/70 (03-27-19 @ 04:11) (98/65 - 117/71)  RR: 17 (03-27-19 @ 04:11) (16 - 17)  SpO2: 95% (03-27-19 @ 04:11) (91% - 95%)  Wt(kg): --    03-26 @ 07:01  -  03-27 @ 07:00  --------------------------------------------------------  IN:    dextrose 5% + sodium chloride 0.45%.: 550 mL    IV PiggyBack: 100 mL    Oral Fluid: 240 mL  Total IN: 890 mL    OUT:    Drain: 30 mL    Indwelling Catheter - Urethral: 2200 mL  Total OUT: 2230 mL    Total NET: -1340 mL            Constitutional: well developed, normal appearance, well groomed, well nourished, no deformities and no acute distress.   Eyes: the conjunctiva exhibited no abnormalities and the eyelids demonstrated no xanthelasmas.   HEENT: normal oral mucosa, no oral pallor and no oral cyanosis.   Neck: normal jugular venous A waves present, normal jugular venous V waves present and no jugular venous cabrera A waves.   Pulmonary: no respiratory distress, normal respiratory rhythm and effort, no accessory muscle use and lungs were clear to auscultation bilaterally.   Cardiovascular: heart rate and rhythm were normal, normal S1 and S2 and no murmur, gallop, rub, heave or thrill are present.   Abdomen: soft, non-tender, no hepato-splenomegaly and no abdominal mass palpated.   Musculoskeletal: the gait could not be assessed..   Extremities: no clubbing of the fingernails, no localized cyanosis, no petechial hemorrhages and no ischemic changes.   Skin: normal skin color and pigmentation, no rash, no venous stasis, no skin lesions, no skin ulcer and no xanthoma was observed.   Psychiatric: oriented to person, place, and time, the affect was normal, the mood was normal and not feeling anxious.     LABS:   --------  03-27    138  |  102  |  9   ----------------------------<  111<H>  3.7   |  27  |  0.76    Ca    8.3<L>      27 Mar 2019 08:41    TPro  7.3  /  Alb  1.8<L>  /  TBili  0.4  /  DBili  x   /  AST  49<H>  /  ALT  28  /  AlkPhos  169<H>  03-27                         8.7    11.17 )-----------( 441      ( 27 Mar 2019 08:41 )             27.5     PT/INR - ( 27 Mar 2019 08:41 )   PT: 17.1 sec;   INR: 1.48 ratio                     RADIOLOGY:  -----------------        ECG:

## 2019-03-27 NOTE — SWALLOW BEDSIDE ASSESSMENT ADULT - COMMENTS
Pt. was alert for bedside swallowing evaluation. Pt. able to follow simple one step commands with clinician cues. Oral peripheral exam revealed lingual/labial ROM and strength to be WNL. Pt. trialed puree, mechanical soft and thin liquids via cup, spoon and straw presentation with impaired mastication abilities and reduced oral grading with puree and mechanical soft textures, oral holding with thin liquids, mildly delayed initiation of swallow and adequate hyolaryngeal elevation. Pt. with no overt s/s of penetration/aspiration following PO intake.
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
## 2019-03-27 NOTE — PROGRESS NOTE ADULT - SUBJECTIVE AND OBJECTIVE BOX
Patient is a 63y old  Male who presents with a chief complaint of LLE GANGRENE (27 Mar 2019 07:40)      Vascular Surgery Progress Note    Interval HPI: POD #2 s/p left AKA amputation. No complaints    Medications:  piperacillin/tazobactam IVPB. 3.375      Allergies:  Allergies    No Known Allergies    Intolerances        Vital Signs Last 24 Hrs  T(C): 37 (27 Mar 2019 04:11), Max: 37.1 (26 Mar 2019 13:38)  T(F): 98.6 (27 Mar 2019 04:11), Max: 98.8 (26 Mar 2019 13:38)  HR: 112 (27 Mar 2019 04:11) (105 - 112)  BP: 109/70 (27 Mar 2019 04:11) (98/65 - 117/71)  BP(mean): --  RR: 17 (27 Mar 2019 04:11) (16 - 17)  SpO2: 95% (27 Mar 2019 04:11) (91% - 95%)  I&O's Summary    26 Mar 2019 07:01  -  27 Mar 2019 07:00  --------------------------------------------------------  IN: 890 mL / OUT: 2230 mL / NET: -1340 mL        Physical Exam:  Gen: NAD, A&Ox3  CV: No incr WOB  Left AKA wound is clean and intact. No infection. LENIN drain with a small amount of serosanguinous draianage    LABS:                        9.1    10.18 )-----------( 466      ( 26 Mar 2019 07:34 )             28.4     03-26    137  |  104  |  10  ----------------------------<  109<H>  4.0   |  26  |  0.86    Ca    8.1<L>      26 Mar 2019 07:34

## 2019-03-27 NOTE — PROGRESS NOTE ADULT - ATTENDING COMMENTS
Stable s/p left AKA amputation ,OOB TC /  PT therapy. Restart Xarelto today and plavix tomorrow  ,case d/w Dr Stanley .Stop abx as per ID recommendation ANTICIPATE D/C TO [facility] IN 24 HRS AFTER DRAINAGE REMOVED BY VASC SX

## 2019-03-27 NOTE — PROGRESS NOTE ADULT - SUBJECTIVE AND OBJECTIVE BOX
Patient was examined Chart reviewed Detailed note will be inserted below after going over latest data Meanwhile please refer to my previous notes for Pulmonary/Critical Care assessment recommendations   Edw[place] to be started Patient was examined Chart reviewed Detailed note will be inserted below after going over latest data Meanwhile please refer to my previous notes for Pulmonary/Critical Care assessment recommendations   [place] to be started     ARELY REARDON [facility]    ALLERGY nka   CONTACT Gareth GAMINO  self          Initial evaluation/Pulmonary Critical Care consultation requested on   3/24/2019 by Dr Blanton from Dr Sotelo   Patient examined chart reviewed    HOSPITAL ADMISSION [facility] 3/24/2019    PATIENT CAME  FROM (if information available)        TYPE OF VISIT      Subsequent Pulm FU       REASON FOR VISIT/PROBLEM LIST       GANGRENE FOOT   AKA L leg AMPUTATION done 3/25 Dr Stanley 3/26/2019 May restart xarelto 3/27 as per Dr Stanley   PATIENT ON VANCO (3/24)    HO DVT 2 M PTA 3/25 V duplex No DVT rle, lle nondiagnostic   ANEMIA norm 3/24-3/25/2019 Hb 8.7-7.6  Hyperlipidemia    Hypertension    PVD (peripheral vascular disease).  S/P CABG (coronary artery bypass graft).    BRIEF PATIENT  DESCRIPTION  HOSPITAL COURSE PATIENT ARELY REARDON 3/24/2019 ADMISSION [facility]  DR SAMPSON BLANTON      63 m with PMH DVT 2 m ago hyperlipidemia hypertension CABG PVD was admitted [facility] 3/24/2019 because of progressive foul smelling back discolored  l foot scheduled for amputation 3/25 Patient is on xarelto for dvt and Pulm consulted for periop management     home meds amlodipine 5 lisinopril 20 plavix 75 atorvastat 10 xarelto 20 tramadol 50 augmentin     Xarelto was held Patient was cleared for Pulm standpoint  3/25/2019 Pt had laka    3/26/2019 Cleared for xarelto for 3/27 by vasc    3/27/2019 xarelto restrtd     VITALS/LABS PATIENT CARON MCGEE        3/27/2019 99f 115 105/70 16 92% 73   3/27/2019 w 11.1 Hb 8.7Plt 441 inr 148 Na 138 K 3.7 CO2 27 Cr .7     REVIEW OF SYMPTOMS    Able to give ROS  Yes     RELIABLE No   CONSTITUTIONAL Weakness Yes  Chills No Vision changes No  ENDOCRINE No unexplained hair loss No heat or cold intolerance    ALLERGY No hives  Sore throat No   RESP Coughing blood no  Shortness of breath YES   NEURO No Headache  Confusion Pain neck No   CARDIAC No Chest pain No Palpitations   GI No Pain abdomen NO   Vomiting NO     PHYSICAL EXAM    HEENT Unremarkable PERRLA atraumatic   RESP Fair air entry EXP prolonged    Harsh breath sound Resp distres mild   CARDIAC S1 S2 No S3     NO JVD    ABDOMEN SOFT BS PRESENT NOT DISTENDED No hepatosplenomegaly PEDAL EDEMA present No calf tenderness  NO rash   GENERAL Not TOXIC looking

## 2019-03-28 ENCOUNTER — TRANSCRIPTION ENCOUNTER (OUTPATIENT)
Age: 64
End: 2019-03-28

## 2019-03-28 VITALS
OXYGEN SATURATION: 96 % | RESPIRATION RATE: 18 BRPM | DIASTOLIC BLOOD PRESSURE: 77 MMHG | TEMPERATURE: 98 F | SYSTOLIC BLOOD PRESSURE: 107 MMHG | HEART RATE: 107 BPM

## 2019-03-28 LAB
HCT VFR BLD CALC: 29.5 % — LOW (ref 39–50)
HGB BLD-MCNC: 9.3 G/DL — LOW (ref 13–17)
MCHC RBC-ENTMCNC: 27.8 PG — SIGNIFICANT CHANGE UP (ref 27–34)
MCHC RBC-ENTMCNC: 31.5 GM/DL — LOW (ref 32–36)
MCV RBC AUTO: 88.3 FL — SIGNIFICANT CHANGE UP (ref 80–100)
NRBC # BLD: 0 /100 WBCS — SIGNIFICANT CHANGE UP (ref 0–0)
PLATELET # BLD AUTO: 438 K/UL — HIGH (ref 150–400)
RBC # BLD: 3.34 M/UL — LOW (ref 4.2–5.8)
RBC # FLD: 13.5 % — SIGNIFICANT CHANGE UP (ref 10.3–14.5)
WBC # BLD: 8.29 K/UL — SIGNIFICANT CHANGE UP (ref 3.8–10.5)
WBC # FLD AUTO: 8.29 K/UL — SIGNIFICANT CHANGE UP (ref 3.8–10.5)

## 2019-03-28 PROCEDURE — 88307 TISSUE EXAM BY PATHOLOGIST: CPT

## 2019-03-28 PROCEDURE — 80048 BASIC METABOLIC PNL TOTAL CA: CPT

## 2019-03-28 PROCEDURE — 83605 ASSAY OF LACTIC ACID: CPT

## 2019-03-28 PROCEDURE — 80053 COMPREHEN METABOLIC PANEL: CPT

## 2019-03-28 PROCEDURE — 97162 PT EVAL MOD COMPLEX 30 MIN: CPT

## 2019-03-28 PROCEDURE — 87040 BLOOD CULTURE FOR BACTERIA: CPT

## 2019-03-28 PROCEDURE — 87086 URINE CULTURE/COLONY COUNT: CPT

## 2019-03-28 PROCEDURE — 85610 PROTHROMBIN TIME: CPT

## 2019-03-28 PROCEDURE — 36415 COLL VENOUS BLD VENIPUNCTURE: CPT

## 2019-03-28 PROCEDURE — 85730 THROMBOPLASTIN TIME PARTIAL: CPT

## 2019-03-28 PROCEDURE — 80202 ASSAY OF VANCOMYCIN: CPT

## 2019-03-28 PROCEDURE — 84134 ASSAY OF PREALBUMIN: CPT

## 2019-03-28 PROCEDURE — 81001 URINALYSIS AUTO W/SCOPE: CPT

## 2019-03-28 PROCEDURE — 97530 THERAPEUTIC ACTIVITIES: CPT

## 2019-03-28 PROCEDURE — 85027 COMPLETE CBC AUTOMATED: CPT

## 2019-03-28 PROCEDURE — 86901 BLOOD TYPING SEROLOGIC RH(D): CPT

## 2019-03-28 PROCEDURE — 86923 COMPATIBILITY TEST ELECTRIC: CPT

## 2019-03-28 PROCEDURE — 93005 ELECTROCARDIOGRAM TRACING: CPT

## 2019-03-28 PROCEDURE — 99285 EMERGENCY DEPT VISIT HI MDM: CPT | Mod: 25

## 2019-03-28 PROCEDURE — 86803 HEPATITIS C AB TEST: CPT

## 2019-03-28 PROCEDURE — P9016: CPT

## 2019-03-28 PROCEDURE — 36430 TRANSFUSION BLD/BLD COMPNT: CPT

## 2019-03-28 PROCEDURE — 71045 X-RAY EXAM CHEST 1 VIEW: CPT

## 2019-03-28 PROCEDURE — 88311 DECALCIFY TISSUE: CPT

## 2019-03-28 PROCEDURE — 93970 EXTREMITY STUDY: CPT

## 2019-03-28 PROCEDURE — 86850 RBC ANTIBODY SCREEN: CPT

## 2019-03-28 PROCEDURE — 86900 BLOOD TYPING SEROLOGIC ABO: CPT

## 2019-03-28 RX ORDER — AMLODIPINE BESYLATE 2.5 MG/1
1 TABLET ORAL
Qty: 0 | Refills: 0 | COMMUNITY

## 2019-03-28 RX ORDER — TRAMADOL HYDROCHLORIDE 50 MG/1
0 TABLET ORAL
Qty: 0 | Refills: 0 | COMMUNITY

## 2019-03-28 RX ORDER — CLOPIDOGREL BISULFATE 75 MG/1
1 TABLET, FILM COATED ORAL
Qty: 0 | Refills: 0 | COMMUNITY

## 2019-03-28 RX ORDER — AMLODIPINE BESYLATE 2.5 MG/1
1 TABLET ORAL
Qty: 0 | Refills: 0 | COMMUNITY
Start: 2019-03-28

## 2019-03-28 RX ORDER — LISINOPRIL 2.5 MG/1
1 TABLET ORAL
Qty: 0 | Refills: 0 | COMMUNITY

## 2019-03-28 RX ORDER — LACTOBACILLUS ACIDOPHILUS 100MM CELL
2 CAPSULE ORAL
Qty: 0 | Refills: 0 | DISCHARGE
Start: 2019-03-28

## 2019-03-28 RX ORDER — CLOPIDOGREL BISULFATE 75 MG/1
1 TABLET, FILM COATED ORAL
Qty: 0 | Refills: 0 | COMMUNITY
Start: 2019-03-28

## 2019-03-28 RX ORDER — ATORVASTATIN CALCIUM 80 MG/1
1 TABLET, FILM COATED ORAL
Qty: 0 | Refills: 0 | COMMUNITY
Start: 2019-03-28

## 2019-03-28 RX ORDER — FAMOTIDINE 10 MG/ML
1 INJECTION INTRAVENOUS
Qty: 0 | Refills: 0 | DISCHARGE
Start: 2019-03-28

## 2019-03-28 RX ORDER — RIVAROXABAN 15 MG-20MG
1 KIT ORAL
Qty: 0 | Refills: 0 | COMMUNITY

## 2019-03-28 RX ORDER — RIVAROXABAN 15 MG-20MG
1 KIT ORAL
Qty: 0 | Refills: 0 | DISCHARGE
Start: 2019-03-28

## 2019-03-28 RX ORDER — ACETAMINOPHEN 500 MG
2 TABLET ORAL
Qty: 0 | Refills: 0 | COMMUNITY
Start: 2019-03-28

## 2019-03-28 RX ORDER — ATORVASTATIN CALCIUM 80 MG/1
1 TABLET, FILM COATED ORAL
Qty: 0 | Refills: 0 | COMMUNITY

## 2019-03-28 RX ORDER — TRAMADOL HYDROCHLORIDE 50 MG/1
0.5 TABLET ORAL
Qty: 0 | Refills: 0 | COMMUNITY

## 2019-03-28 RX ADMIN — FAMOTIDINE 20 MILLIGRAM(S): 10 INJECTION INTRAVENOUS at 11:07

## 2019-03-28 RX ADMIN — Medication 1 TABLET(S): at 13:03

## 2019-03-28 RX ADMIN — RIVAROXABAN 20 MILLIGRAM(S): KIT at 17:11

## 2019-03-28 RX ADMIN — Medication 1 TABLET(S): at 05:50

## 2019-03-28 RX ADMIN — CLOPIDOGREL BISULFATE 75 MILLIGRAM(S): 75 TABLET, FILM COATED ORAL at 11:07

## 2019-03-28 NOTE — PROGRESS NOTE ADULT - PROVIDER SPECIALTY LIST ADULT
Cardiology
Hospitalist
Infectious Disease
Infectious Disease
Pulmonology
Vascular Surgery
Hospitalist

## 2019-03-28 NOTE — PROGRESS NOTE ADULT - ASSESSMENT
62 yo male with H/O DVT (deep venous thrombosis ,nearly occlusive popliteal )on Xarelto   ,Hyperlipidemia  ,Hypertension and PVD (peripheral vascular disease) ,PAD ,LLE popliteal bypass ,s/p left 5th toe amputation ,CVA in 1987 with L hemiparesis and contractures ,hx of CAD , Quadriple bypass , left foot toe avulsion .  Patient  was sent  today from [facility]  SNF for evaluation of worsening necrotic left foot now foul smelling with gangrenous changes ,followed b y wound care team in facility and vascular sx evaluation was recommended . Seen by Dr Stanley in the office 2 days ago and scheduled for elective amputation tomorrow . XARELTO and PLAVIX are on hold since 03/22 No fever, chills, SOB and or pain in foot Patient was recently hospitalized in [facility] with LLE bleeding ( after he pulle dthe skin from left foot  Admitted for septic workup and evaluation,send blood and urine cx,serial lactate levels,monitor vitals closley,ivfs hydration,monitor urine output and renal profile,iv abx initiated Palliative care consult requested ,to discuss advance directives and complete MOLST
64 yo male with H/O DVT (deep venous thrombosis ,nearly occlusive popliteal )on Xarelto   ,Hyperlipidemia  ,Hypertension and PVD (peripheral vascular disease) ,PAD ,LLE popliteal bypass ,s/p left 5th toe amputation ,CVA in 1987 with L hemiparesis and contractures ,hx of CAD , Quadriple bypass , left foot toe avulsion .  Patient  was sent  today from [facility]  SNF for evaluation of worsening necrotic left foot now foul smelling with gangrenous changes ,followed b y wound care team in facility and vascular sx evaluation was recommended . Seen by Dr Stanley in the office 2 days ago and scheduled for elective amputation tomorrow . XARELTO and PLAVIX are on hold since 03/22 No fever, chills, SOB and or pain in foot Patient was recently hospitalized in [facility] with LLE bleeding ( after he pulle dthe skin from left foot  Admitted for septic workup and evaluation,send blood and urine cx,serial lactate levels,monitor vitals closley,ivfs hydration,monitor urine output and renal profile,iv abx initiated Palliative care consult requested ,to discuss advance directives and complete MOLST
Clinically stable s/p left AKA amputation. From a surgical standpoint the patient may be discharged back to the  nursing home. I will remove the  staples and sutures in 2-3 weeks.
GLOBAL ISSUE/BEST PRACTICE:      PROBLEM: HOB elevation:   y            PROBLEM: Stress ulcer proph:    pepcid (3/24)                       PROBLEM: VTE prophylaxis:      VTE pplx held for planned surgery (3/24)   PROBLEM: Glycemic control:    na  PROBLEM: Nutrition:    DASH (3/24)   PROBLEM: Advanced directive: na     PROBLEM: Allergies:  na    MEDICATIONS  PATIENT   CARON MCGEE      ANTIBIOTICS   Zosyn (3/24)   Vanco 1.2 (3/24)   HYPERTENSION   norvasc 5 (3/24)   lisinopril 20 (3/24)   HLD   atorvastat 10 93/24)   SUP   Pepcid (3/24)   PAIN  Perccocet (3/24)       ASSESSMENT RECOMMENDATIONS PATIENT ARELY REARDON 3/24/2019 ADMISSION [facility]  DR SAMPSON BLANTON       GANGRENE FOOT   CLEARANCE FOR AMPUTATION  A/R Benefots outweigh risks Pt cleared for urgent surgery from Pulm standpoint Will restart xarelto when cleared by surgery  PATIENT ON VANCO (3/24)    A/R Will monitor Vanco lvls and cr  HO DVT 2 M PTA 3/25 V duplex No DVT rle, lle nondiagnostic   A/R To restart ac asap after surgery once cleared by surgeon   ANEMIA norm 3/24-3/25/2019 Hb 8.7-7.6  Hyperlipidemia    Hypertension    PVD (peripheral vascular disease).  S/P CABG (coronary artery bypass graft).    TIME SPENT Over 25 minutes aggregate care time spent on encounter; activities included   direct patient care, counseling and/or coordinating care reviewing notes, lab data/ imaging , discussion with multidisciplinary team/ patient  /family. Risks, benefits, alternatives  discussed in detail.
GLOBAL ISSUE/BEST PRACTICE:      PROBLEM: HOB elevation:   y            PROBLEM: Stress ulcer proph:    pepcid (3/24)                       PROBLEM: VTE prophylaxis:      VTE pplx held for planned surgery (3/24)   PROBLEM: Glycemic control:    na  PROBLEM: Nutrition:    DASH (3/24)   PROBLEM: Advanced directive: na     PROBLEM: Allergies:  na    MEDICATIONS  PATIENT   CARON MCGEE      ANTIBIOTICS   Zosyn (3/24)   Vanco 1.2 (3/24)   VANCO LEVEL  3/26/2019 Vanco T 13   HYPERTENSION   norvasc 5 (3/24)   lisinopril 20 (3/24)   HLD   atorvastat 10 93/24)   SUP   Pepcid (3/24)   PAIN  Perccocet (3/24)       ASSESSMENT RECOMMENDATIONS PATIENT ARELY REARDON 3/24/2019 ADMISSION [facility]  DR SAMPSON BLANTON     GANGRENE FOOT   AKA L leg AMPUTATION done 3/25 Dr Stanley 3/26/2019 May restart xarelto 3/27 as per Dr Stanley   PATIENT ON VANCO (3/24)  3/26/2019 Vanco T 13   HO DVT 2 M PTA 3/25 V duplex No DVT rle, lle nondiagnostic  3/26/2019 May restart xarelto 3/27 as per Dr Stanley   ANEMIA norm 3/24-3/25-3/26/2019 Hb 8.7-7.6-9.1   Hyperlipidemia    Hypertension    PVD (peripheral vascular disease).  S/P CABG (coronary artery bypass graft).    TIME SPENT Over 25 minutes aggregate care time spent on encounter; activities included   direct patient care, counseling and/or coordinating care reviewing notes, lab data/ imaging , discussion with multidisciplinary team/ patient  /family. Risks, benefits, alternatives  discussed in detail.
Stable s/p left AKA amputation. I will order PT therapy. No heparin or Lovenox today. Restart Xarelto tomorrow.
Stable s/p left AKA. It is ok to resume Xarelto. Continue PT therapy. Will remove LENIN drain in 48 hours.
pt with gangrene of left foof   pvd  s/p cva 1987   s/p dvt   hypertension  dyslipidemia  ekg - rsr - t wave changes  no angina- no chf   cardiac status stable low to  - moderate  risk  for above knee amputation of left leg-discussed with son in grat detail understands the risk involved  no angina - no chf - cardiac status stable low to modetate risk( abnormal ekg) for aka of left leg 3/25
pt with gangrene of left foof   pvd  s/p cva 1987   s/p dvt   hypertension  dyslipidemia  ekg - rsr - t wave changes  no angina- no chf   cardiac status stable low to  - moderate  risk  for above knee amputation of left leg-discussed with son in grat detail understands the risk involved  no angina - no chf - cardiac status stable low to modetate risk( abnormal ekg) for aka of left leg 3/25  pt tolerated aka of left leg 3/25
62 yo male with H/O DVT (deep venous thrombosis ,nearly occlusive popliteal )on Xarelto   ,Hyperlipidemia  ,Hypertension and PVD (peripheral vascular disease) ,PAD ,LLE popliteal bypass ,s/p left 5th toe amputation ,CVA in 1987 with L hemiparesis and contractures ,hx of CAD , Quadriple bypass , left foot toe avulsion .  Patient  was sent  today from [facility]  SNF for evaluation of worsening necrotic left foot now foul smelling with gangrenous changes ,followed b y wound care team in facility and vascular sx evaluation was recommended . Seen by Dr Stanley in the office 2 days ago and scheduled for elective amputation tomorrow . XARELTO and PLAVIX are on hold since 03/22 No fever, chills, SOB and or pain in foot Patient was recently hospitalized in [facility] with LLE bleeding ( after he pulle dthe skin from left foot  Admitted for septic workup and evaluation,send blood and urine cx,serial lactate levels,monitor vitals closley,ivfs hydration,monitor urine output and renal profile,iv abx initiated Palliative care consult requested ,to discuss advance directives and complete MOLST
GLOBAL ISSUE/BEST PRACTICE:      PROBLEM: HOB elevation:   y            PROBLEM: Stress ulcer proph:    pepcid (3/24)                       PROBLEM: VTE prophylaxis:      VTE pplx held for planned surgery (3/24) ch rivarox 20 (3/27)  PROBLEM: Glycemic control:    na  PROBLEM: Nutrition:    DASH (3/24) ch dys 1 puree thin Ensure enlive .3 (3/27)   PROBLEM: Advanced directive: na     PROBLEM: Allergies:  na    MEDICATIONS  PATIENT   CARON MCGEE      ANTIBIOTICS   Zosyn (3/24)   Vanco 1.2 (3/24-3/25)   VANCO LEVEL  3/26/2019 Vanco T 13   HYPERTENSION   norvasc 5 (3/24)   lisinopril 20 (3/24)   HLD   atorvastat 10 93/24)   SUP   Pepcid (3/24)   PAIN  Perccocet (3/24)   CAD   plavix (3/27)  DVT   rivaroxaban 20 (3/26)       ASSESSMENT RECOMMENDATIONS PATIENT ARELY REARDON 3/24/2019 ADMISSION [facility]  DR SAMPSON BLANTON     GANGRENE FOOT   AKA L leg AMPUTATION done 3/25 Dr Stanley 3/26/2019 Restarted  xarelto 3/27 after cleared  Dr Stanley   PATIENT WAS ON VANCO (3/24)  3/26/2019 Vanco T 13   HO DVT 2 M PTA 3/25 V duplex No DVT rle, lle nondiagnostic  Restarted  xarelto 3/27 after cleared  Dr Stanley   ANEMIA norm 3/24-3/25-3/26-3/27-3/28/2019 Hb 8.7-7.6-9.1 - 8.7 - 9.3 In part abla and acd  Hyperlipidemia    Hypertension    PVD (peripheral vascular disease).  S/P CABG (coronary artery bypass graft). plvx    TIME SPENT Over 25 minutes aggregate care time spent on encounter; activities included   direct patient care, counseling and/or coordinating care reviewing notes, lab data/ imaging , discussion with multidisciplinary team/ patient  /family. Risks, benefits, alternatives  discussed in detail
GLOBAL ISSUE/BEST PRACTICE:      PROBLEM: HOB elevation:   y            PROBLEM: Stress ulcer proph:    pepcid (3/24)                       PROBLEM: VTE prophylaxis:      VTE pplx held for planned surgery (3/24) ch rivarox 20 (3/27)  PROBLEM: Glycemic control:    na  PROBLEM: Nutrition:    DASH (3/24) ch dys 1 puree thin Ensure enlive .3 (3/27)   PROBLEM: Advanced directive: na     PROBLEM: Allergies:  na    MEDICATIONS  PATIENT   CARON MCGEE      ANTIBIOTICS   Zosyn (3/24)   Vanco 1.2 (3/24-3/25)   VANCO LEVEL  3/26/2019 Vanco T 13   HYPERTENSION   norvasc 5 (3/24)   lisinopril 20 (3/24)   HLD   atorvastat 10 93/24)   SUP   Pepcid (3/24)   PAIN  Perccocet (3/24)   CAD   plavix (3/27)  DVT   rivaroxaban 20 (3/26)       ASSESSMENT RECOMMENDATIONS PATIENT ARELY REARDON 3/24/2019 ADMISSION [facility]  DR SAMPSON BLANTON     GANGRENE FOOT   AKA L leg AMPUTATION done 3/25 Dr Stanley 3/26/2019 Restarted  xarelto 3/27 after cleared  Dr Stanley   PATIENT WAS ON VANCO (3/24)  3/26/2019 Vanco T 13   HO DVT 2 M PTA 3/25 V duplex No DVT rle, lle nondiagnostic  Restarted  xarelto 3/27 after cleared  Dr Stanley   ANEMIA norm 3/24-3/25-3/26-3/27/2019 Hb 8.7-7.6-9.1 - 8.7 In part abla and acd  Hyperlipidemia    Hypertension    PVD (peripheral vascular disease).  S/P CABG (coronary artery bypass graft). plvx    TIME SPENT Over 25 minutes aggregate care time spent on encounter; activities included   direct patient care, counseling and/or coordinating care reviewing notes, lab data/ imaging , discussion with multidisciplinary team/ patient  /family. Risks, benefits, alternatives  discussed in detail.
64 yo male with H/O DVT (deep venous thrombosis ,nearly occlusive popliteal )on Xarelto   ,Hyperlipidemia  ,Hypertension and PVD (peripheral vascular disease) ,PAD ,LLE popliteal bypass ,s/p left 5th toe amputation ,CVA in 1987 with L hemiparesis and contractures ,hx of CAD , Quadriple bypass , left foot toe avulsion .  Patient  was sent  today from [facility]  SNF for evaluation of worsening necrotic left foot now foul smelling with gangrenous changes ,followed b y wound care team in facility and vascular sx evaluation was recommended . Seen by Dr Stanley in the office 2 days ago and scheduled for elective amputation tomorrow . XARELTO and PLAVIX are on hold since 03/22 No fever, chills, SOB and or pain in foot Patient was recently hospitalized in [facility] with LLE bleeding ( after he pulle dthe skin from left foot  Admitted for septic workup and evaluation,send blood and urine cx,serial lactate levels,monitor vitals closley,ivfs hydration,monitor urine output and renal profile,iv abx initiated Palliative care consult requested ,to discuss advance directives and complete MOLST

## 2019-03-28 NOTE — PROGRESS NOTE ADULT - SUBJECTIVE AND OBJECTIVE BOX
Patient was examined Chart reviewed Detailed note will be inserted below after going over latest data Meanwhile please refer to my previous notes for Pulmonary/Critical Care assessment recommendations   On romulo Patient was examined Chart reviewed Detailed note will be inserted below after going over latest data Meanwhile please refer to my previous notes for Pulmonary/Critical Care assessment recommendations   On rivar    ARELY REARDON [facility]    ALLERGY nka   CONTACT Son Percy GAMINO  self          Initial evaluation/Pulmonary Critical Care consultation requested on   3/24/2019 by Dr Oropeza from Dr Sotelo   Patient examined chart reviewed    HOSPITAL ADMISSION [facility] 3/24/2019    PATIENT CAME  FROM (if information available)        TYPE OF VISIT      Subsequent Pulm FU       REASON FOR VISIT/PROBLEM LIST       GANGRENE FOOT   AKA L leg AMPUTATION done 3/25 Dr Stanley 3/26/2019 May restart xarelto 3/27 as per Dr Stanley   PATIENT ON VANCO (3/24)    HO DVT 2 M PTA 3/25 V duplex No DVT rle, lle nondiagnostic   ANEMIA norm 3/24-3/25/2019 Hb 8.7-7.6  Hyperlipidemia    Hypertension    PVD (peripheral vascular disease).  S/P CABG (coronary artery bypass graft).    VITALS/LABS PATIENT CARON MCGEE        3/28/2019 afeb 107 107/77 18 96%   3/28/2019 w 8.2 Hb 9.3 Plt 438   3/27/2019 99f 115 105/70 16 92% 73   3/27/2019 w 11.1 Hb 8.7Plt 441 inr 148 Na 138 K 3.7 CO2 27 Cr .7     REVIEW OF SYMPTOMS    Able to give ROS  Yes     RELIABLE No   CONSTITUTIONAL Weakness Yes  Chills No Vision changes No  ENDOCRINE No unexplained hair loss No heat or cold intolerance    ALLERGY No hives  Sore throat No   RESP Coughing blood no  Shortness of breath YES   NEURO No Headache  Confusion Pain neck No   CARDIAC No Chest pain No Palpitations   GI No Pain abdomen NO   Vomiting NO     PHYSICAL EXAM    HEENT Unremarkable PERRLA atraumatic   RESP Fair air entry EXP prolonged    Harsh breath sound Resp distres mild   CARDIAC S1 S2 No S3     NO JVD    ABDOMEN SOFT BS PRESENT NOT DISTENDED No hepatosplenomegaly PEDAL EDEMA present No calf tenderness  NO rash   GENERAL Not TOXIC looking

## 2019-03-28 NOTE — PHARMACOTHERAPY INTERVENTION NOTE - COMMENTS
Medication: Rivaroxaban 20mg QPM   Indication: DVT/PE Tx  Family members/Caregiver present: No.   Handout provided: Yes  Provided information regarding dose, indication, what to do if miss dose, and possible side effects.    Anticoagulation education provided and accepted.
Patient on 1000mg vancomycin q12h for gangrene.     Recommended dose increase based on weight of 83.9kg @ 15-20mg/kg initial dose of vancomycin = 1258 - 1678mg range. Entered order for 1500mg q12h and ordered trough for pre-4th.

## 2019-03-28 NOTE — DISCHARGE NOTE PROVIDER - HOSPITAL COURSE
62 yo male with H/O DVT (deep venous thrombosis ,nearly occlusive popliteal )on Xarelto   ,Hyperlipidemia  ,Hypertension and PVD (peripheral vascular disease) ,PAD ,LLE popliteal bypass ,s/p left 5th toe amputation ,CVA in 1987 with L hemiparesis and contractures ,hx of CAD , Quadriple bypass , left foot toe avulsion .  Patient  was sent  today from [facility]  SNF for evaluation of worsening necrotic left foot now foul smelling with gangrenous changes ,followed b y wound care team in facility and vascular sx evaluation was recommended . Seen by Dr Stanley in the office 2 days ago and scheduled for elective amputation tomorrow . XARELTO and PLAVIX are on hold since 03/22 No fever, chills, SOB and or pain in foot Patient was recently hospitalized in [facility] with LLE bleeding ( after he pulle dthe skin from left foot  Admitted for septic workup and evaluation,send blood and urine cx,serial lactate levels,monitor vitals closley,ivfs hydration,monitor urine output and renal profile,iv abx initiated Palliative care consult requested ,to discuss advance directives and complete MOLST     ·  Problem: Gangrene.  Plan: Stable s/p left AKA amputation ,OOB TC /  PT therapy.     ·  Problem: Anemia, chronic disease.  Plan: SERIAL H/H ,transfusion prn.     ·  Problem: DVT (deep venous thrombosis).  Plan: Resume xarelto in am.     ·  Problem: PVD (peripheral vascular disease).  Plan: continue current management and present  medications.     ·  Problem: Hypertension.  Plan: continue home medications ,card clearance requested.     Problem: Malnutrition. Plan: nutritionist consult ,speech and swallow eval.    ·  Problem: Immobility.  Plan: Palliative care consult requested ,to discuss advance directives and complete MOLST.     ·  Problem: Prophylactic measure.  Plan: Gastrointestinal stress ulcer prophylaxis and DVT prophylaxis administrated.

## 2019-03-28 NOTE — DISCHARGE NOTE PROVIDER - CARE PROVIDER_API CALL
Rodolfo Stanley)  Surgery  [address], [address]  [place], [place] [id number]  Phone: [phone]  Fax: [phone]  Follow Up Time: Rodolfo Stanley)  Surgery  [address], [address]  [place], [place] [id number]  Phone: [phone]  Fax: [phone]  Follow Up Time: 2 weeks

## 2019-03-28 NOTE — PROGRESS NOTE ADULT - SUBJECTIVE AND OBJECTIVE BOX
Patient is a 63y old  Male who presents with a chief complaint of LLE GANGRENE (28 Mar 2019 13:43)      Vascular Surgery Progress Note    Interval HPI: POD #3 s/p left AKA amputation. No  complaints.    Medications:      Allergies:  Allergies    No Known Allergies    Intolerances        Vital Signs Last 24 Hrs  T(C): 36.7 (28 Mar 2019 04:45), Max: 37.7 (27 Mar 2019 20:12)  T(F): 98.1 (28 Mar 2019 04:45), Max: 99.8 (27 Mar 2019 20:12)  HR: 107 (28 Mar 2019 04:45) (107 - 117)  BP: 107/77 (28 Mar 2019 04:45) (104/74 - 107/77)  BP(mean): --  RR: 18 (28 Mar 2019 04:45) (18 - 18)  SpO2: 96% (28 Mar 2019 04:45) (94% - 96%)  I&O's Summary    27 Mar 2019 07:01  -  28 Mar 2019 07:00  --------------------------------------------------------  IN: 640 mL / OUT: 0 mL / NET: 640 mL        Physical Exam:  Gen: NAD, A&Ox3  CV: No incr WOB  Left AKA wound is healing well. No infections or hematomas. LENIN drain removed this am. Right foot without decubiti.  Pulses:++ PULSES IN RIGHT FOOT AND LEFT FEMORAL ARTERIES.    LABS:                        9.3    8.29  )-----------( 438      ( 28 Mar 2019 08:17 )             29.5     03-27    138  |  102  |  9   ----------------------------<  111<H>  3.7   |  27  |  0.76    Ca    8.3<L>      27 Mar 2019 08:41    TPro  7.3  /  Alb  1.8<L>  /  TBili  0.4  /  DBili  x   /  AST  49<H>  /  ALT  28  /  AlkPhos  169<H>  03-27    PT/INR - ( 27 Mar 2019 08:41 )   PT: 17.1 sec;   INR: 1.48 ratio
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
## 2019-03-28 NOTE — PROGRESS NOTE ADULT - ATTENDING COMMENTS
Stable s/p left AKA amputation ,OOB TC /  PT therapy. Restart Xarelto today and plavix tomorrow  ,case d/w Dr Stanley .Stop abx as per ID recommendation DRAINAGE REMOVED BY VASC SX ,may be transferred back to [facility]

## 2019-03-28 NOTE — PROGRESS NOTE ADULT - SUBJECTIVE AND OBJECTIVE BOX
Patient is a 63y Male with a known history of :  Anemia, chronic disease (D63.8)  ASHD (arteriosclerotic heart disease) (I25.10)  Hyperlipidemia (E78.5)  DVT (deep venous thrombosis) (I82.409)  Thrombophlebitis of popliteal vein (781719804)  Prophylactic measure (Z29.9)  Immobility (Z74.09)  Malnutrition (E46)  Hypertension (I10)  PVD (peripheral vascular disease) (I73.9)  Gangrene (I96)    HPI:  64 yo male with H/O DVT (deep venous thrombosis ,nearly occlusive popliteal )on Xarelto   ,Hyperlipidemia  ,Hypertension and PVD (peripheral vascular disease) ,PAD ,LLE popliteal bypass ,s/p left 5th toe amputation ,CVA in 1987 with L hemiparesis and contractures ,hx of CAD , Quadriple bypass , left foot toe avulsion .  Patient  was sent  today from [facility]  SNF for evaluation of worsening necrotic left foot now foul smelling with gangrenous changes ,followed b y wound care team in facility and vascular sx evaluation was recommended . Seen by Dr Stanley in the office 2 days ago and scheduled for elective amputation tomorrow . XARELTO and PLAVIX are on hold since 03/22 No fever, chills, SOB and or pain in foot Patient was recently hospitalized in [facility] with LLE bleeding ( after he pulled the skin from left foot ) Admitted for septic workup and evaluation,send blood and urine cx,serial lactate levels,monitor vitals closley,ivfs hydration,monitor urine output and renal profile,iv abx initiated Palliative care consult requested ,to discuss advance directives and complete MOLST (24 Mar 2019 11:48)      REVIEW OF SYSTEMS:    CONSTITUTIONAL: No fever, weight loss, or fatigue  EYES: No eye pain, visual disturbances, or discharge  ENMT:  No difficulty hearing, tinnitus, vertigo; No sinus or throat pain  NECK: No pain or stiffness  BREASTS: No pain, masses, or nipple discharge  RESPIRATORY: No cough, wheezing, chills or hemoptysis; No shortness of breath  CARDIOVASCULAR: No chest pain, palpitations, dizziness, or leg swelling  GASTROINTESTINAL: No abdominal or epigastric pain. No nausea, vomiting, or hematemesis; No diarrhea or constipation. No melena or hematochezia.  GENITOURINARY: No dysuria, frequency, hematuria, or incontinence  NEUROLOGICAL: No headaches, memory loss, loss of strength, numbness, or tremors  SKIN: No itching, burning, rashes, or lesions   LYMPH NODES: No enlarged glands  ENDOCRINE: No heat or cold intolerance; No hair loss  MUSCULOSKELETAL: No joint pain or swelling; No muscle, back, or extremity pain  PSYCHIATRIC: No depression, anxiety, mood swings, or difficulty sleeping  HEME/LYMPH: No easy bruising, or bleeding gums  ALLERGY AND IMMUNOLOGIC: No hives or eczema    MEDICATIONS  (STANDING):  amLODIPine   Tablet 5 milliGRAM(s) Oral daily  atorvastatin 10 milliGRAM(s) Oral at bedtime  clopidogrel Tablet 75 milliGRAM(s) Oral daily  dextrose 5% + sodium chloride 0.45%. 1000 milliLiter(s) (50 mL/Hr) IV Continuous <Continuous>  famotidine    Tablet 20 milliGRAM(s) Oral daily  lactobacillus acidophilus 1 Tablet(s) Oral every 8 hours  lisinopril 20 milliGRAM(s) Oral daily  rivaroxaban 20 milliGRAM(s) Oral every 24 hours    MEDICATIONS  (PRN):  acetaminophen   Tablet .. 650 milliGRAM(s) Oral every 6 hours PRN Temp greater or equal to 38C (100.4F), Mild Pain (1 - 3)  HYDROmorphone  Injectable 1 milliGRAM(s) IV Push every 4 hours PRN Severe Pain (7 - 10)  HYDROmorphone  Injectable 0.5 milliGRAM(s) IV Push every 3 hours PRN Moderate Pain (4 - 6)      ALLERGIES: No Known Allergies      FAMILY HISTORY:  Family history of hypertension (Father, Mother)      PHYSICAL EXAMINATION:  -----------------------------  T(C): 36.7 (03-28-19 @ 04:45), Max: 37.7 (03-27-19 @ 14:08)  HR: 107 (03-28-19 @ 04:45) (107 - 117)  BP: 107/77 (03-28-19 @ 04:45) (104/74 - 107/77)  RR: 18 (03-28-19 @ 04:45) (17 - 18)  SpO2: 96% (03-28-19 @ 04:45) (92% - 96%)  Wt(kg): --    03-27 @ 07:01  -  03-28 @ 07:00  --------------------------------------------------------  IN:    dextrose 5% + sodium chloride 0.45%.: 250 mL    Oral Fluid: 390 mL  Total IN: 640 mL    OUT:  Total OUT: 0 mL    Total NET: 640 mL            Constitutional: well developed, normal appearance, well groomed, well nourished, no deformities and no acute distress.   Eyes: the conjunctiva exhibited no abnormalities and the eyelids demonstrated no xanthelasmas.   HEENT: normal oral mucosa, no oral pallor and no oral cyanosis.   Neck: normal jugular venous A waves present, normal jugular venous V waves present and no jugular venous cabrera A waves.   Pulmonary: no respiratory distress, normal respiratory rhythm and effort, no accessory muscle use and lungs were clear to auscultation bilaterally.   Cardiovascular: heart rate and rhythm were normal, normal S1 and S2 and no murmur, gallop, rub, heave or thrill are present.   Abdomen: soft, non-tender, no hepato-splenomegaly and no abdominal mass palpated.   Musculoskeletal: the gait could not be assessed..   Extremities: no clubbing of the fingernails, no localized cyanosis, no petechial hemorrhages and no ischemic changes.   Skin: normal skin color and pigmentation, no rash, no venous stasis, no skin lesions, no skin ulcer and no xanthoma was observed.   Psychiatric: oriented to person, place, and time, the affect was normal, the mood was normal and not feeling anxious.     LABS:   --------  03-27    138  |  102  |  9   ----------------------------<  111<H>  3.7   |  27  |  0.76    Ca    8.3<L>      27 Mar 2019 08:41    TPro  7.3  /  Alb  1.8<L>  /  TBili  0.4  /  DBili  x   /  AST  49<H>  /  ALT  28  /  AlkPhos  169<H>  03-27                         9.3    8.29  )-----------( 438      ( 28 Mar 2019 08:17 )             29.5     PT/INR - ( 27 Mar 2019 08:41 )   PT: 17.1 sec;   INR: 1.48 ratio                 Culture Results:   No growth (03-27 @ 01:30)      RADIOLOGY:  -----------------        ECG:

## 2019-03-28 NOTE — PROGRESS NOTE ADULT - SUBJECTIVE AND OBJECTIVE BOX
REARDONARELY MEDINA  63y  MRN-[id number]    VASCULAR [facility]/ DR. EUGENE    POD # 3    Vital Signs Last 24 Hrs  T(C): 36.7 (28 Mar 2019 04:45), Max: 37.7 (27 Mar 2019 14:08)  T(F): 98.1 (28 Mar 2019 04:45), Max: 99.9 (27 Mar 2019 14:08)  HR: 107 (28 Mar 2019 04:45) (107 - 117)  BP: 107/77 (28 Mar 2019 04:45) (104/74 - 107/77)  BP(mean): --  RR: 18 (28 Mar 2019 04:45) (17 - 18)  SpO2: 96% (28 Mar 2019 04:45) (92% - 96%)     PHYSICAL EXAM    LEFT AKA STUMP    WOUND DRY AND INTACT, NO BLEEDING/ PUS/ DRAINAGE   SOME EDEMA, NO ERYTHEMA , STAPLES AND  SUTURES IN PLACE WITH GOOD APPROXIMATION   LENIN DRAIN IN PLACE                         8.7    11.17 )-----------( 441      ( 27 Mar 2019 08:41 )             27.5     03-27    138  |  102  |  9   ----------------------------<  111<H>  3.7   |  27  |  0.76    Ca    8.3<L>      27 Mar 2019 08:41    TPro  7.3  /  Alb  1.8<L>  /  TBili  0.4  /  DBili  x   /  AST  49<H>  /  ALT  28  /  AlkPhos  169<H>  03-27    ASSESSMENT AND PLAN : POD # 3 S/P LEFT AKA     DRAIN REMOVED  NEW DRESSING APPLIED   CLEAR FOR DISCHARGE FROM VASCULAR SURGERY STAND POINT  SUTURES AND STAPLES WILL REMAIN IN PLACE FOR AT LEAST 3 WEEKS  SHOULD CONSULT DR. EUGENE BEFORE REMOVING ANY SUTURES OR STAPLES

## 2019-03-28 NOTE — PROGRESS NOTE ADULT - PROBLEM SELECTOR PLAN 7
Palliative care consult requested ,to discuss advance directives and complete MOLST

## 2019-03-28 NOTE — PROGRESS NOTE ADULT - REASON FOR ADMISSION
LLE GANGRENE
LEFT FOOT GANGRENE
LLE GANGRENE

## 2019-03-28 NOTE — PROGRESS NOTE ADULT - NSHPATTENDINGPLANDISCUSS_GEN_ALL_CORE
MED STAFF , DR. EUGENE ,  ,FAMILY
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
## 2019-03-28 NOTE — DISCHARGE NOTE NURSING/CASE MANAGEMENT/SOCIAL WORK - NSDCDPATPORTLINK_GEN_ALL_CORE
You can access the [facility], offered by [facility], by registering with the following website: [email or web address]

## 2019-03-28 NOTE — PROGRESS NOTE ADULT - PROBLEM SELECTOR PLAN 8
Gastrointestinal stress ulcer prophylaxis and DVT prophylaxis administrated

## 2019-03-28 NOTE — PROGRESS NOTE ADULT - PROBLEM SELECTOR PLAN 1
Admitted for septic workup and evaluation,send blood and urine cx,serial lactate levels,monitor vitals closley,ivfs hydration,monitor urine output and renal profile,iv abx initiated
Stable s/p left AKA amputation ,OOB TC /  PT therapy. No heparin or Lovenox today. Restart Xarelto tomorrow ,case d/w Dr Stanley .Stop abx in am as per ID recommendation
pt will have abk amputation
Stable s/p left AKA amputation ,OOB TC /  PT therapy. No heparin or Lovenox today. Restart Xarelto tomorrow ,case d/w Dr Stanley .Stop abx in am as per ID recommendation
Stable s/p left AKA amputation ,OOB TC /  PT therapy. No heparin or Lovenox today. Restart Xarelto tomorrow ,case d/w Dr Stanley .Stop abx in am as per ID recommendation

## 2019-03-28 NOTE — DISCHARGE NOTE PROVIDER - NSDCFUADDINST_GEN_ALL_CORE_FT
CLEAR FOR DISCHARGE FROM VASCULAR SURGERY STAND POINT  SUTURES AND STAPLES WILL REMAIN IN PLACE FOR AT LEAST 3 WEEKS  SHOULD CONSULT DR. EUGENE BEFORE REMOVING ANY SUTURES OR STAPLES

## 2019-03-28 NOTE — DISCHARGE NOTE NURSING/CASE MANAGEMENT/SOCIAL WORK - NSDCPEEMAIL_GEN_ALL_CORE
[facility] for Tobacco Control email [email or web address]

## 2019-03-29 LAB
CULTURE RESULTS: SIGNIFICANT CHANGE UP
CULTURE RESULTS: SIGNIFICANT CHANGE UP
SPECIMEN SOURCE: SIGNIFICANT CHANGE UP
SPECIMEN SOURCE: SIGNIFICANT CHANGE UP

## 2019-04-01 LAB — SURGICAL PATHOLOGY STUDY: SIGNIFICANT CHANGE UP

## 2019-05-23 ENCOUNTER — INPATIENT (INPATIENT)
Facility: HOSPITAL | Age: 64
LOS: 3 days | Discharge: NURSING, REHAB OR LONG-TERM CARE | DRG: 379 | End: 2019-05-27
Attending: INTERNAL MEDICINE | Admitting: INTERNAL MEDICINE
Payer: MEDICARE

## 2019-05-23 ENCOUNTER — TRANSCRIPTION ENCOUNTER (OUTPATIENT)
Age: 64
End: 2019-05-23

## 2019-05-23 VITALS — HEIGHT: 76 IN | WEIGHT: 199.96 LBS

## 2019-05-23 DIAGNOSIS — D64.9 ANEMIA, UNSPECIFIED: ICD-10-CM

## 2019-05-23 DIAGNOSIS — Z98.890 OTHER SPECIFIED POSTPROCEDURAL STATES: Chronic | ICD-10-CM

## 2019-05-23 DIAGNOSIS — K92.2 GASTROINTESTINAL HEMORRHAGE, UNSPECIFIED: ICD-10-CM

## 2019-05-23 DIAGNOSIS — I63.9 CEREBRAL INFARCTION, UNSPECIFIED: ICD-10-CM

## 2019-05-23 DIAGNOSIS — I82.409 ACUTE EMBOLISM AND THROMBOSIS OF UNSPECIFIED DEEP VEINS OF UNSPECIFIED LOWER EXTREMITY: ICD-10-CM

## 2019-05-23 DIAGNOSIS — Z95.1 PRESENCE OF AORTOCORONARY BYPASS GRAFT: Chronic | ICD-10-CM

## 2019-05-23 DIAGNOSIS — Z29.9 ENCOUNTER FOR PROPHYLACTIC MEASURES, UNSPECIFIED: ICD-10-CM

## 2019-05-23 DIAGNOSIS — Z89.512 ACQUIRED ABSENCE OF LEFT LEG BELOW KNEE: Chronic | ICD-10-CM

## 2019-05-23 DIAGNOSIS — I73.9 PERIPHERAL VASCULAR DISEASE, UNSPECIFIED: ICD-10-CM

## 2019-05-23 DIAGNOSIS — I10 ESSENTIAL (PRIMARY) HYPERTENSION: ICD-10-CM

## 2019-05-23 PROBLEM — E78.5 HYPERLIPIDEMIA, UNSPECIFIED: Chronic | Status: ACTIVE | Noted: 2019-03-24

## 2019-05-23 LAB
ALBUMIN SERPL ELPH-MCNC: 3.2 G/DL — LOW (ref 3.3–5)
ALP SERPL-CCNC: 92 U/L — SIGNIFICANT CHANGE UP (ref 40–120)
ALT FLD-CCNC: 11 U/L — LOW (ref 12–78)
ANION GAP SERPL CALC-SCNC: 8 MMOL/L — SIGNIFICANT CHANGE UP (ref 5–17)
ANISOCYTOSIS BLD QL: SLIGHT — SIGNIFICANT CHANGE UP
APPEARANCE UR: ABNORMAL
APTT BLD: 48 SEC — HIGH (ref 28.5–37)
AST SERPL-CCNC: 11 U/L — LOW (ref 15–37)
BACTERIA # UR AUTO: ABNORMAL
BASOPHILS # BLD AUTO: 0.06 K/UL — SIGNIFICANT CHANGE UP (ref 0–0.2)
BASOPHILS NFR BLD AUTO: 1 % — SIGNIFICANT CHANGE UP (ref 0–2)
BILIRUB SERPL-MCNC: 0.2 MG/DL — SIGNIFICANT CHANGE UP (ref 0.2–1.2)
BILIRUB UR-MCNC: ABNORMAL
BLD GP AB SCN SERPL QL: SIGNIFICANT CHANGE UP
BUN SERPL-MCNC: 42 MG/DL — HIGH (ref 7–23)
CALCIUM SERPL-MCNC: 8.4 MG/DL — LOW (ref 8.5–10.1)
CHLORIDE SERPL-SCNC: 109 MMOL/L — HIGH (ref 96–108)
CO2 SERPL-SCNC: 24 MMOL/L — SIGNIFICANT CHANGE UP (ref 22–31)
COLOR SPEC: YELLOW — SIGNIFICANT CHANGE UP
CREAT SERPL-MCNC: 0.92 MG/DL — SIGNIFICANT CHANGE UP (ref 0.5–1.3)
DIFF PNL FLD: ABNORMAL
EOSINOPHIL # BLD AUTO: 0.08 K/UL — SIGNIFICANT CHANGE UP (ref 0–0.5)
EOSINOPHIL NFR BLD AUTO: 1.3 % — SIGNIFICANT CHANGE UP (ref 0–6)
EPI CELLS # UR: SIGNIFICANT CHANGE UP
GLUCOSE SERPL-MCNC: 98 MG/DL — SIGNIFICANT CHANGE UP (ref 70–99)
GLUCOSE UR QL: NEGATIVE — SIGNIFICANT CHANGE UP
HCT VFR BLD CALC: 21.2 % — LOW (ref 39–50)
HCT VFR BLD CALC: 21.9 % — LOW (ref 39–50)
HGB BLD-MCNC: 7 G/DL — CRITICAL LOW (ref 13–17)
HGB BLD-MCNC: 7.1 G/DL — LOW (ref 13–17)
IMM GRANULOCYTES NFR BLD AUTO: 0.3 % — SIGNIFICANT CHANGE UP (ref 0–1.5)
INR BLD: 1.6 RATIO — HIGH (ref 0.88–1.16)
KETONES UR-MCNC: NEGATIVE — SIGNIFICANT CHANGE UP
LEUKOCYTE ESTERASE UR-ACNC: ABNORMAL
LG PLATELETS BLD QL AUTO: SLIGHT — SIGNIFICANT CHANGE UP
LIDOCAIN IGE QN: 147 U/L — SIGNIFICANT CHANGE UP (ref 73–393)
LYMPHOCYTES # BLD AUTO: 2.01 K/UL — SIGNIFICANT CHANGE UP (ref 1–3.3)
LYMPHOCYTES # BLD AUTO: 33.7 % — SIGNIFICANT CHANGE UP (ref 13–44)
MANUAL SMEAR VERIFICATION: SIGNIFICANT CHANGE UP
MCHC RBC-ENTMCNC: 28.5 PG — SIGNIFICANT CHANGE UP (ref 27–34)
MCHC RBC-ENTMCNC: 32 GM/DL — SIGNIFICANT CHANGE UP (ref 32–36)
MCV RBC AUTO: 89 FL — SIGNIFICANT CHANGE UP (ref 80–100)
MONOCYTES # BLD AUTO: 0.46 K/UL — SIGNIFICANT CHANGE UP (ref 0–0.9)
MONOCYTES NFR BLD AUTO: 7.7 % — SIGNIFICANT CHANGE UP (ref 2–14)
NEUTROPHILS # BLD AUTO: 3.34 K/UL — SIGNIFICANT CHANGE UP (ref 1.8–7.4)
NEUTROPHILS NFR BLD AUTO: 56 % — SIGNIFICANT CHANGE UP (ref 43–77)
NITRITE UR-MCNC: POSITIVE
NRBC # BLD: 0 /100 WBCS — SIGNIFICANT CHANGE UP (ref 0–0)
OB PNL STL: POSITIVE
PH UR: 5 — SIGNIFICANT CHANGE UP (ref 5–8)
PLAT MORPH BLD: NORMAL — SIGNIFICANT CHANGE UP
PLATELET # BLD AUTO: 329 K/UL — SIGNIFICANT CHANGE UP (ref 150–400)
POIKILOCYTOSIS BLD QL AUTO: SLIGHT — SIGNIFICANT CHANGE UP
POLYCHROMASIA BLD QL SMEAR: SLIGHT — SIGNIFICANT CHANGE UP
POTASSIUM SERPL-MCNC: 3.9 MMOL/L — SIGNIFICANT CHANGE UP (ref 3.5–5.3)
POTASSIUM SERPL-SCNC: 3.9 MMOL/L — SIGNIFICANT CHANGE UP (ref 3.5–5.3)
PROT SERPL-MCNC: 6.9 G/DL — SIGNIFICANT CHANGE UP (ref 6–8.3)
PROT UR-MCNC: 25 MG/DL
PROTHROM AB SERPL-ACNC: 18.5 SEC — HIGH (ref 10–12.9)
RBC # BLD: 2.46 M/UL — LOW (ref 4.2–5.8)
RBC # FLD: 14.3 % — SIGNIFICANT CHANGE UP (ref 10.3–14.5)
RBC BLD AUTO: ABNORMAL
RBC CASTS # UR COMP ASSIST: SIGNIFICANT CHANGE UP /HPF (ref 0–4)
SODIUM SERPL-SCNC: 141 MMOL/L — SIGNIFICANT CHANGE UP (ref 135–145)
SP GR SPEC: 1.02 — SIGNIFICANT CHANGE UP (ref 1.01–1.02)
TROPONIN I SERPL-MCNC: <.015 NG/ML — SIGNIFICANT CHANGE UP (ref 0.01–0.04)
UROBILINOGEN FLD QL: NEGATIVE — SIGNIFICANT CHANGE UP
WBC # BLD: 5.97 K/UL — SIGNIFICANT CHANGE UP (ref 3.8–10.5)
WBC # FLD AUTO: 5.97 K/UL — SIGNIFICANT CHANGE UP (ref 3.8–10.5)
WBC UR QL: >50

## 2019-05-23 PROCEDURE — 99285 EMERGENCY DEPT VISIT HI MDM: CPT

## 2019-05-23 PROCEDURE — 71045 X-RAY EXAM CHEST 1 VIEW: CPT | Mod: 26

## 2019-05-23 RX ORDER — LANOLIN ALCOHOL/MO/W.PET/CERES
3 CREAM (GRAM) TOPICAL AT BEDTIME
Refills: 0 | Status: COMPLETED | OUTPATIENT
Start: 2019-05-23 | End: 2019-05-23

## 2019-05-23 RX ORDER — SODIUM CHLORIDE 9 MG/ML
1000 INJECTION INTRAMUSCULAR; INTRAVENOUS; SUBCUTANEOUS
Refills: 0 | Status: DISCONTINUED | OUTPATIENT
Start: 2019-05-23 | End: 2019-05-27

## 2019-05-23 RX ORDER — NYSTATIN CREAM 100000 [USP'U]/G
1 CREAM TOPICAL EVERY 8 HOURS
Refills: 0 | Status: DISCONTINUED | OUTPATIENT
Start: 2019-05-23 | End: 2019-05-27

## 2019-05-23 RX ORDER — METOPROLOL TARTRATE 50 MG
25 TABLET ORAL
Refills: 0 | Status: DISCONTINUED | OUTPATIENT
Start: 2019-05-23 | End: 2019-05-27

## 2019-05-23 RX ORDER — ATORVASTATIN CALCIUM 80 MG/1
10 TABLET, FILM COATED ORAL AT BEDTIME
Refills: 0 | Status: DISCONTINUED | OUTPATIENT
Start: 2019-05-23 | End: 2019-05-27

## 2019-05-23 RX ORDER — PANTOPRAZOLE SODIUM 20 MG/1
40 TABLET, DELAYED RELEASE ORAL EVERY 12 HOURS
Refills: 0 | Status: DISCONTINUED | OUTPATIENT
Start: 2019-05-23 | End: 2019-05-24

## 2019-05-23 RX ORDER — SODIUM CHLORIDE 9 MG/ML
500 INJECTION INTRAMUSCULAR; INTRAVENOUS; SUBCUTANEOUS ONCE
Refills: 0 | Status: COMPLETED | OUTPATIENT
Start: 2019-05-23 | End: 2019-05-23

## 2019-05-23 RX ORDER — ACETAMINOPHEN 500 MG
650 TABLET ORAL EVERY 6 HOURS
Refills: 0 | Status: DISCONTINUED | OUTPATIENT
Start: 2019-05-23 | End: 2019-05-27

## 2019-05-23 RX ADMIN — Medication 25 MILLIGRAM(S): at 17:38

## 2019-05-23 RX ADMIN — Medication 3 MILLIGRAM(S): at 22:08

## 2019-05-23 RX ADMIN — SODIUM CHLORIDE 50 MILLILITER(S): 9 INJECTION INTRAMUSCULAR; INTRAVENOUS; SUBCUTANEOUS at 17:08

## 2019-05-23 RX ADMIN — NYSTATIN CREAM 1 APPLICATION(S): 100000 CREAM TOPICAL at 21:34

## 2019-05-23 RX ADMIN — PANTOPRAZOLE SODIUM 40 MILLIGRAM(S): 20 TABLET, DELAYED RELEASE ORAL at 16:15

## 2019-05-23 RX ADMIN — SODIUM CHLORIDE 500 MILLILITER(S): 9 INJECTION INTRAMUSCULAR; INTRAVENOUS; SUBCUTANEOUS at 12:09

## 2019-05-23 RX ADMIN — ATORVASTATIN CALCIUM 10 MILLIGRAM(S): 80 TABLET, FILM COATED ORAL at 21:34

## 2019-05-23 RX ADMIN — SODIUM CHLORIDE 50 MILLILITER(S): 9 INJECTION INTRAMUSCULAR; INTRAVENOUS; SUBCUTANEOUS at 16:15

## 2019-05-23 RX ADMIN — SODIUM CHLORIDE 500 MILLILITER(S): 9 INJECTION INTRAMUSCULAR; INTRAVENOUS; SUBCUTANEOUS at 13:09

## 2019-05-23 NOTE — H&P ADULT - HISTORY OF PRESENT ILLNESS
62 yo Male , [facility] resident with hx of H/O DVT (deep venous thrombosis ,nearly occlusive popliteal )on Xarelto   ,Hyperlipidemia  ,Hypertension and PVD (peripheral vascular disease) ,PAD ,LLE popliteal bypass ,s/p left 5th toe amputation ,CVA in 1987 with L hemiparesis and contractures ,hx of CAD , Quadriple bypass , left foot toe avulsion , s/p LLE gangrene and L AKA  .  p/w dark stools since this am ( as per nursing staff patient hasd a lot of melena in a diaper ) . Pt on xarelto, took last dose last night. Pt also took his plavix today. no weakness. no dizziness. no cp/sob/palp. no abd pain. no n/v. no recent fall / trauma. no agg/allev factors. No other inj or co Founfd to have anemia and blood transfusion of PRBCS is ordered ,seen by GI and EGD is recommended Palliative care consult requested ,to discuss advance directives and complete MOLST

## 2019-05-23 NOTE — H&P ADULT - ASSESSMENT
62 yo Male , [facility] resident with hx of H/O DVT (deep venous thrombosis ,nearly occlusive popliteal )on Xarelto   ,Hyperlipidemia  ,Hypertension and PVD (peripheral vascular disease) ,PAD ,LLE popliteal bypass ,s/p left 5th toe amputation ,CVA in 1987 with L hemiparesis and contractures ,hx of CAD , Quadriple bypass , left foot toe avulsion , s/p LLE gangrene and L AKA  .  p/w dark stools since this am ( as per nursing staff patient hasd a lot of melena in a diaper ) . Pt on xarelto, took last dose last night. Pt also took his plavix today. no weakness. no dizziness. no cp/sob/palp. no abd pain. no n/v. no recent fall / trauma. no agg/allev factors. No other inj or co Founfd to have anemia and blood transfusion of PRBCS is ordered ,seen by GI and EGD is recommended Palliative care consult requested ,to discuss advance directives and complete MOLST

## 2019-05-23 NOTE — H&P ADULT - RS GEN PE MLT RESP DETAILS PC
diminished breath sounds, L/breath sounds equal/diminished breath sounds, R/normal/airway patent/clear to auscultation bilaterally/good air movement/respirations non-labored
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
## 2019-05-23 NOTE — H&P ADULT - DOES THIS PATIENT HAVE A HISTORY OF OR HAS BEEN DX WITH HEART FAILURE?
Lisette with [facility] oral [facility] maxill[facility] surgery aware of Dr Santiago recommendations in regards with Lovenox. Lisette asked for documentation be faxed to their office as well.  Their fax number is [phone].    no

## 2019-05-23 NOTE — PATIENT PROFILE ADULT - FALL HARM RISK
coagulation(Bleeding disorder R/T clinical cond/anti-coags)/other/Pt with history of CVA with left sided weakness

## 2019-05-23 NOTE — CONSULT NOTE ADULT - ASSESSMENT
pt with rectal bleeding   hb7gm - to have blood transfusion   ashd-tachycardia  r/o ischemia  s/p cva  s/p dvt  hold xarleto  and plavix  add beta blocker and statin   troponin x3 r/o acute ischemia  will follow

## 2019-05-23 NOTE — ED PROVIDER NOTE - PROGRESS NOTE DETAILS
Dw Dr Oropeza, she has consulted Dr Nance who will see pt today Walter Oropeza, will see pt to admit

## 2019-05-23 NOTE — ED PROVIDER NOTE - CHPI ED SYMPTOMS NEG
no abdominal distension/no burning urination/no dysuria/no fever/no chills/no vomiting/no blood in stool/no diarrhea

## 2019-05-23 NOTE — ED ADULT NURSE NOTE - CHPI ED NUR SYMPTOMS NEG
no diarrhea/no vomiting/no burning urination/no hematuria/no dysuria/no abdominal distension/no chills/no fever/no nausea

## 2019-05-23 NOTE — H&P ADULT - ATTENDING COMMENTS
62 yo Male , [facility] resident with hx of H/O DVT (deep venous thrombosis ,nearly occlusive popliteal )on Xarelto   ,Hyperlipidemia  ,Hypertension and PVD (peripheral vascular disease) ,PAD ,LLE popliteal bypass ,s/p left 5th toe amputation ,CVA in 1987 with L hemiparesis and contractures ,hx of CAD , Quadriple bypass , left foot toe avulsion , s/p LLE gangrene and L AKA  .  p/w dark stools since this am ( as per nursing staff patient hasd a lot of melena in a diaper ) . Pt on xarelto, took last dose last night. Pt also took his plavix today. no weakness. no dizziness. no cp/sob/palp. no abd pain. no n/v. no recent fall / trauma. no agg/allev factors. No other inj or co Founfd to have anemia and blood transfusion of PRBCS is ordered ,seen by GI and EGD is recommended Palliative care consult requested ,to discuss advance directives and complete MOLST
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
## 2019-05-23 NOTE — H&P ADULT - NSICDXPASTSURGICALHX_GEN_ALL_CORE_FT
PAST SURGICAL HISTORY:  S/P BKA (below knee amputation), left     S/P CABG (coronary artery bypass graft)     S/P quadruple vessel bypass     S/P transsphenoidal selective adenomectomy

## 2019-05-23 NOTE — CONSULT NOTE ADULT - SUBJECTIVE AND OBJECTIVE BOX
Hematology/Oncology consult     Patient is seen and examined  notes/labs reviewed  pt family is at bedside and d/w family.  consult dictated,  Job #    contact #  son/daughter----  phone #    IMPRESSION:      RECOMMENDATION:              ,thanks for courtsey of this consult,will follow this pt with you for hem/onc issue while pt is in hospital. Hematology/Oncology consult     Patient is seen and examined  notes/labs reviewed  consult dictated,  Job # [id number]    contact # girlfriend  liliana whittaker----  phone # [phone],, called and spoke to girl friend  shx---quit smoking since around 2/2019, before 1 ppd  fmhx--2 boys--ages 26/24  pt is at [facility] since around 2/2019  pt has been on anticoagulation long standing as per pt/pt wife      IMPRESSION:  anemia  gi blood loss--is getting prbc transfusion  multiple co morbid medical conditions  s/p left aka---3/25/2019  hx of repeated cva per pt/girlfriend--1986/1987 and then around 2016 with worsening left sided weakness as per girlfriend  pad/pvd/hx repeated stroke and had been on anticoagulation as per pt/girl friend  hx dvt in lle 2/2019 with no dvt in rle, s/p left leg aka--3/2019    RECOMMENDATION:  getting blood already  anemia work up  monitor h/h----transfuse prbc as needed for symptomatic anemia   follow gi and gi work up/egd per gi  lupus ac/cardiolipin ab/anemia w/u, rle doppler    Dr julio ,thanks for courtesy of this consult, will follow this pt with you for hem/onc issue while pt is in hospital. Hematology/Oncology consult     Patient is seen and examined  notes/labs reviewed  consult dictated,  Job # [id number]    contact # girlfriend  liliana whittaker----  phone # [phone],, called and spoke to girl friend  shx---quit smoking since around 2/2019, before 1 ppd  fmhx--2 boys--ages 26/24  pt is at [facility] since around 2/2019  pt has been on anticoagulation long standing as per pt/pt wife      IMPRESSION:  anemia  gi blood loss--is getting prbc transfusion  dark stool, no obvious bleeding, denies any complaints  multiple co morbid medical conditions  s/p left aka---3/25/2019  hx of repeated cva per pt/girlfriend--1986/1987 and then around 2016 with worsening left sided weakness as per girlfriend  pad/pvd/hx repeated stroke and had been on anticoagulation as per pt/girl friend  hx dvt in lle 2/2019 with no dvt in rle, s/p left leg aka--3/2019    RECOMMENDATION:  getting blood already  anemia work up  monitor h/h----transfuse prbc as needed for symptomatic anemia   follow gi and gi work up/egd per gi  lupus ac/cardiolipin ab/anemia w/u, rle doppler    Dr julio ,thanks for courtesy of this consult, will follow this pt with you for hem/onc issue while pt is in hospital.

## 2019-05-23 NOTE — H&P ADULT - NSHPATTENDINGPLANDISCUSS_GEN_ALL_CORE
med staff , Dr Bustillos , Dr Cam , [facility] med staff ,family ,[facility] RN

## 2019-05-23 NOTE — ED ADULT NURSE NOTE - NSIMPLEMENTINTERV_GEN_ALL_ED
Implemented All Fall Risk Interventions:  [place] to call system. Call bell, personal items and telephone within reach. Instruct patient to call for assistance. Room bathroom lighting operational. Non-slip footwear when patient is off stretcher. Physically safe environment: no spills, clutter or unnecessary equipment. Stretcher in lowest position, wheels locked, appropriate side rails in place. Provide visual cue, wrist band, yellow gown, etc. Monitor gait and stability. Monitor for mental status changes and reorient to person, place, and time. Review medications for side effects contributing to fall risk. Reinforce activity limits and safety measures with patient and family.

## 2019-05-23 NOTE — H&P ADULT - NEGATIVE GASTROINTESTINAL SYMPTOMS
no diarrhea/no vomiting/no nausea/no flatulence/no constipation/no change in bowel habits/no abdominal pain

## 2019-05-23 NOTE — H&P ADULT - NSHPLABSRESULTS_GEN_ALL_CORE
< from: Xray Chest 1 View AP/PA (05.23.19 @ 12:08) >    EXAM:  XR CHEST AP OR PA 1V                            PROCEDURE DATE:  05/23/2019          INTERPRETATION:  GI bleed.    PA lateral. Prior 3/24/2019.  Normal heart size. Unfolding calcification thoracic aorta. Hyperinflated   lungs. No focal infiltrates. Blunted left costophrenic angle may   represent pleural scarring and/or small left pleural effusion.    Impression: As above      < end of copied text >
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
## 2019-05-23 NOTE — ED PROVIDER NOTE - CARE PLAN
Principal Discharge DX:	Anemia, unspecified type  Secondary Diagnosis:	Gastrointestinal hemorrhage, unspecified gastrointestinal hemorrhage type
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
## 2019-05-23 NOTE — ED PROVIDER NOTE - OBJECTIVE STATEMENT
62 yo M p/w dark stools since this am. Pt on xarelto, took last dose last night. Pt also took his plavix today. no weakness. no dizziness. no cp/sob/palp. no abd pain. no n/v. no recent fall / trauma. no agg/allev factors. No other inj or co.

## 2019-05-23 NOTE — ED ADULT NURSE NOTE - PSH
S/P BKA (below knee amputation), left    S/P CABG (coronary artery bypass graft)    S/P quadruple vessel bypass    S/P transsphenoidal selective adenomectomy

## 2019-05-24 ENCOUNTER — RESULT REVIEW (OUTPATIENT)
Age: 64
End: 2019-05-24

## 2019-05-24 DIAGNOSIS — I25.10 ATHEROSCLEROTIC HEART DISEASE OF NATIVE CORONARY ARTERY WITHOUT ANGINA PECTORIS: ICD-10-CM

## 2019-05-24 DIAGNOSIS — E78.5 HYPERLIPIDEMIA, UNSPECIFIED: ICD-10-CM

## 2019-05-24 DIAGNOSIS — I49.3 VENTRICULAR PREMATURE DEPOLARIZATION: ICD-10-CM

## 2019-05-24 LAB
ANION GAP SERPL CALC-SCNC: 10 MMOL/L — SIGNIFICANT CHANGE UP (ref 5–17)
BUN SERPL-MCNC: 39 MG/DL — HIGH (ref 7–23)
CALCIUM SERPL-MCNC: 8 MG/DL — LOW (ref 8.5–10.1)
CHLORIDE SERPL-SCNC: 111 MMOL/L — HIGH (ref 96–108)
CO2 SERPL-SCNC: 24 MMOL/L — SIGNIFICANT CHANGE UP (ref 22–31)
CREAT SERPL-MCNC: 0.81 MG/DL — SIGNIFICANT CHANGE UP (ref 0.5–1.3)
DRVVT 50/50: 45.6 SEC — SIGNIFICANT CHANGE UP
DRVVT SCREEN TO CONFIRM RATIO: SIGNIFICANT CHANGE UP
FERRITIN SERPL-MCNC: 78 NG/ML — SIGNIFICANT CHANGE UP (ref 30–400)
FOLATE SERPL-MCNC: 10.6 NG/ML — SIGNIFICANT CHANGE UP
GLUCOSE SERPL-MCNC: 88 MG/DL — SIGNIFICANT CHANGE UP (ref 70–99)
HAPTOGLOB SERPL-MCNC: 210 MG/DL — HIGH (ref 34–200)
HCT VFR BLD CALC: 20.8 % — CRITICAL LOW (ref 39–50)
HGB BLD-MCNC: 6.8 G/DL — CRITICAL LOW (ref 13–17)
IRON SATN MFR SERPL: 17 % — SIGNIFICANT CHANGE UP (ref 16–55)
IRON SATN MFR SERPL: 31 UG/DL — LOW (ref 45–165)
LA NT DPL PPP QL: 55.4 SEC — SIGNIFICANT CHANGE UP
MCHC RBC-ENTMCNC: 28.8 PG — SIGNIFICANT CHANGE UP (ref 27–34)
MCHC RBC-ENTMCNC: 32.7 GM/DL — SIGNIFICANT CHANGE UP (ref 32–36)
MCV RBC AUTO: 88.1 FL — SIGNIFICANT CHANGE UP (ref 80–100)
NORMALIZED SCT PPP-RTO: 0.98 RATIO — SIGNIFICANT CHANGE UP (ref 0–1.16)
NORMALIZED SCT PPP-RTO: SIGNIFICANT CHANGE UP
NRBC # BLD: 0 /100 WBCS — SIGNIFICANT CHANGE UP (ref 0–0)
PLATELET # BLD AUTO: 246 K/UL — SIGNIFICANT CHANGE UP (ref 150–400)
POTASSIUM SERPL-MCNC: 4.2 MMOL/L — SIGNIFICANT CHANGE UP (ref 3.5–5.3)
POTASSIUM SERPL-SCNC: 4.2 MMOL/L — SIGNIFICANT CHANGE UP (ref 3.5–5.3)
RBC # BLD: 2.36 M/UL — LOW (ref 4.2–5.8)
RBC # BLD: 2.36 M/UL — LOW (ref 4.2–5.8)
RBC # FLD: 14.6 % — HIGH (ref 10.3–14.5)
RETICS #: 77.9 K/UL — SIGNIFICANT CHANGE UP (ref 25–125)
RETICS/RBC NFR: 3.3 % — HIGH (ref 0.5–2.5)
SODIUM SERPL-SCNC: 145 MMOL/L — SIGNIFICANT CHANGE UP (ref 135–145)
TIBC SERPL-MCNC: 182 UG/DL — LOW (ref 220–430)
TROPONIN I SERPL-MCNC: <.015 NG/ML — SIGNIFICANT CHANGE UP (ref 0.01–0.04)
TROPONIN I SERPL-MCNC: <.015 NG/ML — SIGNIFICANT CHANGE UP (ref 0.01–0.04)
UIBC SERPL-MCNC: 151 UG/DL — SIGNIFICANT CHANGE UP (ref 110–370)
VIT B12 SERPL-MCNC: 466 PG/ML — SIGNIFICANT CHANGE UP (ref 232–1245)
WBC # BLD: 5.55 K/UL — SIGNIFICANT CHANGE UP (ref 3.8–10.5)
WBC # FLD AUTO: 5.55 K/UL — SIGNIFICANT CHANGE UP (ref 3.8–10.5)

## 2019-05-24 PROCEDURE — 93010 ELECTROCARDIOGRAM REPORT: CPT

## 2019-05-24 PROCEDURE — 93971 EXTREMITY STUDY: CPT | Mod: 26,RT

## 2019-05-24 RX ORDER — LANOLIN ALCOHOL/MO/W.PET/CERES
3 CREAM (GRAM) TOPICAL AT BEDTIME
Refills: 0 | Status: COMPLETED | OUTPATIENT
Start: 2019-05-24 | End: 2019-05-24

## 2019-05-24 RX ORDER — PANTOPRAZOLE SODIUM 20 MG/1
40 TABLET, DELAYED RELEASE ORAL
Refills: 0 | Status: DISCONTINUED | OUTPATIENT
Start: 2019-05-24 | End: 2019-05-27

## 2019-05-24 RX ORDER — DIPHENHYDRAMINE HCL 50 MG
25 CAPSULE ORAL ONCE
Refills: 0 | Status: COMPLETED | OUTPATIENT
Start: 2019-05-24 | End: 2019-05-24

## 2019-05-24 RX ORDER — ACETAMINOPHEN 500 MG
650 TABLET ORAL ONCE
Refills: 0 | Status: COMPLETED | OUTPATIENT
Start: 2019-05-24 | End: 2019-05-24

## 2019-05-24 RX ORDER — SUCRALFATE 1 G
1 TABLET ORAL
Refills: 0 | Status: DISCONTINUED | OUTPATIENT
Start: 2019-05-24 | End: 2019-05-27

## 2019-05-24 RX ADMIN — ATORVASTATIN CALCIUM 10 MILLIGRAM(S): 80 TABLET, FILM COATED ORAL at 22:05

## 2019-05-24 RX ADMIN — SODIUM CHLORIDE 50 MILLILITER(S): 9 INJECTION INTRAMUSCULAR; INTRAVENOUS; SUBCUTANEOUS at 23:09

## 2019-05-24 RX ADMIN — Medication 1 GRAM(S): at 18:48

## 2019-05-24 RX ADMIN — NYSTATIN CREAM 1 APPLICATION(S): 100000 CREAM TOPICAL at 22:08

## 2019-05-24 RX ADMIN — Medication 25 MILLIGRAM(S): at 07:03

## 2019-05-24 RX ADMIN — Medication 650 MILLIGRAM(S): at 07:03

## 2019-05-24 RX ADMIN — NYSTATIN CREAM 1 APPLICATION(S): 100000 CREAM TOPICAL at 18:48

## 2019-05-24 RX ADMIN — PANTOPRAZOLE SODIUM 40 MILLIGRAM(S): 20 TABLET, DELAYED RELEASE ORAL at 06:25

## 2019-05-24 RX ADMIN — NYSTATIN CREAM 1 APPLICATION(S): 100000 CREAM TOPICAL at 05:25

## 2019-05-24 RX ADMIN — Medication 3 MILLIGRAM(S): at 22:05

## 2019-05-24 NOTE — PROGRESS NOTE ADULT - SUBJECTIVE AND OBJECTIVE BOX
Patient is a 63y old  Male who presents with a chief complaint of lower GIB (24 May 2019 08:38)       Pt is seen and examined  pt is awake and lying in bed/out of bed to chair  pt seems comfortable and denies any complaints at this time    HPI:  64 yo Male , [facility] resident with hx of H/O DVT (deep venous thrombosis ,nearly occlusive popliteal )on Xarelto   ,Hyperlipidemia  ,Hypertension and PVD (peripheral vascular disease) ,PAD ,LLE popliteal bypass ,s/p left 5th toe amputation ,CVA in 1987 with L hemiparesis and contractures ,hx of CAD , Quadriple bypass , left foot toe avulsion , s/p LLE gangrene and L AKA  .  p/w dark stools since this am ( as per nursing staff patient hasd a lot of melena in a diaper ) . Pt on xarelto, took last dose last night. Pt also took his plavix today. no weakness. no dizziness. no cp/sob/palp. no abd pain. no n/v. no recent fall / trauma. no agg/allev factors. No other inj or co Founfd to have anemia and blood transfusion of PRBCS is ordered ,seen by GI and EGD is recommended Palliative care consult requested ,to discuss advance directives and complete MOLST (23 May 2019 14:01)         ROS:  Negative except for:    MEDICATIONS  (STANDING):  atorvastatin 10 milliGRAM(s) Oral at bedtime  metoprolol tartrate 25 milliGRAM(s) Oral two times a day  nystatin Powder 1 Application(s) Topical every 8 hours  pantoprazole  Injectable 40 milliGRAM(s) IV Push every 12 hours  sodium chloride 0.9%. 1000 milliLiter(s) (50 mL/Hr) IV Continuous <Continuous>    MEDICATIONS  (PRN):  acetaminophen   Tablet .. 650 milliGRAM(s) Oral every 6 hours PRN Temp greater or equal to 38C (100.4F), Mild Pain (1 - 3)      Allergies    No Known Allergies    Intolerances        Vital Signs Last 24 Hrs  T(C): 37 (23 May 2019 23:29), Max: 37.6 (23 May 2019 16:36)  T(F): 98.6 (23 May 2019 23:29), Max: 99.6 (23 May 2019 16:36)  HR: 80 (24 May 2019 06:16) (71 - 110)  BP: 96/62 (24 May 2019 06:16) (95/63 - 118/74)  BP(mean): --  RR: 16 (23 May 2019 23:29) (16 - 18)  SpO2: 99% (23 May 2019 23:29) (99% - 100%)    PHYSICAL EXAM  General: adult in NAD  HEENT: clear oropharynx, anicteric sclera, pink conjunctiva  Neck: supple  CV: normal S1/S2 with no murmur rubs or gallops  Lungs: positive air movement b/l ant lungs,clear to auscultation, no wheezes, no rales  Abdomen: soft non-tender non-distended, no hepatosplenomegaly  Ext: no clubbing cyanosis or edema  Skin: no rashes and no petechiae  Neuro: alert and oriented X 4, no focal deficits  LABS:                          6.8    5.55  )-----------( 246      ( 24 May 2019 05:08 )             20.8         Mean Cell Volume : 88.1 fl  Mean Cell Hemoglobin : 28.8 pg  Mean Cell Hemoglobin Concentration : 32.7 gm/dL  Auto Neutrophil # : x  Auto Lymphocyte # : x  Auto Monocyte # : x  Auto Eosinophil # : x  Auto Basophil # : x  Auto Neutrophil % : x  Auto Lymphocyte % : x  Auto Monocyte % : x  Auto Eosinophil % : x  Auto Basophil % : x    Serial CBC's  05-24 @ 05:08  Hct-20.8 / Hgb-6.8 / Plat-246 / RBC-2.36 / WBC-5.55          Serial CBC's  05-23 @ 18:43  Hct-21.2 / Hgb-7.1 / Plat--- / RBC--- / WBC---          Serial CBC's  05-23 @ 12:02  Hct-21.9 / Hgb-7.0 / Plat-329 / RBC-2.46 / WBC-5.97            05-24    145  |  111<H>  |  39<H>  ----------------------------<  88  4.2   |  24  |  0.81    Ca    8.0<L>      24 May 2019 05:08    TPro  6.9  /  Alb  3.2<L>  /  TBili  0.2  /  DBili  x   /  AST  11<L>  /  ALT  11<L>  /  AlkPhos  92  05-23      PT/INR - ( 23 May 2019 12:02 )   PT: 18.5 sec;   INR: 1.60 ratio         PTT - ( 23 May 2019 12:02 )  PTT:48.0 sec    Iron - Total Binding Capacity.: 182 ug/dL (05-24-19 @ 09:42)  Ferritin, Serum: 78 ng/mL (05-24-19 @ 09:42)  Vitamin B12, Serum: 466 pg/mL (05-24-19 @ 09:42)  Folate, Serum: 10.6 ng/mL (05-24-19 @ 09:42)  Reticulocyte Percent: 3.3 % (05-24-19 @ 05:08)                BLOOD SMEAR INTERPRETATION:       RADIOLOGY & ADDITIONAL STUDIES: Patient is a 63y old  Male who presents with a chief complaint of lower GIB (24 May 2019 08:38)       Pt is seen and examined  pt is awake and lying in bed  pt seems comfortable and denies any complaints at this time  pt is getting prbc transfusion    HPI:  62 yo Male , [facility] resident with hx of H/O DVT (deep venous thrombosis ,nearly occlusive popliteal )on Xarelto   ,Hyperlipidemia  ,Hypertension and PVD (peripheral vascular disease) ,PAD ,LLE popliteal bypass ,s/p left 5th toe amputation ,CVA in 1987 with L hemiparesis and contractures ,hx of CAD , Quadriple bypass , left foot toe avulsion , s/p LLE gangrene and L AKA  .  p/w dark stools since this am ( as per nursing staff patient hasd a lot of melena in a diaper ) . Pt on xarelto, took last dose last night. Pt also took his plavix today. no weakness. no dizziness. no cp/sob/palp. no abd pain. no n/v. no recent fall / trauma. no agg/allev factors. No other inj or co Founfd to have anemia and blood transfusion of PRBCS is ordered ,seen by GI and EGD is recommended Palliative care consult requested ,to discuss advance directives and complete MOLST (23 May 2019 14:01)         MEDICATIONS  (STANDING):  atorvastatin 10 milliGRAM(s) Oral at bedtime  metoprolol tartrate 25 milliGRAM(s) Oral two times a day  nystatin Powder 1 Application(s) Topical every 8 hours  pantoprazole  Injectable 40 milliGRAM(s) IV Push every 12 hours  sodium chloride 0.9%. 1000 milliLiter(s) (50 mL/Hr) IV Continuous <Continuous>    MEDICATIONS  (PRN):  acetaminophen   Tablet .. 650 milliGRAM(s) Oral every 6 hours PRN Temp greater or equal to 38C (100.4F), Mild Pain (1 - 3)      Allergies    No Known Allergies    Intolerances        Vital Signs Last 24 Hrs  T(C): 37 (23 May 2019 23:29), Max: 37.6 (23 May 2019 16:36)  T(F): 98.6 (23 May 2019 23:29), Max: 99.6 (23 May 2019 16:36)  HR: 80 (24 May 2019 06:16) (71 - 110)  BP: 96/62 (24 May 2019 06:16) (95/63 - 118/74)  BP(mean): --  RR: 16 (23 May 2019 23:29) (16 - 18)  SpO2: 99% (23 May 2019 23:29) (99% - 100%)    PHYSICAL EXAM  General: adult in NAD  HEENT: clear oropharynx, anicteric sclera, pink conjunctiva  Neck: supple  CV: normal S1/S2 with no murmur rubs or gallops  Lungs: positive air movement b/l ant lungs,clear to auscultation, no wheezes, no rales  Abdomen: soft non-tender non-distended, no hepatosplenomegaly  Ext: no clubbing cyanosis or edema  Skin: no rashes and no petechiae  Neuro: alert and oriented X 4, no focal deficits  LABS:                          6.8    5.55  )-----------( 246      ( 24 May 2019 05:08 )             20.8         Mean Cell Volume : 88.1 fl  Mean Cell Hemoglobin : 28.8 pg  Mean Cell Hemoglobin Concentration : 32.7 gm/dL  Auto Neutrophil # : x  Auto Lymphocyte # : x  Auto Monocyte # : x  Auto Eosinophil # : x  Auto Basophil # : x  Auto Neutrophil % : x  Auto Lymphocyte % : x  Auto Monocyte % : x  Auto Eosinophil % : x  Auto Basophil % : x    Serial CBC's  05-24 @ 05:08  Hct-20.8 / Hgb-6.8 / Plat-246 / RBC-2.36 / WBC-5.55          Serial CBC's  05-23 @ 18:43  Hct-21.2 / Hgb-7.1 / Plat--- / RBC--- / WBC---          Serial CBC's  05-23 @ 12:02  Hct-21.9 / Hgb-7.0 / Plat-329 / RBC-2.46 / WBC-5.97            05-24    145  |  111<H>  |  39<H>  ----------------------------<  88  4.2   |  24  |  0.81    Ca    8.0<L>      24 May 2019 05:08    TPro  6.9  /  Alb  3.2<L>  /  TBili  0.2  /  DBili  x   /  AST  11<L>  /  ALT  11<L>  /  AlkPhos  92  05-23      PT/INR - ( 23 May 2019 12:02 )   PT: 18.5 sec;   INR: 1.60 ratio         PTT - ( 23 May 2019 12:02 )  PTT:48.0 sec    Iron - Total Binding Capacity.: 182 ug/dL (05-24-19 @ 09:42)  Ferritin, Serum: 78 ng/mL (05-24-19 @ 09:42)  Vitamin B12, Serum: 466 pg/mL (05-24-19 @ 09:42)  Folate, Serum: 10.6 ng/mL (05-24-19 @ 09:42)  Reticulocyte Percent: 3.3 % (05-24-19 @ 05:08)        EXAM:  US DPLX LWR EXT VEINS LTD RT                            PROCEDURE DATE:  05/24/2019          INTERPRETATION:  CLINICAL INFORMATION: Follow-up exam. History of left   leg DVT. Recurrent stroke.    COMPARISON: 3/24/2019    TECHNIQUE: Duplex sonography of the RIGHT LOWER extremity with color and   spectral Doppler, with and without compression.      FINDINGS:    There is normal compressibility of the right common femoral, femoral and   popliteal veins.     The contralateral common femoral vein is patent.    Doppler examination shows normal spontaneous and phasic flow.    No calf vein thrombosis is detected.    IMPRESSION:     No evidence of right lower extremity deep venous thrombosis.                        PREM AYALA M.D., ATTENDING RADIOLOGIST  This document has been electronically signed. May 24 2019  8:46AM

## 2019-05-24 NOTE — PROVIDER CONTACT NOTE (CRITICAL VALUE NOTIFICATION) - BACKGROUND
Pt's last H&H was 7.1/21.2, pt admitted with anemia and is supposed to go for endo today, received one unit PRBCs in the ED before being admitted to the unit

## 2019-05-24 NOTE — PROGRESS NOTE ADULT - SUBJECTIVE AND OBJECTIVE BOX
gastritis/esophagitis   upper gastrointestinal endoscopy bx    plan    f/u bx  if bleeding persists colonosocpy to be done  ppi once a day  cbc check

## 2019-05-24 NOTE — PROGRESS NOTE ADULT - SUBJECTIVE AND OBJECTIVE BOX
Patient is a 63y Male with a known history of :  Prophylactic measure (Z29.9)  CVA (cerebral vascular accident) (I63.9)  PVD (peripheral vascular disease) (I73.9)  Hypertension (I10)  DVT (deep venous thrombosis) (I82.409)  Anemia, unspecified type (D64.9)  Gastrointestinal hemorrhage, unspecified gastrointestinal hemorrhage type (K92.2)    HPI:  64 yo Male , [facility] resident with hx of H/O DVT (deep venous thrombosis ,nearly occlusive popliteal )on Xarelto   ,Hyperlipidemia  ,Hypertension and PVD (peripheral vascular disease) ,PAD ,LLE popliteal bypass ,s/p left 5th toe amputation ,CVA in 1987 with L hemiparesis and contractures ,hx of CAD , Quadriple bypass , left foot toe avulsion , s/p LLE gangrene and L AKA  .  p/w dark stools since this am ( as per nursing staff patient hasd a lot of melena in a diaper ) . Pt on xarelto, took last dose last night. Pt also took his plavix today. no weakness. no dizziness. no cp/sob/palp. no abd pain. no n/v. no recent fall / trauma. no agg/allev factors. No other inj or co Founfd to have anemia and blood transfusion of PRBCS is ordered ,seen by GI and EGD is recommended Palliative care consult requested ,to discuss advance directives and complete MOLST (23 May 2019 14:01)      REVIEW OF SYSTEMS:    CONSTITUTIONAL: No fever, weight loss, or fatigue  EYES: No eye pain, visual disturbances, or discharge  ENMT:  No difficulty hearing, tinnitus, vertigo; No sinus or throat pain  NECK: No pain or stiffness  BREASTS: No pain, masses, or nipple discharge  RESPIRATORY: No cough, wheezing, chills or hemoptysis; No shortness of breath  CARDIOVASCULAR: No chest pain, palpitations, dizziness, or leg swelling  GASTROINTESTINAL: No abdominal or epigastric pain. No nausea, vomiting, or hematemesis; No diarrhea or constipation. No melena or hematochezia.  GENITOURINARY: No dysuria, frequency, hematuria, or incontinence  NEUROLOGICAL: No headaches, memory loss, loss of strength, numbness, or tremors  SKIN: No itching, burning, rashes, or lesions   LYMPH NODES: No enlarged glands  ENDOCRINE: No heat or cold intolerance; No hair loss  MUSCULOSKELETAL: No joint pain or swelling; No muscle, back, or extremity pain  PSYCHIATRIC: No depression, anxiety, mood swings, or difficulty sleeping  HEME/LYMPH: No easy bruising, or bleeding gums  ALLERGY AND IMMUNOLOGIC: No hives or eczema    MEDICATIONS  (STANDING):  atorvastatin 10 milliGRAM(s) Oral at bedtime  metoprolol tartrate 25 milliGRAM(s) Oral two times a day  nystatin Powder 1 Application(s) Topical every 8 hours  pantoprazole  Injectable 40 milliGRAM(s) IV Push every 12 hours  sodium chloride 0.9%. 1000 milliLiter(s) (50 mL/Hr) IV Continuous <Continuous>    MEDICATIONS  (PRN):  acetaminophen   Tablet .. 650 milliGRAM(s) Oral every 6 hours PRN Temp greater or equal to 38C (100.4F), Mild Pain (1 - 3)      ALLERGIES: No Known Allergies      FAMILY HISTORY:  Family history of hypertension      PHYSICAL EXAMINATION:  -----------------------------  T(C): 37 (05-23-19 @ 23:29), Max: 37.6 (05-23-19 @ 16:36)  HR: 80 (05-24-19 @ 06:16) (71 - 110)  BP: 96/62 (05-24-19 @ 06:16) (95/63 - 118/74)  RR: 16 (05-23-19 @ 23:29) (16 - 18)  SpO2: 99% (05-23-19 @ 23:29) (99% - 100%)  Wt(kg): --    05-23 @ 07:01  -  05-24 @ 07:00  --------------------------------------------------------  IN:    Oral Fluid: 300 mL    sodium chloride 0.9%.: 100 mL  Total IN: 400 mL    OUT:    Incontinent per Condom Catheter: 750 mL  Total OUT: 750 mL    Total NET: -350 mL        Height (cm): 193.04 (05-23 @ 11:17)  Weight (kg): 90.7 (05-23 @ 11:17)  BMI (kg/m2): 24.3 (05-23 @ 11:17)  BSA (m2): 2.22 (05-23 @ 11:17)    Constitutional: well developed, normal appearance, well groomed, well nourished, no deformities and no acute distress.   Eyes: the conjunctiva exhibited no abnormalities and the eyelids demonstrated no xanthelasmas.   HEENT: normal oral mucosa, no oral pallor and no oral cyanosis.   Neck: normal jugular venous A waves present, normal jugular venous V waves present and no jugular venous cabrera A waves.   Pulmonary: no respiratory distress, normal respiratory rhythm and effort, no accessory muscle use and lungs were clear to auscultation bilaterally.   Cardiovascular: heart rate and rhythm were normal, normal S1 and S2 and no murmur, gallop, rub, heave or thrill are present.   Abdomen: soft, non-tender, no hepato-splenomegaly and no abdominal mass palpated.   Musculoskeletal: the gait could not be assessed..   Extremities: no clubbing of the fingernails, no localized cyanosis, no petechial hemorrhages and no ischemic changes.   Skin: normal skin color and pigmentation, no rash, no venous stasis, no skin lesions, no skin ulcer and no xanthoma was observed.   Psychiatric: oriented to person, place, and time, the affect was normal, the mood was normal and not feeling anxious.     LABS:   --------  05-24    145  |  111<H>  |  39<H>  ----------------------------<  88  4.2   |  24  |  0.81    Ca    8.0<L>      24 May 2019 05:08    TPro  6.9  /  Alb  3.2<L>  /  TBili  0.2  /  DBili  x   /  AST  11<L>  /  ALT  11<L>  /  AlkPhos  92  05-23                         6.8    5.55  )-----------( 246      ( 24 May 2019 05:08 )             20.8     PT/INR - ( 23 May 2019 12:02 )   PT: 18.5 sec;   INR: 1.60 ratio         PTT - ( 23 May 2019 12:02 )  PTT:48.0 sec    05-24 @ 03:17 CPK total:--, CKMB --, Troponin I - <.015 ng/mL  05-23 @ 18:43 CPK total:--, CKMB --, Troponin I - <.015 ng/mL          RADIOLOGY:  -----------------        ECG:
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
## 2019-05-25 LAB
ANION GAP SERPL CALC-SCNC: 7 MMOL/L — SIGNIFICANT CHANGE UP (ref 5–17)
BUN SERPL-MCNC: 21 MG/DL — SIGNIFICANT CHANGE UP (ref 7–23)
CALCIUM SERPL-MCNC: 7.9 MG/DL — LOW (ref 8.5–10.1)
CHLORIDE SERPL-SCNC: 112 MMOL/L — HIGH (ref 96–108)
CO2 SERPL-SCNC: 25 MMOL/L — SIGNIFICANT CHANGE UP (ref 22–31)
CREAT SERPL-MCNC: 0.68 MG/DL — SIGNIFICANT CHANGE UP (ref 0.5–1.3)
GLUCOSE SERPL-MCNC: 94 MG/DL — SIGNIFICANT CHANGE UP (ref 70–99)
HCT VFR BLD CALC: 27.9 % — LOW (ref 39–50)
HGB BLD-MCNC: 9.2 G/DL — LOW (ref 13–17)
MCHC RBC-ENTMCNC: 28.8 PG — SIGNIFICANT CHANGE UP (ref 27–34)
MCHC RBC-ENTMCNC: 33 GM/DL — SIGNIFICANT CHANGE UP (ref 32–36)
MCV RBC AUTO: 87.5 FL — SIGNIFICANT CHANGE UP (ref 80–100)
NRBC # BLD: 0 /100 WBCS — SIGNIFICANT CHANGE UP (ref 0–0)
PLATELET # BLD AUTO: 237 K/UL — SIGNIFICANT CHANGE UP (ref 150–400)
POTASSIUM SERPL-MCNC: 3.5 MMOL/L — SIGNIFICANT CHANGE UP (ref 3.5–5.3)
POTASSIUM SERPL-SCNC: 3.5 MMOL/L — SIGNIFICANT CHANGE UP (ref 3.5–5.3)
RBC # BLD: 3.19 M/UL — LOW (ref 4.2–5.8)
RBC # FLD: 14.5 % — SIGNIFICANT CHANGE UP (ref 10.3–14.5)
SODIUM SERPL-SCNC: 144 MMOL/L — SIGNIFICANT CHANGE UP (ref 135–145)
WBC # BLD: 5.9 K/UL — SIGNIFICANT CHANGE UP (ref 3.8–10.5)
WBC # FLD AUTO: 5.9 K/UL — SIGNIFICANT CHANGE UP (ref 3.8–10.5)

## 2019-05-25 RX ADMIN — Medication 1 GRAM(S): at 17:15

## 2019-05-25 RX ADMIN — Medication 25 MILLIGRAM(S): at 17:15

## 2019-05-25 RX ADMIN — NYSTATIN CREAM 1 APPLICATION(S): 100000 CREAM TOPICAL at 13:19

## 2019-05-25 RX ADMIN — NYSTATIN CREAM 1 APPLICATION(S): 100000 CREAM TOPICAL at 21:49

## 2019-05-25 RX ADMIN — NYSTATIN CREAM 1 APPLICATION(S): 100000 CREAM TOPICAL at 05:31

## 2019-05-25 RX ADMIN — Medication 1 GRAM(S): at 05:30

## 2019-05-25 RX ADMIN — PANTOPRAZOLE SODIUM 40 MILLIGRAM(S): 20 TABLET, DELAYED RELEASE ORAL at 05:30

## 2019-05-25 RX ADMIN — ATORVASTATIN CALCIUM 10 MILLIGRAM(S): 80 TABLET, FILM COATED ORAL at 21:50

## 2019-05-25 NOTE — PROGRESS NOTE ADULT - SUBJECTIVE AND OBJECTIVE BOX
[facility]  Brady Wasserman PA-C  [address] [place] [address]  [phone]      INTERVAL HPI/OVERNIGHT EVENTS:  s/p  upper gastrointestinal endoscopy  no active bleeding    MEDICATIONS  (STANDING):  atorvastatin 10 milliGRAM(s) Oral at bedtime  metoprolol tartrate 25 milliGRAM(s) Oral two times a day  nystatin Powder 1 Application(s) Topical every 8 hours  pantoprazole    Tablet 40 milliGRAM(s) Oral before breakfast  sodium chloride 0.9%. 1000 milliLiter(s) (50 mL/Hr) IV Continuous <Continuous>  sucralfate 1 Gram(s) Oral two times a day    MEDICATIONS  (PRN):  acetaminophen   Tablet .. 650 milliGRAM(s) Oral every 6 hours PRN Temp greater or equal to 38C (100.4F), Mild Pain (1 - 3)      Allergies    No Known Allergies    Intolerances        ROS:   General:  No wt loss, fevers, chills, night sweats, fatigue,   Eyes:  Good vision, no reported pain  ENT:  No sore throat, pain, runny nose, dysphagia  CV:  No pain, palpitations, hypo/hypertension  Resp:  No dyspnea, cough, tachypnea, wheezing  GI:  No pain, No nausea, No vomiting, No diarrhea, No constipation, No weight loss, No fever, No pruritis, No rectal bleeding, No tarry stools, No dysphagia,  :  No pain, bleeding, incontinence, nocturia  Muscle:  No pain, weakness  Neuro:  No weakness, tingling, memory problems  Psych:  No fatigue, insomnia, mood problems, depression  Endocrine:  No polyuria, polydipsia, cold/heat intolerance  Heme:  No petechiae, ecchymosis, easy bruisability  Skin:  No rash, tattoos, scars, edema      PHYSICAL EXAM:   Vital Signs:  Vital Signs Last 24 Hrs  T(C): 36.8 (25 May 2019 07:57), Max: 37 (25 May 2019 00:13)  T(F): 98.2 (25 May 2019 07:57), Max: 98.6 (25 May 2019 00:13)  HR: 69 (25 May 2019 07:57) (69 - 86)  BP: 108/69 (25 May 2019 07:57) (92/67 - 108/69)  BP(mean): --  RR: 18 (25 May 2019 07:57) (16 - 18)  SpO2: 100% (25 May 2019 07:57) (98% - 100%)  Daily     Daily     GENERAL:  Appears stated age, well-groomed, well-nourished, no distress  HEENT:  NC/AT,  conjunctivae clear and pink, no thyromegaly, nodules, adenopathy, no JVD, sclera -anicteric  CHEST:  Full & symmetric excursion, no increased effort, breath sounds clear  HEART:  Regular rhythm, S1, S2, no murmur/rub/S3/S4, no abdominal bruit, no edema  ABDOMEN:  Soft, non-tender, non-distended, normoactive bowel sounds,  no masses ,no hepato-splenomegaly, no signs of chronic liver disease  EXTEREMITIES:  no cyanosis,clubbing or edema  SKIN:  No rash/erythema/ecchymoses/petechiae/wounds/abscess/warm/dry  NEURO:  Alert, oriented, no asterixis, no tremor, no encephalopathy      LABS:                        9.2    5.90  )-----------( 237      ( 25 May 2019 09:44 )             27.9     05-25    144  |  112<H>  |  21  ----------------------------<  94  3.5   |  25  |  0.68    Ca    7.9<L>      25 May 2019 09:44    TPro  6.9  /  Alb  3.2<L>  /  TBili  0.2  /  DBili  x   /  AST  11<L>  /  ALT  11<L>  /  AlkPhos  92  05-23    PT/INR - ( 23 May 2019 12:02 )   PT: 18.5 sec;   INR: 1.60 ratio         PTT - ( 23 May 2019 12:02 )  PTT:48.0 sec  Urinalysis Basic - ( 23 May 2019 15:28 )    Color: Yellow / Appearance: Slightly Turbid / S.020 / pH: x  Gluc: x / Ketone: Negative  / Bili: Small / Urobili: Negative   Blood: x / Protein: 25 mg/dL / Nitrite: Positive   Leuk Esterase: Moderate / RBC: 0-2 /HPF / WBC >50   Sq Epi: x / Non Sq Epi: Occasional / Bacteria: Many        RADIOLOGY & ADDITIONAL TESTS:

## 2019-05-25 NOTE — PROGRESS NOTE ADULT - SUBJECTIVE AND OBJECTIVE BOX
Patient is a 63y old  Male who presents with a chief complaint of lower GIB (25 May 2019 11:31)       Pt is seen and examined  pt is awake and lying in bed/out of bed to chair  pt seems comfortable and denies any complaints at this time    HPI:  64 yo Male , [facility] resident with hx of H/O DVT (deep venous thrombosis ,nearly occlusive popliteal )on Xarelto   ,Hyperlipidemia  ,Hypertension and PVD (peripheral vascular disease) ,PAD ,LLE popliteal bypass ,s/p left 5th toe amputation ,CVA in 1987 with L hemiparesis and contractures ,hx of CAD , Quadriple bypass , left foot toe avulsion , s/p LLE gangrene and L AKA  .  p/w dark stools since this am ( as per nursing staff patient hasd a lot of melena in a diaper ) . Pt on xarelto, took last dose last night. Pt also took his plavix today. no weakness. no dizziness. no cp/sob/palp. no abd pain. no n/v. no recent fall / trauma. no agg/allev factors. No other inj or co Founfd to have anemia and blood transfusion of PRBCS is ordered ,seen by GI and EGD is recommended Palliative care consult requested ,to discuss advance directives and complete MOLST (23 May 2019 14:01)         ROS:  Negative except for:    MEDICATIONS  (STANDING):  atorvastatin 10 milliGRAM(s) Oral at bedtime  metoprolol tartrate 25 milliGRAM(s) Oral two times a day  nystatin Powder 1 Application(s) Topical every 8 hours  pantoprazole    Tablet 40 milliGRAM(s) Oral before breakfast  sodium chloride 0.9%. 1000 milliLiter(s) (50 mL/Hr) IV Continuous <Continuous>  sucralfate 1 Gram(s) Oral two times a day    MEDICATIONS  (PRN):  acetaminophen   Tablet .. 650 milliGRAM(s) Oral every 6 hours PRN Temp greater or equal to 38C (100.4F), Mild Pain (1 - 3)      Allergies    No Known Allergies    Intolerances        Vital Signs Last 24 Hrs  T(C): 36.8 (25 May 2019 07:57), Max: 37 (25 May 2019 00:13)  T(F): 98.2 (25 May 2019 07:57), Max: 98.6 (25 May 2019 00:13)  HR: 69 (25 May 2019 07:57) (69 - 86)  BP: 108/69 (25 May 2019 07:57) (92/67 - 108/69)  BP(mean): --  RR: 18 (25 May 2019 07:57) (16 - 18)  SpO2: 100% (25 May 2019 07:57) (98% - 100%)    PHYSICAL EXAM  General: adult in NAD  HEENT: clear oropharynx, anicteric sclera, pink conjunctiva  Neck: supple  CV: normal S1/S2 with no murmur rubs or gallops  Lungs: positive air movement b/l ant lungs,clear to auscultation, no wheezes, no rales  Abdomen: soft non-tender non-distended, no hepatosplenomegaly  Ext: no clubbing cyanosis or edema  Skin: no rashes and no petechiae  Neuro: alert and oriented X 4, no focal deficits  LABS:                          9.2    5.90  )-----------( 237      ( 25 May 2019 09:44 )             27.9         Mean Cell Volume : 87.5 fl  Mean Cell Hemoglobin : 28.8 pg  Mean Cell Hemoglobin Concentration : 33.0 gm/dL  Auto Neutrophil # : x  Auto Lymphocyte # : x  Auto Monocyte # : x  Auto Eosinophil # : x  Auto Basophil # : x  Auto Neutrophil % : x  Auto Lymphocyte % : x  Auto Monocyte % : x  Auto Eosinophil % : x  Auto Basophil % : x    Serial CBC's  05-25 @ 09:44  Hct-27.9 / Hgb-9.2 / Plat-237 / RBC-3.19 / WBC-5.90          Serial CBC's  05-24 @ 05:08  Hct-20.8 / Hgb-6.8 / Plat-246 / RBC-2.36 / WBC-5.55          Serial CBC's  05-23 @ 18:43  Hct-21.2 / Hgb-7.1 / Plat--- / RBC--- / WBC---          Serial CBC's  05-23 @ 12:02  Hct-21.9 / Hgb-7.0 / Plat-329 / RBC-2.46 / WBC-5.97            05-25    144  |  112<H>  |  21  ----------------------------<  94  3.5   |  25  |  0.68    Ca    7.9<L>      25 May 2019 09:44            Iron - Total Binding Capacity.: 182 ug/dL (05-24-19 @ 09:42)  Ferritin, Serum: 78 ng/mL (05-24-19 @ 09:42)  Vitamin B12, Serum: 466 pg/mL (05-24-19 @ 09:42)  Folate, Serum: 10.6 ng/mL (05-24-19 @ 09:42)  Reticulocyte Percent: 3.3 % (05-24-19 @ 05:08)                BLOOD SMEAR INTERPRETATION:       RADIOLOGY & ADDITIONAL STUDIES: Patient is a 63y old  Male who presents with a chief complaint of lower GIB (25 May 2019 11:31)       Pt is seen and examined  pt is awake and lying in bed  pt seems comfortable and denies any complaints at this time    HPI:  62 yo Male , [facility] resident with hx of H/O DVT (deep venous thrombosis ,nearly occlusive popliteal )on Xarelto   ,Hyperlipidemia  ,Hypertension and PVD (peripheral vascular disease) ,PAD ,LLE popliteal bypass ,s/p left 5th toe amputation ,CVA in 1987 with L hemiparesis and contractures ,hx of CAD , Quadriple bypass , left foot toe avulsion , s/p LLE gangrene and L AKA  .  p/w dark stools since this am ( as per nursing staff patient hasd a lot of melena in a diaper ) . Pt on xarelto, took last dose last night. Pt also took his plavix today. no weakness. no dizziness. no cp/sob/palp. no abd pain. no n/v. no recent fall / trauma. no agg/allev factors. No other inj or co Founfd to have anemia and blood transfusion of PRBCS is ordered ,seen by GI and EGD is recommended Palliative care consult requested ,to discuss advance directives and complete MOLST (23 May 2019 14:01)       MEDICATIONS  (STANDING):  atorvastatin 10 milliGRAM(s) Oral at bedtime  metoprolol tartrate 25 milliGRAM(s) Oral two times a day  nystatin Powder 1 Application(s) Topical every 8 hours  pantoprazole    Tablet 40 milliGRAM(s) Oral before breakfast  sodium chloride 0.9%. 1000 milliLiter(s) (50 mL/Hr) IV Continuous <Continuous>  sucralfate 1 Gram(s) Oral two times a day    MEDICATIONS  (PRN):  acetaminophen   Tablet .. 650 milliGRAM(s) Oral every 6 hours PRN Temp greater or equal to 38C (100.4F), Mild Pain (1 - 3)      Allergies    No Known Allergies    Intolerances        Vital Signs Last 24 Hrs  T(C): 36.8 (25 May 2019 07:57), Max: 37 (25 May 2019 00:13)  T(F): 98.2 (25 May 2019 07:57), Max: 98.6 (25 May 2019 00:13)  HR: 69 (25 May 2019 07:57) (69 - 86)  BP: 108/69 (25 May 2019 07:57) (92/67 - 108/69)  BP(mean): --  RR: 18 (25 May 2019 07:57) (16 - 18)  SpO2: 100% (25 May 2019 07:57) (98% - 100%)    PHYSICAL EXAM  General: adult in NAD  HEENT: clear oropharynx, anicteric sclera, pink conjunctiva  Neck: supple  CV: normal S1/S2 with no murmur rubs or gallops  Lungs: positive air movement b/l ant lungs,clear to auscultation, no wheezes, no rales  Abdomen: soft non-tender non-distended, no hepatosplenomegaly  Ext: no clubbing cyanosis or edema  Skin: no rashes and no petechiae  Neuro: alert and oriented X 4, no focal deficits  LABS:                          9.2    5.90  )-----------( 237      ( 25 May 2019 09:44 )             27.9         Mean Cell Volume : 87.5 fl  Mean Cell Hemoglobin : 28.8 pg  Mean Cell Hemoglobin Concentration : 33.0 gm/dL  Auto Neutrophil # : x  Auto Lymphocyte # : x  Auto Monocyte # : x  Auto Eosinophil # : x  Auto Basophil # : x  Auto Neutrophil % : x  Auto Lymphocyte % : x  Auto Monocyte % : x  Auto Eosinophil % : x  Auto Basophil % : x    Serial CBC's  05-25 @ 09:44  Hct-27.9 / Hgb-9.2 / Plat-237 / RBC-3.19 / WBC-5.90          Serial CBC's  05-24 @ 05:08  Hct-20.8 / Hgb-6.8 / Plat-246 / RBC-2.36 / WBC-5.55          Serial CBC's  05-23 @ 18:43  Hct-21.2 / Hgb-7.1 / Plat--- / RBC--- / WBC---          Serial CBC's  05-23 @ 12:02  Hct-21.9 / Hgb-7.0 / Plat-329 / RBC-2.46 / WBC-5.97            05-25    144  |  112<H>  |  21  ----------------------------<  94  3.5   |  25  |  0.68    Ca    7.9<L>      25 May 2019 09:44            Iron - Total Binding Capacity.: 182 ug/dL (05-24-19 @ 09:42)  Ferritin, Serum: 78 ng/mL (05-24-19 @ 09:42)  Vitamin B12, Serum: 466 pg/mL (05-24-19 @ 09:42)  Folate, Serum: 10.6 ng/mL (05-24-19 @ 09:42)  Reticulocyte Percent: 3.3 % (05-24-19 @ 05:08)        < from: US Duplex Venous Lower Ext Ltd, Right (05.24.19 @ 08:06) >    EXAM:  US DPLX LWR EXT VEINS LTD RT                            PROCEDURE DATE:  05/24/2019          INTERPRETATION:  CLINICAL INFORMATION: Follow-up exam. History of left   leg DVT. Recurrent stroke.    COMPARISON: 3/24/2019    TECHNIQUE: Duplex sonography of the RIGHT LOWER extremity with color and   spectral Doppler, with and without compression.      FINDINGS:    There is normal compressibility of the right common femoral, femoral and   popliteal veins.     The contralateral common femoral vein is patent.    Doppler examination shows normal spontaneous and phasic flow.    No calf vein thrombosis is detected.    IMPRESSION:     No evidence of right lower extremity deep venous thrombosis.                        PREM AYALA M.D., ATTENDING RADIOLOGIST  This document has been electronically signed. May 24 2019  8:46AM                < end of copied text >

## 2019-05-25 NOTE — PROGRESS NOTE ADULT - SUBJECTIVE AND OBJECTIVE BOX
Patient is a 63y Male with a known history of :  PVC (premature ventricular contraction) (I49.3)  Hyperlipidemia (E78.5)  ASHD (arteriosclerotic heart disease) (I25.10)  Prophylactic measure (Z29.9)  CVA (cerebral vascular accident) (I63.9)  PVD (peripheral vascular disease) (I73.9)  Hypertension (I10)  DVT (deep venous thrombosis) (I82.409)  Anemia, unspecified type (D64.9)  Gastrointestinal hemorrhage, unspecified gastrointestinal hemorrhage type (K92.2)    HPI:  62 yo Male , [facility] resident with hx of H/O DVT (deep venous thrombosis ,nearly occlusive popliteal )on Xarelto   ,Hyperlipidemia  ,Hypertension and PVD (peripheral vascular disease) ,PAD ,LLE popliteal bypass ,s/p left 5th toe amputation ,CVA in 1987 with L hemiparesis and contractures ,hx of CAD , Quadriple bypass , left foot toe avulsion , s/p LLE gangrene and L AKA  .  p/w dark stools since this am ( as per nursing staff patient hasd a lot of melena in a diaper ) . Pt on xarelto, took last dose last night. Pt also took his plavix today. no weakness. no dizziness. no cp/sob/palp. no abd pain. no n/v. no recent fall / trauma. no agg/allev factors. No other inj or co Founfd to have anemia and blood transfusion of PRBCS is ordered ,seen by GI and EGD is recommended Palliative care consult requested ,to discuss advance directives and complete MOLST (23 May 2019 14:01)      REVIEW OF SYSTEMS:    CONSTITUTIONAL: No fever, weight loss, or fatigue  EYES: No eye pain, visual disturbances, or discharge  ENMT:  No difficulty hearing, tinnitus, vertigo; No sinus or throat pain  NECK: No pain or stiffness  BREASTS: No pain, masses, or nipple discharge  RESPIRATORY: No cough, wheezing, chills or hemoptysis; No shortness of breath  CARDIOVASCULAR: No chest pain, palpitations, dizziness, or leg swelling  GASTROINTESTINAL: No abdominal or epigastric pain. No nausea, vomiting, or hematemesis; No diarrhea or constipation. No melena or hematochezia.  GENITOURINARY: No dysuria, frequency, hematuria, or incontinence  NEUROLOGICAL: No headaches, memory loss, loss of strength, numbness, or tremors  SKIN: No itching, burning, rashes, or lesions   LYMPH NODES: No enlarged glands  ENDOCRINE: No heat or cold intolerance; No hair loss  MUSCULOSKELETAL: No joint pain or swelling; No muscle, back, or extremity pain  PSYCHIATRIC: No depression, anxiety, mood swings, or difficulty sleeping  HEME/LYMPH: No easy bruising, or bleeding gums  ALLERGY AND IMMUNOLOGIC: No hives or eczema    MEDICATIONS  (STANDING):  atorvastatin 10 milliGRAM(s) Oral at bedtime  metoprolol tartrate 25 milliGRAM(s) Oral two times a day  nystatin Powder 1 Application(s) Topical every 8 hours  pantoprazole    Tablet 40 milliGRAM(s) Oral before breakfast  sodium chloride 0.9%. 1000 milliLiter(s) (50 mL/Hr) IV Continuous <Continuous>  sucralfate 1 Gram(s) Oral two times a day    MEDICATIONS  (PRN):  acetaminophen   Tablet .. 650 milliGRAM(s) Oral every 6 hours PRN Temp greater or equal to 38C (100.4F), Mild Pain (1 - 3)      ALLERGIES: No Known Allergies      FAMILY HISTORY:  Family history of hypertension      PHYSICAL EXAMINATION:  -----------------------------  T(C): 36.8 (05-25-19 @ 07:57), Max: 37 (05-25-19 @ 00:13)  HR: 69 (05-25-19 @ 07:57) (69 - 86)  BP: 108/69 (05-25-19 @ 07:57) (92/67 - 108/69)  RR: 18 (05-25-19 @ 07:57) (16 - 18)  SpO2: 100% (05-25-19 @ 07:57) (98% - 100%)  Wt(kg): --    05-24 @ 07:01  -  05-25 @ 07:00  --------------------------------------------------------  IN:    Oral Fluid: 360 mL    Packed Red Blood Cells: 304 mL    sodium chloride 0.9%.: 600 mL  Total IN: 1264 mL    OUT:    Incontinent per Condom Catheter: 1200 mL    Voided: 200 mL  Total OUT: 1400 mL    Total NET: -136 mL            Constitutional: well developed, normal appearance, well groomed, well nourished, no deformities and no acute distress.   Eyes: the conjunctiva exhibited no abnormalities and the eyelids demonstrated no xanthelasmas.   HEENT: normal oral mucosa, no oral pallor and no oral cyanosis.   Neck: normal jugular venous A waves present, normal jugular venous V waves present and no jugular venous cabrera A waves.   Pulmonary: no respiratory distress, normal respiratory rhythm and effort, no accessory muscle use and lungs were clear to auscultation bilaterally.   Cardiovascular: heart rate and rhythm were normal, normal S1 and S2 and no murmur, gallop, rub, heave or thrill are present.   Abdomen: soft, non-tender, no hepato-splenomegaly and no abdominal mass palpated.   Musculoskeletal: the gait could not be assessed..   Extremities: no clubbing of the fingernails, no localized cyanosis, no petechial hemorrhages and no ischemic changes.   Skin: normal skin color and pigmentation, no rash, no venous stasis, no skin lesions, no skin ulcer and no xanthoma was observed.   Psychiatric: oriented to person, place, and time, the affect was normal, the mood was normal and not feeling anxious.     LABS:   --------  05-25    144  |  112<H>  |  21  ----------------------------<  94  3.5   |  25  |  0.68    Ca    7.9<L>      25 May 2019 09:44    TPro  6.9  /  Alb  3.2<L>  /  TBili  0.2  /  DBili  x   /  AST  11<L>  /  ALT  11<L>  /  AlkPhos  92  05-23                         9.2    5.90  )-----------( 237      ( 25 May 2019 09:44 )             27.9     PT/INR - ( 23 May 2019 12:02 )   PT: 18.5 sec;   INR: 1.60 ratio         PTT - ( 23 May 2019 12:02 )  PTT:48.0 sec    05-24 @ 10:35 CPK total:--, CKMB --, Troponin I - <.015 ng/mL  05-24 @ 03:17 CPK total:--, CKMB --, Troponin I - <.015 ng/mL  05-23 @ 18:43 CPK total:--, CKMB --, Troponin I - <.015 ng/mL          RADIOLOGY:  -----------------        ECG:
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
## 2019-05-26 LAB
ANION GAP SERPL CALC-SCNC: 7 MMOL/L — SIGNIFICANT CHANGE UP (ref 5–17)
BUN SERPL-MCNC: 13 MG/DL — SIGNIFICANT CHANGE UP (ref 7–23)
CALCIUM SERPL-MCNC: 7.9 MG/DL — LOW (ref 8.5–10.1)
CHLORIDE SERPL-SCNC: 114 MMOL/L — HIGH (ref 96–108)
CO2 SERPL-SCNC: 25 MMOL/L — SIGNIFICANT CHANGE UP (ref 22–31)
CREAT SERPL-MCNC: 0.62 MG/DL — SIGNIFICANT CHANGE UP (ref 0.5–1.3)
GLUCOSE SERPL-MCNC: 84 MG/DL — SIGNIFICANT CHANGE UP (ref 70–99)
HCT VFR BLD CALC: 26.7 % — LOW (ref 39–50)
HGB BLD-MCNC: 8.8 G/DL — LOW (ref 13–17)
MCHC RBC-ENTMCNC: 28.9 PG — SIGNIFICANT CHANGE UP (ref 27–34)
MCHC RBC-ENTMCNC: 33 GM/DL — SIGNIFICANT CHANGE UP (ref 32–36)
MCV RBC AUTO: 87.8 FL — SIGNIFICANT CHANGE UP (ref 80–100)
NRBC # BLD: 0 /100 WBCS — SIGNIFICANT CHANGE UP (ref 0–0)
PLATELET # BLD AUTO: 259 K/UL — SIGNIFICANT CHANGE UP (ref 150–400)
POTASSIUM SERPL-MCNC: 3.3 MMOL/L — LOW (ref 3.5–5.3)
POTASSIUM SERPL-SCNC: 3.3 MMOL/L — LOW (ref 3.5–5.3)
RBC # BLD: 3.04 M/UL — LOW (ref 4.2–5.8)
RBC # FLD: 14.3 % — SIGNIFICANT CHANGE UP (ref 10.3–14.5)
SODIUM SERPL-SCNC: 146 MMOL/L — HIGH (ref 135–145)
WBC # BLD: 5.07 K/UL — SIGNIFICANT CHANGE UP (ref 3.8–10.5)
WBC # FLD AUTO: 5.07 K/UL — SIGNIFICANT CHANGE UP (ref 3.8–10.5)

## 2019-05-26 RX ORDER — POTASSIUM CHLORIDE 20 MEQ
40 PACKET (EA) ORAL EVERY 4 HOURS
Refills: 0 | Status: COMPLETED | OUTPATIENT
Start: 2019-05-26 | End: 2019-05-26

## 2019-05-26 RX ORDER — CLOPIDOGREL BISULFATE 75 MG/1
75 TABLET, FILM COATED ORAL DAILY
Refills: 0 | Status: DISCONTINUED | OUTPATIENT
Start: 2019-05-27 | End: 2019-05-27

## 2019-05-26 RX ORDER — OXYCODONE AND ACETAMINOPHEN 5; 325 MG/1; MG/1
1 TABLET ORAL EVERY 6 HOURS
Refills: 0 | Status: DISCONTINUED | OUTPATIENT
Start: 2019-05-26 | End: 2019-05-26

## 2019-05-26 RX ORDER — OXYCODONE AND ACETAMINOPHEN 5; 325 MG/1; MG/1
1 TABLET ORAL EVERY 6 HOURS
Refills: 0 | Status: DISCONTINUED | OUTPATIENT
Start: 2019-05-26 | End: 2019-05-27

## 2019-05-26 RX ADMIN — ATORVASTATIN CALCIUM 10 MILLIGRAM(S): 80 TABLET, FILM COATED ORAL at 21:30

## 2019-05-26 RX ADMIN — Medication 40 MILLIEQUIVALENT(S): at 18:05

## 2019-05-26 RX ADMIN — PANTOPRAZOLE SODIUM 40 MILLIGRAM(S): 20 TABLET, DELAYED RELEASE ORAL at 05:36

## 2019-05-26 RX ADMIN — Medication 25 MILLIGRAM(S): at 05:36

## 2019-05-26 RX ADMIN — SODIUM CHLORIDE 50 MILLILITER(S): 9 INJECTION INTRAMUSCULAR; INTRAVENOUS; SUBCUTANEOUS at 16:09

## 2019-05-26 RX ADMIN — Medication 1 GRAM(S): at 18:05

## 2019-05-26 RX ADMIN — OXYCODONE AND ACETAMINOPHEN 1 TABLET(S): 5; 325 TABLET ORAL at 20:30

## 2019-05-26 RX ADMIN — NYSTATIN CREAM 1 APPLICATION(S): 100000 CREAM TOPICAL at 05:36

## 2019-05-26 RX ADMIN — Medication 1 GRAM(S): at 05:36

## 2019-05-26 RX ADMIN — NYSTATIN CREAM 1 APPLICATION(S): 100000 CREAM TOPICAL at 15:06

## 2019-05-26 RX ADMIN — Medication 40 MILLIEQUIVALENT(S): at 15:06

## 2019-05-26 RX ADMIN — OXYCODONE AND ACETAMINOPHEN 1 TABLET(S): 5; 325 TABLET ORAL at 19:56

## 2019-05-26 RX ADMIN — NYSTATIN CREAM 1 APPLICATION(S): 100000 CREAM TOPICAL at 21:30

## 2019-05-26 NOTE — PROGRESS NOTE ADULT - SUBJECTIVE AND OBJECTIVE BOX
[facility]  Brady Wasserman PA-C  [address] [place] [address]  [phone]      INTERVAL HPI/OVERNIGHT EVENTS:  no melena or hematochezia      MEDICATIONS  (STANDING):  atorvastatin 10 milliGRAM(s) Oral at bedtime  metoprolol tartrate 25 milliGRAM(s) Oral two times a day  nystatin Powder 1 Application(s) Topical every 8 hours  pantoprazole    Tablet 40 milliGRAM(s) Oral before breakfast  sodium chloride 0.9%. 1000 milliLiter(s) (50 mL/Hr) IV Continuous <Continuous>  sucralfate 1 Gram(s) Oral two times a day    MEDICATIONS  (PRN):  acetaminophen   Tablet .. 650 milliGRAM(s) Oral every 6 hours PRN Temp greater or equal to 38C (100.4F), Mild Pain (1 - 3)      Allergies    No Known Allergies    Intolerances        ROS:   General:  No wt loss, fevers, chills, night sweats, fatigue,   Eyes:  Good vision, no reported pain  ENT:  No sore throat, pain, runny nose, dysphagia  CV:  No pain, palpitations, hypo/hypertension  Resp:  No dyspnea, cough, tachypnea, wheezing  GI:  No pain, No nausea, No vomiting, No diarrhea, No constipation, No weight loss, No fever, No pruritis, No rectal bleeding, No tarry stools, No dysphagia,  :  No pain, bleeding, incontinence, nocturia  Muscle:  No pain, weakness  Neuro:  No weakness, tingling, memory problems  Psych:  No fatigue, insomnia, mood problems, depression  Endocrine:  No polyuria, polydipsia, cold/heat intolerance  Heme:  No petechiae, ecchymosis, easy bruisability  Skin:  No rash, tattoos, scars, edema      PHYSICAL EXAM:   Vital Signs:  Vital Signs Last 24 Hrs  T(C): 36.8 (25 May 2019 07:57), Max: 37 (25 May 2019 00:13)  T(F): 98.2 (25 May 2019 07:57), Max: 98.6 (25 May 2019 00:13)  HR: 69 (25 May 2019 07:57) (69 - 86)  BP: 108/69 (25 May 2019 07:57) (92/67 - 108/69)  BP(mean): --  RR: 18 (25 May 2019 07:57) (16 - 18)  SpO2: 100% (25 May 2019 07:57) (98% - 100%)  Daily     Daily     GENERAL:  Appears stated age, well-groomed, well-nourished, no distress  HEENT:  NC/AT,  conjunctivae clear and pink, no thyromegaly, nodules, adenopathy, no JVD, sclera -anicteric  CHEST:  Full & symmetric excursion, no increased effort, breath sounds clear  HEART:  Regular rhythm, S1, S2, no murmur/rub/S3/S4, no abdominal bruit, no edema  ABDOMEN:  Soft, non-tender, non-distended, normoactive bowel sounds,  no masses ,no hepato-splenomegaly, no signs of chronic liver disease  EXTEREMITIES:  no cyanosis,clubbing or edema  SKIN:  No rash/erythema/ecchymoses/petechiae/wounds/abscess/warm/dry  NEURO:  Alert, oriented, no asterixis, no tremor, no encephalopathy      LABS:                        9.2    5.90  )-----------( 237      ( 25 May 2019 09:44 )             27.9     05-25    144  |  112<H>  |  21  ----------------------------<  94  3.5   |  25  |  0.68    Ca    7.9<L>      25 May 2019 09:44    TPro  6.9  /  Alb  3.2<L>  /  TBili  0.2  /  DBili  x   /  AST  11<L>  /  ALT  11<L>  /  AlkPhos  92  05-23    PT/INR - ( 23 May 2019 12:02 )   PT: 18.5 sec;   INR: 1.60 ratio         PTT - ( 23 May 2019 12:02 )  PTT:48.0 sec  Urinalysis Basic - ( 23 May 2019 15:28 )    Color: Yellow / Appearance: Slightly Turbid / S.020 / pH: x  Gluc: x / Ketone: Negative  / Bili: Small / Urobili: Negative   Blood: x / Protein: 25 mg/dL / Nitrite: Positive   Leuk Esterase: Moderate / RBC: 0-2 /HPF / WBC >50   Sq Epi: x / Non Sq Epi: Occasional / Bacteria: Many        RADIOLOGY & ADDITIONAL TESTS:
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
## 2019-05-26 NOTE — PHYSICAL THERAPY INITIAL EVALUATION ADULT - PERTINENT HX OF CURRENT PROBLEM, REHAB EVAL
Pt is 63 y.o. M [facility] resident with hx DVT presents with dark stools since AM of presentation, dx with anemia with blood transfusion ordered.

## 2019-05-26 NOTE — PHYSICAL THERAPY INITIAL EVALUATION ADULT - RANGE OF MOTION EXAMINATION, REHAB EVAL
LUE ROM deficits hx CVA/Right UE ROM was WNL (within normal limits)/bilateral lower extremity ROM was WFL (within functional limits)

## 2019-05-26 NOTE — PHYSICAL THERAPY INITIAL EVALUATION ADULT - CRITERIA FOR SKILLED THERAPEUTIC INTERVENTIONS
impairments found/functional limitations in following categories/therapy frequency/predicted duration of therapy intervention/risk reduction/prevention/anticipated discharge recommendation/rehab potential
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
## 2019-05-26 NOTE — CONSULT NOTE ADULT - ASSESSMENT
63 y.o. male with a left AKA  and hx. of DVT was admitted for a GI bleed. There is no evidence for DVT and there is no need for  AC therapy. I agree with resuming Plavix in view of his prior CVA. The patient was given informed consent.

## 2019-05-26 NOTE — PHYSICAL THERAPY INITIAL EVALUATION ADULT - ADDITIONAL COMMENTS
Pt states he lives in group home, unable to specify if SNF, states he's been non ambulatory and independent with all transfers, requires A with all ADL's.

## 2019-05-26 NOTE — PROGRESS NOTE ADULT - SUBJECTIVE AND OBJECTIVE BOX
Patient is a 63y Male with a known history of :  PVC (premature ventricular contraction) (I49.3)  Hyperlipidemia (E78.5)  ASHD (arteriosclerotic heart disease) (I25.10)  Prophylactic measure (Z29.9)  CVA (cerebral vascular accident) (I63.9)  PVD (peripheral vascular disease) (I73.9)  Hypertension (I10)  DVT (deep venous thrombosis) (I82.409)  Anemia, unspecified type (D64.9)  Gastrointestinal hemorrhage, unspecified gastrointestinal hemorrhage type (K92.2)    HPI:  64 yo Male , [facility] resident with hx of H/O DVT (deep venous thrombosis ,nearly occlusive popliteal )on Xarelto   ,Hyperlipidemia  ,Hypertension and PVD (peripheral vascular disease) ,PAD ,LLE popliteal bypass ,s/p left 5th toe amputation ,CVA in 1987 with L hemiparesis and contractures ,hx of CAD , Quadriple bypass , left foot toe avulsion , s/p LLE gangrene and L AKA  .  p/w dark stools since this am ( as per nursing staff patient hasd a lot of melena in a diaper ) . Pt on xarelto, took last dose last night. Pt also took his plavix today. no weakness. no dizziness. no cp/sob/palp. no abd pain. no n/v. no recent fall / trauma. no agg/allev factors. No other inj or co Founfd to have anemia and blood transfusion of PRBCS is ordered ,seen by GI and EGD is recommended Palliative care consult requested ,to discuss advance directives and complete MOLST (23 May 2019 14:01)      REVIEW OF SYSTEMS:    CONSTITUTIONAL: No fever, weight loss, or fatigue  EYES: No eye pain, visual disturbances, or discharge  ENMT:  No difficulty hearing, tinnitus, vertigo; No sinus or throat pain  NECK: No pain or stiffness  BREASTS: No pain, masses, or nipple discharge  RESPIRATORY: No cough, wheezing, chills or hemoptysis; No shortness of breath  CARDIOVASCULAR: No chest pain, palpitations, dizziness, or leg swelling  GASTROINTESTINAL: No abdominal or epigastric pain. No nausea, vomiting, or hematemesis; No diarrhea or constipation. No melena or hematochezia.  GENITOURINARY: No dysuria, frequency, hematuria, or incontinence  NEUROLOGICAL: No headaches, memory loss, loss of strength, numbness, or tremors  SKIN: No itching, burning, rashes, or lesions   LYMPH NODES: No enlarged glands  ENDOCRINE: No heat or cold intolerance; No hair loss  MUSCULOSKELETAL: No joint pain or swelling; No muscle, back, or extremity pain  PSYCHIATRIC: No depression, anxiety, mood swings, or difficulty sleeping  HEME/LYMPH: No easy bruising, or bleeding gums  ALLERGY AND IMMUNOLOGIC: No hives or eczema    MEDICATIONS  (STANDING):  atorvastatin 10 milliGRAM(s) Oral at bedtime  metoprolol tartrate 25 milliGRAM(s) Oral two times a day  nystatin Powder 1 Application(s) Topical every 8 hours  pantoprazole    Tablet 40 milliGRAM(s) Oral before breakfast  sodium chloride 0.9%. 1000 milliLiter(s) (50 mL/Hr) IV Continuous <Continuous>  sucralfate 1 Gram(s) Oral two times a day    MEDICATIONS  (PRN):  acetaminophen   Tablet .. 650 milliGRAM(s) Oral every 6 hours PRN Temp greater or equal to 38C (100.4F), Mild Pain (1 - 3)      ALLERGIES: No Known Allergies      FAMILY HISTORY:  Family history of hypertension      PHYSICAL EXAMINATION:  -----------------------------  T(C): 36.7 (05-26-19 @ 09:16), Max: 37.1 (05-26-19 @ 00:11)  HR: 63 (05-26-19 @ 09:16) (63 - 79)  BP: 106/64 (05-26-19 @ 09:16) (99/69 - 107/70)  RR: 18 (05-26-19 @ 09:16) (16 - 18)  SpO2: 99% (05-26-19 @ 09:16) (98% - 100%)  Wt(kg): --    05-25 @ 07:01  -  05-26 @ 07:00  --------------------------------------------------------  IN:    Oral Fluid: 300 mL  Total IN: 300 mL    OUT:    Incontinent per Condom Catheter: 200 mL  Total OUT: 200 mL    Total NET: 100 mL            Constitutional: well developed, normal appearance, well groomed, well nourished, no deformities and no acute distress.   Eyes: the conjunctiva exhibited no abnormalities and the eyelids demonstrated no xanthelasmas.   HEENT: normal oral mucosa, no oral pallor and no oral cyanosis.   Neck: normal jugular venous A waves present, normal jugular venous V waves present and no jugular venous cabrera A waves.   Pulmonary: no respiratory distress, normal respiratory rhythm and effort, no accessory muscle use and lungs were clear to auscultation bilaterally.   Cardiovascular: heart rate and rhythm were normal, normal S1 and S2 and no murmur, gallop, rub, heave or thrill are present.   Abdomen: soft, non-tender, no hepato-splenomegaly and no abdominal mass palpated.   Musculoskeletal: the gait could not be assessed..   Extremities: no clubbing of the fingernails, no localized cyanosis, no petechial hemorrhages and no ischemic changes.   Skin: normal skin color and pigmentation, no rash, no venous stasis, no skin lesions, no skin ulcer and no xanthoma was observed.   Psychiatric: oriented to person, place, and time, the affect was normal, the mood was normal and not feeling anxious.     LABS:   --------  05-26    146<H>  |  114<H>  |  13  ----------------------------<  84  3.3<L>   |  25  |  0.62    Ca    7.9<L>      26 May 2019 08:30                           8.8    5.07  )-----------( 259      ( 26 May 2019 08:30 )             26.7         05-24 @ 10:35 CPK total:--, CKMB --, Troponin I - <.015 ng/mL          RADIOLOGY:  -----------------        ECG:

## 2019-05-27 ENCOUNTER — TRANSCRIPTION ENCOUNTER (OUTPATIENT)
Age: 64
End: 2019-05-27

## 2019-05-27 VITALS
HEART RATE: 65 BPM | SYSTOLIC BLOOD PRESSURE: 118 MMHG | TEMPERATURE: 99 F | OXYGEN SATURATION: 100 % | RESPIRATION RATE: 16 BRPM | DIASTOLIC BLOOD PRESSURE: 74 MMHG

## 2019-05-27 LAB
ANION GAP SERPL CALC-SCNC: 5 MMOL/L — SIGNIFICANT CHANGE UP (ref 5–17)
BUN SERPL-MCNC: 9 MG/DL — SIGNIFICANT CHANGE UP (ref 7–23)
CALCIUM SERPL-MCNC: 8 MG/DL — LOW (ref 8.5–10.1)
CARDIOLIPIN AB SER-ACNC: NEGATIVE — SIGNIFICANT CHANGE UP
CHLORIDE SERPL-SCNC: 113 MMOL/L — HIGH (ref 96–108)
CO2 SERPL-SCNC: 27 MMOL/L — SIGNIFICANT CHANGE UP (ref 22–31)
CREAT SERPL-MCNC: 0.68 MG/DL — SIGNIFICANT CHANGE UP (ref 0.5–1.3)
GLUCOSE SERPL-MCNC: 88 MG/DL — SIGNIFICANT CHANGE UP (ref 70–99)
HCT VFR BLD CALC: 28.4 % — LOW (ref 39–50)
HGB BLD-MCNC: 9.2 G/DL — LOW (ref 13–17)
MCHC RBC-ENTMCNC: 28.8 PG — SIGNIFICANT CHANGE UP (ref 27–34)
MCHC RBC-ENTMCNC: 32.4 GM/DL — SIGNIFICANT CHANGE UP (ref 32–36)
MCV RBC AUTO: 88.8 FL — SIGNIFICANT CHANGE UP (ref 80–100)
NRBC # BLD: 0 /100 WBCS — SIGNIFICANT CHANGE UP (ref 0–0)
PLATELET # BLD AUTO: 264 K/UL — SIGNIFICANT CHANGE UP (ref 150–400)
POTASSIUM SERPL-MCNC: 4.2 MMOL/L — SIGNIFICANT CHANGE UP (ref 3.5–5.3)
POTASSIUM SERPL-SCNC: 4.2 MMOL/L — SIGNIFICANT CHANGE UP (ref 3.5–5.3)
RBC # BLD: 3.2 M/UL — LOW (ref 4.2–5.8)
RBC # FLD: 14.4 % — SIGNIFICANT CHANGE UP (ref 10.3–14.5)
SODIUM SERPL-SCNC: 145 MMOL/L — SIGNIFICANT CHANGE UP (ref 135–145)
WBC # BLD: 5.87 K/UL — SIGNIFICANT CHANGE UP (ref 3.8–10.5)
WBC # FLD AUTO: 5.87 K/UL — SIGNIFICANT CHANGE UP (ref 3.8–10.5)

## 2019-05-27 PROCEDURE — 85014 HEMATOCRIT: CPT

## 2019-05-27 PROCEDURE — 82728 ASSAY OF FERRITIN: CPT

## 2019-05-27 PROCEDURE — 82272 OCCULT BLD FECES 1-3 TESTS: CPT

## 2019-05-27 PROCEDURE — 86901 BLOOD TYPING SEROLOGIC RH(D): CPT

## 2019-05-27 PROCEDURE — 85730 THROMBOPLASTIN TIME PARTIAL: CPT

## 2019-05-27 PROCEDURE — 85027 COMPLETE CBC AUTOMATED: CPT

## 2019-05-27 PROCEDURE — 86900 BLOOD TYPING SEROLOGIC ABO: CPT

## 2019-05-27 PROCEDURE — 85018 HEMOGLOBIN: CPT

## 2019-05-27 PROCEDURE — 83010 ASSAY OF HAPTOGLOBIN QUANT: CPT

## 2019-05-27 PROCEDURE — 83540 ASSAY OF IRON: CPT

## 2019-05-27 PROCEDURE — 96360 HYDRATION IV INFUSION INIT: CPT

## 2019-05-27 PROCEDURE — 85610 PROTHROMBIN TIME: CPT

## 2019-05-27 PROCEDURE — 36430 TRANSFUSION BLD/BLD COMPNT: CPT

## 2019-05-27 PROCEDURE — P9016: CPT

## 2019-05-27 PROCEDURE — 85045 AUTOMATED RETICULOCYTE COUNT: CPT

## 2019-05-27 PROCEDURE — 81001 URINALYSIS AUTO W/SCOPE: CPT

## 2019-05-27 PROCEDURE — 86147 CARDIOLIPIN ANTIBODY EA IG: CPT

## 2019-05-27 PROCEDURE — 80053 COMPREHEN METABOLIC PANEL: CPT

## 2019-05-27 PROCEDURE — 86923 COMPATIBILITY TEST ELECTRIC: CPT

## 2019-05-27 PROCEDURE — 93005 ELECTROCARDIOGRAM TRACING: CPT

## 2019-05-27 PROCEDURE — 93971 EXTREMITY STUDY: CPT

## 2019-05-27 PROCEDURE — 99285 EMERGENCY DEPT VISIT HI MDM: CPT | Mod: 25

## 2019-05-27 PROCEDURE — 93306 TTE W/DOPPLER COMPLETE: CPT

## 2019-05-27 PROCEDURE — 36415 COLL VENOUS BLD VENIPUNCTURE: CPT

## 2019-05-27 PROCEDURE — 84484 ASSAY OF TROPONIN QUANT: CPT

## 2019-05-27 PROCEDURE — 83550 IRON BINDING TEST: CPT

## 2019-05-27 PROCEDURE — 97162 PT EVAL MOD COMPLEX 30 MIN: CPT

## 2019-05-27 PROCEDURE — 80048 BASIC METABOLIC PNL TOTAL CA: CPT

## 2019-05-27 PROCEDURE — 83690 ASSAY OF LIPASE: CPT

## 2019-05-27 PROCEDURE — 82746 ASSAY OF FOLIC ACID SERUM: CPT

## 2019-05-27 PROCEDURE — 71045 X-RAY EXAM CHEST 1 VIEW: CPT

## 2019-05-27 PROCEDURE — 82607 VITAMIN B-12: CPT

## 2019-05-27 PROCEDURE — 86850 RBC ANTIBODY SCREEN: CPT

## 2019-05-27 RX ORDER — SUCRALFATE 1 G
1 TABLET ORAL
Qty: 0 | Refills: 0 | DISCHARGE
Start: 2019-05-27

## 2019-05-27 RX ORDER — METOPROLOL TARTRATE 50 MG
1 TABLET ORAL
Qty: 0 | Refills: 0 | DISCHARGE
Start: 2019-05-27

## 2019-05-27 RX ORDER — PANTOPRAZOLE SODIUM 20 MG/1
1 TABLET, DELAYED RELEASE ORAL
Qty: 0 | Refills: 0 | DISCHARGE
Start: 2019-05-27

## 2019-05-27 RX ORDER — LISINOPRIL 2.5 MG/1
1 TABLET ORAL
Qty: 0 | Refills: 0 | DISCHARGE

## 2019-05-27 RX ORDER — NYSTATIN CREAM 100000 [USP'U]/G
1 CREAM TOPICAL
Qty: 0 | Refills: 0 | DISCHARGE
Start: 2019-05-27

## 2019-05-27 RX ADMIN — Medication 1 GRAM(S): at 05:20

## 2019-05-27 RX ADMIN — PANTOPRAZOLE SODIUM 40 MILLIGRAM(S): 20 TABLET, DELAYED RELEASE ORAL at 05:20

## 2019-05-27 RX ADMIN — Medication 25 MILLIGRAM(S): at 05:20

## 2019-05-27 RX ADMIN — OXYCODONE AND ACETAMINOPHEN 1 TABLET(S): 5; 325 TABLET ORAL at 10:16

## 2019-05-27 RX ADMIN — OXYCODONE AND ACETAMINOPHEN 1 TABLET(S): 5; 325 TABLET ORAL at 09:16

## 2019-05-27 RX ADMIN — CLOPIDOGREL BISULFATE 75 MILLIGRAM(S): 75 TABLET, FILM COATED ORAL at 11:49

## 2019-05-27 RX ADMIN — NYSTATIN CREAM 1 APPLICATION(S): 100000 CREAM TOPICAL at 05:20

## 2019-05-27 NOTE — PROGRESS NOTE ADULT - PROBLEM SELECTOR PROBLEM 4
DVT (deep venous thrombosis)
Hypertension
DVT (deep venous thrombosis)
Hypertension

## 2019-05-27 NOTE — PROGRESS NOTE ADULT - PROBLEM SELECTOR PROBLEM 2
Anemia, unspecified type
CVA (cerebral vascular accident)

## 2019-05-27 NOTE — PROGRESS NOTE ADULT - PROBLEM SELECTOR PROBLEM 7
PVC (premature ventricular contraction)
Prophylactic measure
PVC (premature ventricular contraction)
Prophylactic measure

## 2019-05-27 NOTE — DISCHARGE NOTE NURSING/CASE MANAGEMENT/SOCIAL WORK - NSDCPEWEB_GEN_ALL_CORE
[place] website --- [email or web address] Tobacco [email or web address] website --- [email or web address]

## 2019-05-27 NOTE — PROGRESS NOTE ADULT - PROBLEM SELECTOR PLAN 7
on metoprolol
Gastrointestinal stress ulcer prophylaxis and DVT prophylaxis administrated
on metoprolol

## 2019-05-27 NOTE — DISCHARGE NOTE NURSING/CASE MANAGEMENT/SOCIAL WORK - NSDCDPATPORTLINK_GEN_ALL_CORE
You can access the [email or web address] Portal, offered by [facility], by registering with the following website: [email or web address]

## 2019-05-27 NOTE — PROGRESS NOTE ADULT - ASSESSMENT
64 yo Male , [facility] resident with hx of H/O DVT (deep venous thrombosis ,nearly occlusive popliteal )on Xarelto   ,Hyperlipidemia  ,Hypertension and PVD (peripheral vascular disease) ,PAD ,LLE popliteal bypass ,s/p left 5th toe amputation ,CVA in 1987 with L hemiparesis and contractures ,hx of CAD , Quadriple bypass , left foot toe avulsion , s/p LLE gangrene and L AKA  .  p/w dark stools since this am ( as per nursing staff patient hasd a lot of melena in a diaper ) . Pt on xarelto, took last dose last night. Pt also took his plavix today. no weakness. no dizziness. no cp/sob/palp. no abd pain. no n/v. no recent fall / trauma. no agg/allev factors. No other inj or co Founfd to have anemia and blood transfusion of PRBCS is ordered ,seen by GI and EGD is recommended Palliative care consult requested ,to discuss advance directives and complete MOLST
62 yo Male , Pending sale to [facility] resident with hx of H/O DVT (deep venous thrombosis ,nearly occlusive popliteal )on Xarelto   ,Hyperlipidemia  ,Hypertension and PVD (peripheral vascular disease) ,PAD ,LLE popliteal bypass ,s/p left 5th toe amputation ,CVA in 1987 with L hemiparesis and contractures ,hx of CAD , Quadriple bypass , left foot toe avulsion , s/p LLE gangrene and L AKA  .  p/w dark stools since this am ( as per nursing staff patient hasd a lot of melena in a diaper ) . Pt on xarelto, took last dose last night. Pt also took his plavix today. no weakness. no dizziness. no cp/sob/palp. no abd pain. no n/v. no recent fall / trauma. no agg/allev factors. No other inj or co Founfd to have anemia and blood transfusion of PRBCS is ordered ,seen by GI and EGD is recommended Palliative care consult requested ,to discuss advance directives and complete MOLST
62 yo Male , [facility] resident with hx of H/O DVT (deep venous thrombosis ,nearly occlusive popliteal )on Xarelto   ,Hyperlipidemia  ,Hypertension and PVD (peripheral vascular disease) ,PAD ,LLE popliteal bypass ,s/p left 5th toe amputation ,CVA in 1987 with L hemiparesis and contractures ,hx of CAD , Quadriple bypass , left foot toe avulsion , s/p LLE gangrene and L AKA  .  p/w dark stools since this am ( as per nursing staff patient hasd a lot of melena in a diaper ) . Pt on xarelto, took last dose last night. Pt also took his plavix today. no weakness. no dizziness. no cp/sob/palp. no abd pain. no n/v. no recent fall / trauma. no agg/allev factors. No other inj or co Founfd to have anemia and blood transfusion of PRBCS is ordered ,seen by GI and EGD is recommended Palliative care consult requested ,to discuss advance directives and complete MOLST
64 yo Male , [facility] resident with hx of H/O DVT (deep venous thrombosis ,nearly occlusive popliteal )on Xarelto   ,Hyperlipidemia  ,Hypertension and PVD (peripheral vascular disease) ,PAD ,LLE popliteal bypass ,s/p left 5th toe amputation ,CVA in 1987 with L hemiparesis and contractures ,hx of CAD , Quadriple bypass , left foot toe avulsion , s/p LLE gangrene and L AKA  .  p/w dark stools since this am ( as per nursing staff patient hasd a lot of melena in a diaper ) . Pt on xarelto, took last dose last night. Pt also took his plavix today. no weakness. no dizziness. no cp/sob/palp. no abd pain. no n/v. no recent fall / trauma. no agg/allev factors. No other inj or co Founfd to have anemia and blood transfusion of PRBCS is ordered ,seen by GI and EGD is recommended Palliative care consult requested ,to discuss advance directives and complete MOLST
lower  gi bleeding - s/p transfusion  ashf - no angina-st-t changes  troponin x2 neg  ventricular ectopy - on metoprolol  s/p cva  s/p amputation of leg left  s/p dvt  xarleto and plavix - dc
lower  gi bleeding - s/p transfusion  ashf - no angina-st-t changes  troponin x2 neg  ventricular ectopy - on metoprolol  s/p cva  s/p amputation of leg left  s/p dvt  xarleto and plavix - dc  no more ectopy -ef 60%-dc tele
lower  gi bleeding - s/p transfusion  ashf - no angina-st-t changes  troponin x2 neg  ventricular ectopy - on metoprolol  s/p cva  s/p amputation of leg left  s/p dvt  xarleto and plavix - dc  no more ectopy -ef 60%-dc tele
IMPRESSION:  Patient is a 63y old  Male who presents with a chief complaint of lower GIB, hematology was consulted for anemia  anemia of gi blood loss-s/p  prbc transfusion 5/23 and 5/24, anticoagulation/antiplatelet on hold as per pcp/gi  dark stool, no obvious bleeding, denies any complaints  multiple co morbid medical conditions  s/p left aka---3/25/2019  hx of repeated cva per pt/girlfriend--1986/1987 and then around 2016 with worsening left sided weakness as per girlfriend  pad/pvd/hx repeated stroke and had been on anticoagulation as per pt/girl friend  hx dvt in lle 2/2019 with no dvt in rle, s/p left leg aka--3/2019  RECOMMENDATION:  anemia--likely multifactorial, s/p prbc transfusion  anemia w/u--low iron, watson sat--17  %  will start iv iron as looking for as rapid response  gi work up as per pt/gi/family  hb today 9.2  monitor h/h----transfuse prbc as needed for symptomatic anemia   follow gi and gi work up/egd per gi  hx left leg dvt---2/2019, s/p left aka--3/2019  negative lupus ac, follow cardiolipin ab  s/p rle doppler--negative dvt  patient is with h/o pvd/multiple cva---anticoagulation as per pcp/gi/vas surgery  supportive care
IMPRESSION:  Patient is a 63y old  Male who presents with a chief complaint of lower GIB, hematology was consulted for anemia  anemia of gi blood loss-s/p  prbc transfusion 5/23 and 5/24, anticoagulation/antiplatelet on hold as per pcp/gi  dark stool, no obvious bleeding, denies any complaints  multiple co morbid medical conditions  s/p left aka---3/25/2019  hx of repeated cva per pt/girlfriend--1986/1987 and then around 2016 with worsening left sided weakness as per girlfriend  pad/pvd/hx repeated stroke and had been on anticoagulation as per pt/girl friend  hx dvt in lle 2/2019 with no dvt in rle, s/p left leg aka--3/2019  RECOMMENDATION:  anemia--likely multifactorial, s/p prbc transfusion  anemia w/u--low iron, watson sat--17  %, adequate ferritin at 71, nl hapto--no evidence of hemolysis  i suggest po fe 325 mg qd for 30 days-- on short course of iron supplement as out patient  gi work up as per pt/gi/family  hb today 9.2--5/27--stable  monitor h/h----transfuse prbc as needed for symptomatic anemia   follow gi and gi work up/egd per gi  hx left leg dvt---2/2019, s/p left aka--3/2019  negative lupus ac, follow cardiolipin ab  s/p rle doppler--negative dvt  patient is with h/o pvd/multiple cva---anticoagulation as per pcp/gi/vas surgery  supportive care
IMPRESSION:  anemia  gi blood loss--is getting prbc transfusion  dark stool, no obvious bleeding, denies any complaints  multiple co morbid medical conditions  s/p left aka---3/25/2019  hx of repeated cva per pt/girlfriend--1986/1987 and then around 2016 with worsening left sided weakness as per girlfriend  pad/pvd/hx repeated stroke and had been on anticoagulation as per pt/girl friend  hx dvt in lle 2/2019 with no dvt in rle, s/p left leg aka--3/2019    RECOMMENDATION:  getting blood already  anemia work up--getting prbc transfusion  monitor h/h----transfuse prbc as needed for symptomatic anemia   follow gi and gi work up/egd per gi  lupus ac/cardiolipin ab/anemia w/u, rle doppler  supportive care
lower  gi bleeding - s/p transfusion  ashf - no angina-st-t changes  troponin x2 neg  ventricular ectopy - on metoprolol  s/p cva  s/p amputation of leg left  s/p dvt  xarleto and plavix - dc  no more ectopy -ef 60%-dc tele

## 2019-05-27 NOTE — DISCHARGE NOTE NURSING/CASE MANAGEMENT/SOCIAL WORK - NSDCPEPTSTRK_GEN_ALL_CORE
Call 911 for stroke/Need for follow up after discharge/Stroke education booklet/Risk factors for stroke/Prescribed medications/Stroke support groups for patients, families, and friends/Stroke warning signs and symptoms/Signs and symptoms of stroke

## 2019-05-27 NOTE — PROGRESS NOTE ADULT - PROBLEM SELECTOR PLAN 4
old   xarleto on hold for gi bleed
continue current management and present  medications
old   xarleto on hold for gi bleed

## 2019-05-27 NOTE — PROGRESS NOTE ADULT - PROVIDER SPECIALTY LIST ADULT
Cardiology
Gastroenterology
Heme/Onc
Hospitalist
Cardiology
Hospitalist

## 2019-05-27 NOTE — DISCHARGE NOTE NURSING/CASE MANAGEMENT/SOCIAL WORK - NSDCPEEMAIL_GEN_ALL_CORE
[facility] for Tobacco Control email [email or web address]

## 2019-05-27 NOTE — PROGRESS NOTE ADULT - PROBLEM SELECTOR PLAN 1
SERIAL H/H , PPI , IV FLUIDS , O2 SUPPLY ,seen by GI and EGD is performed - found to have gastritis and esophagitis .Xarelto stopped ,plavix is resumed
cbc daily  cont proton pump inhibitor  hold a/c  hold antiplatlets  may need colonoscopy if bleeding persists
cbc daily  cont proton pump inhibitor  hold a/c  plavix restarted  monitor for gi bleed
cbc daily  cont proton pump inhibitor  hold a/c  plavix restarted  monitor for gi bleed  diet as tolerated  no need for colonoscopy
SERIAL H/H , PPI , IV FLUIDS , O2 SUPPLY ,GI CONSULT CALLED , EGD PLANNED FOR AM , NPO JOSESITO
SERIAL H/H , PPI , IV FLUIDS , O2 SUPPLY ,GI CONSULT CALLED , EGD PLANNED FOR AM , NPO JOSESITO
SERIAL H/H , PPI , IV FLUIDS , O2 SUPPLY ,seen by GI and EGD is performed - found to have gastritis and esophagitis .Xarelto stopped ,plavix is resumed
s/p tranfusion   as per gi

## 2019-05-27 NOTE — PROGRESS NOTE ADULT - SUBJECTIVE AND OBJECTIVE BOX
Patient is a 63y old  Male who presents with a chief complaint of lower GIB (27 May 2019 10:10)       Pt is seen and examined  pt is awake and lying in bed/out of bed to chair  pt seems comfortable and denies any complaints at this time    HPI:  64 yo Male , [facility] resident with hx of H/O DVT (deep venous thrombosis ,nearly occlusive popliteal )on Xarelto   ,Hyperlipidemia  ,Hypertension and PVD (peripheral vascular disease) ,PAD ,LLE popliteal bypass ,s/p left 5th toe amputation ,CVA in 1987 with L hemiparesis and contractures ,hx of CAD , Quadriple bypass , left foot toe avulsion , s/p LLE gangrene and L AKA  .  p/w dark stools since this am ( as per nursing staff patient hasd a lot of melena in a diaper ) . Pt on xarelto, took last dose last night. Pt also took his plavix today. no weakness. no dizziness. no cp/sob/palp. no abd pain. no n/v. no recent fall / trauma. no agg/allev factors. No other inj or co Founfd to have anemia and blood transfusion of PRBCS is ordered ,seen by GI and EGD is recommended Palliative care consult requested ,to discuss advance directives and complete MOLST (23 May 2019 14:01)         ROS:  Negative except for:    MEDICATIONS  (STANDING):  atorvastatin 10 milliGRAM(s) Oral at bedtime  clopidogrel Tablet 75 milliGRAM(s) Oral daily  metoprolol tartrate 25 milliGRAM(s) Oral two times a day  nystatin Powder 1 Application(s) Topical every 8 hours  pantoprazole    Tablet 40 milliGRAM(s) Oral before breakfast  sodium chloride 0.9%. 1000 milliLiter(s) (50 mL/Hr) IV Continuous <Continuous>  sucralfate 1 Gram(s) Oral two times a day    MEDICATIONS  (PRN):  acetaminophen   Tablet .. 650 milliGRAM(s) Oral every 6 hours PRN Temp greater or equal to 38C (100.4F), Mild Pain (1 - 3)  oxyCODONE    5 mG/acetaminophen 325 mG 1 Tablet(s) Oral every 6 hours PRN Severe Pain (7 - 10)      Allergies    No Known Allergies    Intolerances        Vital Signs Last 24 Hrs  T(C): 37 (27 May 2019 07:48), Max: 37 (27 May 2019 07:48)  T(F): 98.6 (27 May 2019 07:48), Max: 98.6 (27 May 2019 07:48)  HR: 65 (27 May 2019 07:48) (65 - 89)  BP: 118/74 (27 May 2019 07:48) (92/58 - 124/81)  BP(mean): --  RR: 16 (27 May 2019 07:48) (16 - 17)  SpO2: 100% (27 May 2019 07:48) (96% - 100%)    PHYSICAL EXAM  General: adult in NAD  HEENT: clear oropharynx, anicteric sclera, pink conjunctiva  Neck: supple  CV: normal S1/S2 with no murmur rubs or gallops  Lungs: positive air movement b/l ant lungs,clear to auscultation, no wheezes, no rales  Abdomen: soft non-tender non-distended, no hepatosplenomegaly  Ext: no clubbing cyanosis or edema  Skin: no rashes and no petechiae  Neuro: alert and oriented X 4, no focal deficits  LABS:                          9.2    5.87  )-----------( 264      ( 27 May 2019 08:00 )             28.4         Mean Cell Volume : 88.8 fl  Mean Cell Hemoglobin : 28.8 pg  Mean Cell Hemoglobin Concentration : 32.4 gm/dL  Auto Neutrophil # : x  Auto Lymphocyte # : x  Auto Monocyte # : x  Auto Eosinophil # : x  Auto Basophil # : x  Auto Neutrophil % : x  Auto Lymphocyte % : x  Auto Monocyte % : x  Auto Eosinophil % : x  Auto Basophil % : x    Serial CBC's  05-27 @ 08:00  Hct-28.4 / Hgb-9.2 / Plat-264 / RBC-3.20 / WBC-5.87          Serial CBC's  05-26 @ 08:30  Hct-26.7 / Hgb-8.8 / Plat-259 / RBC-3.04 / WBC-5.07          Serial CBC's  05-25 @ 09:44  Hct-27.9 / Hgb-9.2 / Plat-237 / RBC-3.19 / WBC-5.90          Serial CBC's  05-24 @ 05:08  Hct-20.8 / Hgb-6.8 / Plat-246 / RBC-2.36 / WBC-5.55          Serial CBC's  05-23 @ 18:43  Hct-21.2 / Hgb-7.1 / Plat--- / RBC--- / WBC---            05-27    145  |  113<H>  |  9   ----------------------------<  88  4.2   |  27  |  0.68    Ca    8.0<L>      27 May 2019 08:00            Iron - Total Binding Capacity.: 182 ug/dL (05-24-19 @ 09:42)  Ferritin, Serum: 78 ng/mL (05-24-19 @ 09:42)  Vitamin B12, Serum: 466 pg/mL (05-24-19 @ 09:42)  Folate, Serum: 10.6 ng/mL (05-24-19 @ 09:42)  Reticulocyte Percent: 3.3 % (05-24-19 @ 05:08)                BLOOD SMEAR INTERPRETATION:       RADIOLOGY & ADDITIONAL STUDIES: Patient is a 63y old  Male who presents with a chief complaint of lower GIB (27 May 2019 10:10)       Pt is seen and examined  pt is awake and lying in bed  pt seems comfortable and denies any complaints at this time    HPI:  64 yo Male , [facility] resident with hx of H/O DVT (deep venous thrombosis ,nearly occlusive popliteal )on Xarelto   ,Hyperlipidemia  ,Hypertension and PVD (peripheral vascular disease) ,PAD ,LLE popliteal bypass ,s/p left 5th toe amputation ,CVA in 1987 with L hemiparesis and contractures ,hx of CAD , Quadriple bypass , left foot toe avulsion , s/p LLE gangrene and L AKA  .  p/w dark stools since this am ( as per nursing staff patient hasd a lot of melena in a diaper ) . Pt on xarelto, took last dose last night. Pt also took his plavix today. no weakness. no dizziness. no cp/sob/palp. no abd pain. no n/v. no recent fall / trauma. no agg/allev factors. No other inj or co Founfd to have anemia and blood transfusion of PRBCS is ordered ,seen by GI and EGD is recommended Palliative care consult requested ,to discuss advance directives and complete MOLST (23 May 2019 14:01)         ROS:  Negative except for:    MEDICATIONS  (STANDING):  atorvastatin 10 milliGRAM(s) Oral at bedtime  clopidogrel Tablet 75 milliGRAM(s) Oral daily  metoprolol tartrate 25 milliGRAM(s) Oral two times a day  nystatin Powder 1 Application(s) Topical every 8 hours  pantoprazole    Tablet 40 milliGRAM(s) Oral before breakfast  sodium chloride 0.9%. 1000 milliLiter(s) (50 mL/Hr) IV Continuous <Continuous>  sucralfate 1 Gram(s) Oral two times a day    MEDICATIONS  (PRN):  acetaminophen   Tablet .. 650 milliGRAM(s) Oral every 6 hours PRN Temp greater or equal to 38C (100.4F), Mild Pain (1 - 3)  oxyCODONE    5 mG/acetaminophen 325 mG 1 Tablet(s) Oral every 6 hours PRN Severe Pain (7 - 10)      Allergies    No Known Allergies    Intolerances        Vital Signs Last 24 Hrs  T(C): 37 (27 May 2019 07:48), Max: 37 (27 May 2019 07:48)  T(F): 98.6 (27 May 2019 07:48), Max: 98.6 (27 May 2019 07:48)  HR: 65 (27 May 2019 07:48) (65 - 89)  BP: 118/74 (27 May 2019 07:48) (92/58 - 124/81)  BP(mean): --  RR: 16 (27 May 2019 07:48) (16 - 17)  SpO2: 100% (27 May 2019 07:48) (96% - 100%)    PHYSICAL EXAM  General: adult in NAD  HEENT: clear oropharynx, anicteric sclera, pink conjunctiva  Neck: supple  CV: normal S1/S2 with no murmur rubs or gallops  Lungs: positive air movement b/l ant lungs,clear to auscultation, no wheezes, no rales  Abdomen: soft non-tender non-distended, no hepatosplenomegaly  Ext: no clubbing cyanosis or edema  Skin: no rashes and no petechiae  Neuro: alert and oriented X 4, no focal deficits  LABS:                          9.2    5.87  )-----------( 264      ( 27 May 2019 08:00 )             28.4         Mean Cell Volume : 88.8 fl  Mean Cell Hemoglobin : 28.8 pg  Mean Cell Hemoglobin Concentration : 32.4 gm/dL  Auto Neutrophil # : x  Auto Lymphocyte # : x  Auto Monocyte # : x  Auto Eosinophil # : x  Auto Basophil # : x  Auto Neutrophil % : x  Auto Lymphocyte % : x  Auto Monocyte % : x  Auto Eosinophil % : x  Auto Basophil % : x    Serial CBC's  05-27 @ 08:00  Hct-28.4 / Hgb-9.2 / Plat-264 / RBC-3.20 / WBC-5.87          Serial CBC's  05-26 @ 08:30  Hct-26.7 / Hgb-8.8 / Plat-259 / RBC-3.04 / WBC-5.07          Serial CBC's  05-25 @ 09:44  Hct-27.9 / Hgb-9.2 / Plat-237 / RBC-3.19 / WBC-5.90          Serial CBC's  05-24 @ 05:08  Hct-20.8 / Hgb-6.8 / Plat-246 / RBC-2.36 / WBC-5.55          Serial CBC's  05-23 @ 18:43  Hct-21.2 / Hgb-7.1 / Plat--- / RBC--- / WBC---            05-27    145  |  113<H>  |  9   ----------------------------<  88  4.2   |  27  |  0.68    Ca    8.0<L>      27 May 2019 08:00            Iron - Total Binding Capacity.: 182 ug/dL (05-24-19 @ 09:42)  Ferritin, Serum: 78 ng/mL (05-24-19 @ 09:42)  Vitamin B12, Serum: 466 pg/mL (05-24-19 @ 09:42)  Folate, Serum: 10.6 ng/mL (05-24-19 @ 09:42)  Reticulocyte Percent: 3.3 % (05-24-19 @ 05:08)
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
## 2019-05-27 NOTE — DISCHARGE NOTE PROVIDER - PROVIDER TOKENS
PROVIDER:[TOKEN:[[id number]],FOLLOWUP:[2 weeks]],PROVIDER:[TOKEN:[[id number]],FOLLOWUP:[1-3 days]]

## 2019-05-27 NOTE — PROGRESS NOTE ADULT - PROBLEM SELECTOR PLAN 5
on statin
continue current management and present  medications
on statin

## 2019-05-27 NOTE — PROGRESS NOTE ADULT - SUBJECTIVE AND OBJECTIVE BOX
[facility]  Brady Wasserman PA-C  [address] [place] [address]  [phone]      INTERVAL HPI/OVERNIGHT EVENTS:  no melena or hematochezia      MEDICATIONS  (STANDING):  atorvastatin 10 milliGRAM(s) Oral at bedtime  metoprolol tartrate 25 milliGRAM(s) Oral two times a day  nystatin Powder 1 Application(s) Topical every 8 hours  pantoprazole    Tablet 40 milliGRAM(s) Oral before breakfast  sodium chloride 0.9%. 1000 milliLiter(s) (50 mL/Hr) IV Continuous <Continuous>  sucralfate 1 Gram(s) Oral two times a day    MEDICATIONS  (PRN):  acetaminophen   Tablet .. 650 milliGRAM(s) Oral every 6 hours PRN Temp greater or equal to 38C (100.4F), Mild Pain (1 - 3)      Allergies    No Known Allergies    Intolerances        ROS:   General:  No wt loss, fevers, chills, night sweats, fatigue,   Eyes:  Good vision, no reported pain  ENT:  No sore throat, pain, runny nose, dysphagia  CV:  No pain, palpitations, hypo/hypertension  Resp:  No dyspnea, cough, tachypnea, wheezing  GI:  No pain, No nausea, No vomiting, No diarrhea, No constipation, No weight loss, No fever, No pruritis, No rectal bleeding, No tarry stools, No dysphagia,  :  No pain, bleeding, incontinence, nocturia  Muscle:  No pain, weakness  Neuro:  No weakness, tingling, memory problems  Psych:  No fatigue, insomnia, mood problems, depression  Endocrine:  No polyuria, polydipsia, cold/heat intolerance  Heme:  No petechiae, ecchymosis, easy bruisability  Skin:  No rash, tattoos, scars, edema      PHYSICAL EXAM:   Vital Signs:  Vital Signs Last 24 Hrs  T(C): 36.8 (25 May 2019 07:57), Max: 37 (25 May 2019 00:13)  T(F): 98.2 (25 May 2019 07:57), Max: 98.6 (25 May 2019 00:13)  HR: 69 (25 May 2019 07:57) (69 - 86)  BP: 108/69 (25 May 2019 07:57) (92/67 - 108/69)  BP(mean): --  RR: 18 (25 May 2019 07:57) (16 - 18)  SpO2: 100% (25 May 2019 07:57) (98% - 100%)  Daily     Daily     GENERAL:  Appears stated age, well-groomed, well-nourished, no distress  HEENT:  NC/AT,  conjunctivae clear and pink, no thyromegaly, nodules, adenopathy, no JVD, sclera -anicteric  CHEST:  Full & symmetric excursion, no increased effort, breath sounds clear  HEART:  Regular rhythm, S1, S2, no murmur/rub/S3/S4, no abdominal bruit, no edema  ABDOMEN:  Soft, non-tender, non-distended, normoactive bowel sounds,  no masses ,no hepato-splenomegaly, no signs of chronic liver disease  EXTEREMITIES:  no cyanosis,clubbing or edema  SKIN:  No rash/erythema/ecchymoses/petechiae/wounds/abscess/warm/dry  NEURO:  Alert, oriented, no asterixis, no tremor, no encephalopathy      LABS:                        9.2    5.90  )-----------( 237      ( 25 May 2019 09:44 )             27.9     05-25    144  |  112<H>  |  21  ----------------------------<  94  3.5   |  25  |  0.68    Ca    7.9<L>      25 May 2019 09:44    TPro  6.9  /  Alb  3.2<L>  /  TBili  0.2  /  DBili  x   /  AST  11<L>  /  ALT  11<L>  /  AlkPhos  92  05-23    PT/INR - ( 23 May 2019 12:02 )   PT: 18.5 sec;   INR: 1.60 ratio         PTT - ( 23 May 2019 12:02 )  PTT:48.0 sec  Urinalysis Basic - ( 23 May 2019 15:28 )    Color: Yellow / Appearance: Slightly Turbid / S.020 / pH: x  Gluc: x / Ketone: Negative  / Bili: Small / Urobili: Negative   Blood: x / Protein: 25 mg/dL / Nitrite: Positive   Leuk Esterase: Moderate / RBC: 0-2 /HPF / WBC >50   Sq Epi: x / Non Sq Epi: Occasional / Bacteria: Many        RADIOLOGY & ADDITIONAL TESTS:

## 2019-05-27 NOTE — PROGRESS NOTE ADULT - PROBLEM SELECTOR PLAN 3
no angina
H/H is stable ,case d/w Dr Morocho , Dr. Stanley and Dr Auguste >resume plavix ,stop xarelto ,No DVT on leg doppler
H/H is stable ,case d/w Dr Morocho , Dr. Stanley and Dr Auguste >resume plavix ,stop xarelto ,No DVT on leg doppler
HOLD XARELTO , SERIAL H/H , VASCULAR SX & HEM INPUT REG OAC PLAN ,LEG DOPPLER IS PENDING
HOLD XARELTO , SERIAL H/H , VASCULAR SX & HEM INPUT REG OAC PLAN ,LEG DOPPLER IS PENDING
no angina

## 2019-05-27 NOTE — DISCHARGE NOTE PROVIDER - NSDCCPCAREPLAN_GEN_ALL_CORE_FT
PRINCIPAL DISCHARGE DIAGNOSIS  Diagnosis: Gastrointestinal hemorrhage, unspecified gastrointestinal hemorrhage type  Assessment and Plan of Treatment: Gastrointestinal hemorrhage, unspecified gastrointestinal hemorrhage type      SECONDARY DISCHARGE DIAGNOSES  Diagnosis: Anemia, unspecified type  Assessment and Plan of Treatment: Anemia, unspecified type    Diagnosis: Hypertension  Assessment and Plan of Treatment: Hypertension    Diagnosis: ASHD (arteriosclerotic heart disease)  Assessment and Plan of Treatment: ASHD (arteriosclerotic heart disease)    Diagnosis: CVA (cerebral vascular accident)  Assessment and Plan of Treatment: CVA (cerebral vascular accident)    Diagnosis: Hyperlipidemia  Assessment and Plan of Treatment: Hyperlipidemia    Diagnosis: Prophylactic measure  Assessment and Plan of Treatment: Prophylactic measure

## 2019-05-27 NOTE — PROGRESS NOTE ADULT - PROBLEM SELECTOR PROBLEM 6
PVD (peripheral vascular disease)
CVA (cerebral vascular accident)
PVD (peripheral vascular disease)

## 2019-05-27 NOTE — PROGRESS NOTE ADULT - PROBLEM SELECTOR PLAN 2
old
SERIAL H/H ,TRANSFUSE PRBCS PRN , GI AND HEMONC FOLLOWUP seen by GI and EGD is performed - found to have gastritis and esophagitis .Xarelto stopped ,plavix is resumed
SERIAL H/H ,TRANSFUSE PRBCS PRN , GI AND HEMONC FOLLOWUP
SERIAL H/H ,TRANSFUSE PRBCS PRN , GI AND HEMONC FOLLOWUP
SERIAL H/H ,TRANSFUSE PRBCS PRN , GI AND HEMONC FOLLOWUP seen by GI and EGD is performed - found to have gastritis and esophagitis .Xarelto stopped ,plavix is resumed
old

## 2019-05-27 NOTE — PROGRESS NOTE ADULT - ATTENDING COMMENTS
62 yo Male , [facility] resident with hx of H/O DVT (deep venous thrombosis ,nearly occlusive popliteal )on Xarelto   ,Hyperlipidemia  ,Hypertension and PVD (peripheral vascular disease) ,PAD ,LLE popliteal bypass ,s/p left 5th toe amputation ,CVA in 1987 with L hemiparesis and contractures ,hx of CAD , Quadriple bypass , left foot toe avulsion , s/p LLE gangrene and L AKA  .  p/w dark stools since this am ( as per nursing staff patient hasd a lot of melena in a diaper ) . Pt on xarelto, took last dose last night. Pt also took his plavix today. no weakness. no dizziness. no cp/sob/palp. no abd pain. no n/v. no recent fall / trauma. no agg/allev factors. No other inj or co Founfd to have anemia and blood transfusion of PRBCS is ordered ,seen by GI and EGD is performed - found to have gastritis and esophagitis .Xarelto stopped ,plavix is resumed   .
64 yo Male , [facility] resident with hx of H/O DVT (deep venous thrombosis ,nearly occlusive popliteal )on Xarelto   ,Hyperlipidemia  ,Hypertension and PVD (peripheral vascular disease) ,PAD ,LLE popliteal bypass ,s/p left 5th toe amputation ,CVA in 1987 with L hemiparesis and contractures ,hx of CAD , Quadriple bypass , left foot toe avulsion , s/p LLE gangrene and L AKA  .  p/w dark stools since this am ( as per nursing staff patient hasd a lot of melena in a diaper ) . Pt on xarelto, took last dose last night. Pt also took his plavix today. no weakness. no dizziness. no cp/sob/palp. no abd pain. no n/v. no recent fall / trauma. no agg/allev factors. No other inj or co Founfd to have anemia and blood transfusion of PRBCS is ordered ,seen by GI and EGD is recommended Palliative care consult requested ,to discuss advance directives and complete MOLST MEDICALLY OPTIMIZED FOR ENDOSCOPY AND CLEARED BY CARDIOLOGIST .
64 yo Male , [facility] resident with hx of H/O DVT (deep venous thrombosis ,nearly occlusive popliteal )on Xarelto   ,Hyperlipidemia  ,Hypertension and PVD (peripheral vascular disease) ,PAD ,LLE popliteal bypass ,s/p left 5th toe amputation ,CVA in 1987 with L hemiparesis and contractures ,hx of CAD , Quadriple bypass , left foot toe avulsion , s/p LLE gangrene and L AKA  .  p/w dark stools since this am ( as per nursing staff patient hasd a lot of melena in a diaper ) . Pt on xarelto, took last dose last night. Pt also took his plavix today. no weakness. no dizziness. no cp/sob/palp. no abd pain. no n/v. no recent fall / trauma. no agg/allev factors. No other inj or co Founfd to have anemia and blood transfusion of PRBCS is ordered ,seen by GI and EGD is recommended Palliative care consult requested ,to discuss advance directives and complete MOLST MEDICALLY OPTIMIZED FOR ENDOSCOPY AND CLEARED BY CARDIOLOGIST .
64 yo Male , [facility] resident with hx of H/O DVT (deep venous thrombosis ,nearly occlusive popliteal )on Xarelto   ,Hyperlipidemia  ,Hypertension and PVD (peripheral vascular disease) ,PAD ,LLE popliteal bypass ,s/p left 5th toe amputation ,CVA in 1987 with L hemiparesis and contractures ,hx of CAD , Quadriple bypass , left foot toe avulsion , s/p LLE gangrene and L AKA  .  p/w dark stools since this am ( as per nursing staff patient hasd a lot of melena in a diaper ) . Pt on xarelto, took last dose last night. Pt also took his plavix today. no weakness. no dizziness. no cp/sob/palp. no abd pain. no n/v. no recent fall / trauma. no agg/allev factors. No other inj or co Founfd to have anemia and blood transfusion of PRBCS is ordered ,seen by GI and EGD is performed - found to have gastritis and esophagitis .Xarelto stopped ,plavix is resumed   .

## 2019-05-27 NOTE — PROGRESS NOTE ADULT - NSHPATTENDINGPLANDISCUSS_GEN_ALL_CORE
med staff, Dr Nance, Dr Villarreal,V  ,Dr Lua , [facility] med staff ,family , on call Kasey
med staff , Dr Bustillos , Dr Cam , [facility] med staff ,family ,[facility] RN
med staff , Dr Bustillos , Dr Nance, Dr Villarreal,V  , [facility] med staff ,family ,[facility] RN
med staff, Dr Nance, Dr Villarreal,V  ,Dr Lua , [facility] med staff ,family , on call Kasey

## 2019-05-27 NOTE — PROGRESS NOTE ADULT - PROBLEM SELECTOR PROBLEM 5
Hyperlipidemia
PVD (peripheral vascular disease)
Hyperlipidemia
PVD (peripheral vascular disease)

## 2019-05-27 NOTE — PROGRESS NOTE ADULT - REASON FOR ADMISSION
lower GIB

## 2019-05-27 NOTE — PROGRESS NOTE ADULT - PROBLEM SELECTOR PROBLEM 1
Gastrointestinal hemorrhage, unspecified gastrointestinal hemorrhage type

## 2019-05-27 NOTE — PROGRESS NOTE ADULT - SUBJECTIVE AND OBJECTIVE BOX
Patient is a 63y Male with a known history of :  PVC (premature ventricular contraction) (I49.3)  Hyperlipidemia (E78.5)  ASHD (arteriosclerotic heart disease) (I25.10)  Prophylactic measure (Z29.9)  CVA (cerebral vascular accident) (I63.9)  PVD (peripheral vascular disease) (I73.9)  Hypertension (I10)  DVT (deep venous thrombosis) (I82.409)  Anemia, unspecified type (D64.9)  Gastrointestinal hemorrhage, unspecified gastrointestinal hemorrhage type (K92.2)    HPI:  64 yo Male , [facility] resident with hx of H/O DVT (deep venous thrombosis ,nearly occlusive popliteal )on Xarelto   ,Hyperlipidemia  ,Hypertension and PVD (peripheral vascular disease) ,PAD ,LLE popliteal bypass ,s/p left 5th toe amputation ,CVA in 1987 with L hemiparesis and contractures ,hx of CAD , Quadriple bypass , left foot toe avulsion , s/p LLE gangrene and L AKA  .  p/w dark stools since this am ( as per nursing staff patient hasd a lot of melena in a diaper ) . Pt on xarelto, took last dose last night. Pt also took his plavix today. no weakness. no dizziness. no cp/sob/palp. no abd pain. no n/v. no recent fall / trauma. no agg/allev factors. No other inj or co Founfd to have anemia and blood transfusion of PRBCS is ordered ,seen by GI and EGD is recommended Palliative care consult requested ,to discuss advance directives and complete MOLST (23 May 2019 14:01)      REVIEW OF SYSTEMS:    CONSTITUTIONAL: No fever, weight loss, or fatigue  EYES: No eye pain, visual disturbances, or discharge  ENMT:  No difficulty hearing, tinnitus, vertigo; No sinus or throat pain  NECK: No pain or stiffness  BREASTS: No pain, masses, or nipple discharge  RESPIRATORY: No cough, wheezing, chills or hemoptysis; No shortness of breath  CARDIOVASCULAR: No chest pain, palpitations, dizziness, or leg swelling  GASTROINTESTINAL: No abdominal or epigastric pain. No nausea, vomiting, or hematemesis; No diarrhea or constipation. No melena or hematochezia.  GENITOURINARY: No dysuria, frequency, hematuria, or incontinence  NEUROLOGICAL: No headaches, memory loss, loss of strength, numbness, or tremors  SKIN: No itching, burning, rashes, or lesions   LYMPH NODES: No enlarged glands  ENDOCRINE: No heat or cold intolerance; No hair loss  MUSCULOSKELETAL: No joint pain or swelling; No muscle, back, or extremity pain  PSYCHIATRIC: No depression, anxiety, mood swings, or difficulty sleeping  HEME/LYMPH: No easy bruising, or bleeding gums  ALLERGY AND IMMUNOLOGIC: No hives or eczema    MEDICATIONS  (STANDING):  atorvastatin 10 milliGRAM(s) Oral at bedtime  clopidogrel Tablet 75 milliGRAM(s) Oral daily  metoprolol tartrate 25 milliGRAM(s) Oral two times a day  nystatin Powder 1 Application(s) Topical every 8 hours  pantoprazole    Tablet 40 milliGRAM(s) Oral before breakfast  sodium chloride 0.9%. 1000 milliLiter(s) (50 mL/Hr) IV Continuous <Continuous>  sucralfate 1 Gram(s) Oral two times a day    MEDICATIONS  (PRN):  acetaminophen   Tablet .. 650 milliGRAM(s) Oral every 6 hours PRN Temp greater or equal to 38C (100.4F), Mild Pain (1 - 3)  oxyCODONE    5 mG/acetaminophen 325 mG 1 Tablet(s) Oral every 6 hours PRN Severe Pain (7 - 10)      ALLERGIES: No Known Allergies      FAMILY HISTORY:  Family history of hypertension      PHYSICAL EXAMINATION:  -----------------------------  T(C): 37 (05-27-19 @ 07:48), Max: 37 (05-27-19 @ 07:48)  HR: 65 (05-27-19 @ 07:48) (63 - 89)  BP: 118/74 (05-27-19 @ 07:48) (92/58 - 124/81)  RR: 16 (05-27-19 @ 07:48) (16 - 18)  SpO2: 100% (05-27-19 @ 07:48) (96% - 100%)  Wt(kg): --    05-26 @ 07:01  -  05-27 @ 07:00  --------------------------------------------------------  IN:    sodium chloride 0.9%.: 1000 mL  Total IN: 1000 mL    OUT:    Voided: 400 mL  Total OUT: 400 mL    Total NET: 600 mL            Constitutional: well developed, normal appearance, well groomed, well nourished, no deformities and no acute distress.   Eyes: the conjunctiva exhibited no abnormalities and the eyelids demonstrated no xanthelasmas.   HEENT: normal oral mucosa, no oral pallor and no oral cyanosis.   Neck: normal jugular venous A waves present, normal jugular venous V waves present and no jugular venous cabrera A waves.   Pulmonary: no respiratory distress, normal respiratory rhythm and effort, no accessory muscle use and lungs were clear to auscultation bilaterally.   Cardiovascular: heart rate and rhythm were normal, normal S1 and S2 and no murmur, gallop, rub, heave or thrill are present.   Abdomen: soft, non-tender, no hepato-splenomegaly and no abdominal mass palpated.   Musculoskeletal: the gait could not be assessed..   Extremities: no clubbing of the fingernails, no localized cyanosis, no petechial hemorrhages and no ischemic changes.   Skin: normal skin color and pigmentation, no rash, no venous stasis, no skin lesions, no skin ulcer and no xanthoma was observed.   Psychiatric: oriented to person, place, and time, the affect was normal, the mood was normal and not feeling anxious.     LABS:   --------  05-26    146<H>  |  114<H>  |  13  ----------------------------<  84  3.3<L>   |  25  |  0.62    Ca    7.9<L>      26 May 2019 08:30                           9.2    5.87  )-----------( 264      ( 27 May 2019 08:00 )             28.4                 RADIOLOGY:  -----------------        ECG:

## 2019-05-27 NOTE — PROGRESS NOTE ADULT - PROBLEM SELECTOR PROBLEM 3
ASHD (arteriosclerotic heart disease)
DVT (deep venous thrombosis)
ASHD (arteriosclerotic heart disease)
DVT (deep venous thrombosis)

## 2019-05-27 NOTE — DISCHARGE NOTE PROVIDER - CARE PROVIDER_API CALL
Varinder Nance (DO)  Internal Medicine  [address] [place] [address]  Phone: [phone]  Fax: [phone]  Follow Up Time: 2 weeks    Eda Oropeza (DO)  Internal Medicine  [address] [place] [address]  Phone: [phone]  Fax: [phone]  Follow Up Time: 1-3 days

## 2019-05-27 NOTE — PROGRESS NOTE ADULT - PROBLEM SELECTOR PLAN 6
s/p amputation
HOLD PLAVIX
s/p amputation

## 2019-07-17 NOTE — DISCHARGE NOTE ADULT - NURSING SECTION COMPLETE
Detail Level: Detailed Hide Additional Notes?: No Patient/Caregiver provided printed discharge information.

## 2019-10-21 NOTE — DISCHARGE NOTE PROVIDER - HOSPITAL COURSE
62 yo Male , [facility] resident with hx of H/O DVT (deep venous thrombosis ,nearly occlusive popliteal )on Xarelto   ,Hyperlipidemia  ,Hypertension and PVD (peripheral vascular disease) ,PAD ,LLE popliteal bypass ,s/p left 5th toe amputation ,CVA in 1987 with L hemiparesis and contractures ,hx of CAD , Quadriple bypass , left foot toe avulsion , s/p LLE gangrene and L AKA  .  p/w dark stools since this am ( as per nursing staff patient hasd a lot of melena in a diaper ) . Pt on xarelto, took last dose last night. Pt also took his plavix today. no weakness. no dizziness. no cp/sob/palp. no abd pain. no n/v. no recent fall / trauma. no agg/allev factors. No other inj or co Founfd to have anemia and blood transfusion of PRBCS is ordered ,seen by GI and EGD is recommended Palliative care consult requested ,to discuss advance directives and complete MOLST     ·  Problem: Gastrointestinal hemorrhage, unspecified gastrointestinal hemorrhage type.  Plan: SERIAL H/H , PPI , IV FLUIDS , O2 SUPPLY ,seen by GI and EGD is performed - found to have gastritis and esophagitis .Xarelto stopped ,plavix is resumed.     ·  Problem: Anemia, unspecified type.  Plan: SERIAL H/H ,TRANSFUSE PRBCS PRN , GI AND HEMONC FOLLOWUP seen by GI and EGD is performed - found to have gastritis and esophagitis .Xarelto stopped ,plavix is resumed.    ·  Problem: DVT (deep venous thrombosis).  Plan: H/H is stable ,case d/w Dr Morocho , Dr. Stanley and Dr Auguste >resume plavix ,stop xarelto ,No DVT on leg doppler.     ·  Problem: Hypertension.  Plan: continue current management and present  medications.     ·  Problem: PVD (peripheral vascular disease).  Plan: continue current management and present  medications.     Problem: CVA (cerebral vascular accident). Plan: HOLD PLAVIX.    ·  Problem: Prophylactic measure.  Plan: Gastrointestinal stress ulcer prophylaxis and DVT prophylaxis administrated.
He lives with his wife.  He used to smoke 3-4 cigarettes per day sporadically for years and quit in 2016.  He denies drug or alcohol use.

## 2019-11-07 ENCOUNTER — EMERGENCY (EMERGENCY)
Facility: HOSPITAL | Age: 64
LOS: 1 days | Discharge: HOME | End: 2019-11-07
Attending: EMERGENCY MEDICINE | Admitting: EMERGENCY MEDICINE
Payer: MEDICARE

## 2019-11-07 VITALS
RESPIRATION RATE: 16 BRPM | HEART RATE: 59 BPM | TEMPERATURE: 99 F | OXYGEN SATURATION: 98 % | DIASTOLIC BLOOD PRESSURE: 71 MMHG | SYSTOLIC BLOOD PRESSURE: 118 MMHG

## 2019-11-07 VITALS
HEART RATE: 50 BPM | OXYGEN SATURATION: 98 % | DIASTOLIC BLOOD PRESSURE: 75 MMHG | RESPIRATION RATE: 16 BRPM | SYSTOLIC BLOOD PRESSURE: 111 MMHG | TEMPERATURE: 98 F

## 2019-11-07 DIAGNOSIS — Z95.1 PRESENCE OF AORTOCORONARY BYPASS GRAFT: Chronic | ICD-10-CM

## 2019-11-07 DIAGNOSIS — Z98.890 OTHER SPECIFIED POSTPROCEDURAL STATES: Chronic | ICD-10-CM

## 2019-11-07 DIAGNOSIS — Z89.512 ACQUIRED ABSENCE OF LEFT LEG BELOW KNEE: Chronic | ICD-10-CM

## 2019-11-07 PROCEDURE — 73110 X-RAY EXAM OF WRIST: CPT | Mod: 26,LT

## 2019-11-07 PROCEDURE — 73110 X-RAY EXAM OF WRIST: CPT

## 2019-11-07 PROCEDURE — 99284 EMERGENCY DEPT VISIT MOD MDM: CPT | Mod: 25

## 2019-11-07 PROCEDURE — 73130 X-RAY EXAM OF HAND: CPT | Mod: 26,LT

## 2019-11-07 PROCEDURE — 29125 APPL SHORT ARM SPLINT STATIC: CPT

## 2019-11-07 PROCEDURE — 29125 APPL SHORT ARM SPLINT STATIC: CPT | Mod: LT

## 2019-11-07 PROCEDURE — 99283 EMERGENCY DEPT VISIT LOW MDM: CPT | Mod: 25

## 2019-11-07 PROCEDURE — 73130 X-RAY EXAM OF HAND: CPT

## 2019-11-07 NOTE — ED ADULT NURSE NOTE - OBJECTIVE STATEMENT
Pt sent from [facility] for c/c of L hand pain. Per patient awoke this morning w/ hand pain, denies trauma to hand or fall. Pt sent from SNF for fracture to L hand 5th digit and "swelling, cool to touch". Pt endorses pain to hand, extremity warm, appropriate color for ethnicity, pain with movement, + sensation, cap refill less than 2

## 2019-11-07 NOTE — ED ADULT NURSE REASSESSMENT NOTE - NS ED NURSE REASSESS COMMENT FT1
pt reavaluated and vital signs stable splint applied to hand as ordered report given to TRA Tamayo for transfer back to facility awaiting EMS transport

## 2019-11-07 NOTE — ED PROVIDER NOTE - PROGRESS NOTE DETAILS
cannot exclude nondisplaced hairline fx base of left 5th metacarpal, splint applied, given information for Dr Vaughn, call tomorrow to arrange follow up , copy of xrays given on cd.

## 2019-11-07 NOTE — ED PROVIDER NOTE - ATTENDING CONTRIBUTION TO CARE
Pt seen and examined and d/w PA.  agree with a and p. pt is a 63 yo male sent from nursing home with left hand pain. pt on exam with bruising and ttp to left 5th base of metacarpal.  no other trauma. pt on xray with no obvious fx but splint applied and fu ortho

## 2019-11-07 NOTE — ED PROVIDER NOTE - UPPER EXTREMITY EXAM, LEFT
left 5th metacarpal, faint bruise noted, no open wound, nvi, left wrist pain with flexion, extension, nvi , from, no deformity no swelling, fingers no pain with rom, no deformity/BRUISING

## 2019-11-07 NOTE — ED ADULT NURSE NOTE - CHPI ED NUR SYMPTOMS NEG
no difficulty bearing weight/no fever/no bruising/no tingling/no abrasion/no numbness/no back pain/no deformity/no stiffness

## 2019-11-07 NOTE — ED PROVIDER NOTE - PATIENT PORTAL LINK FT
You can access the FollowMyHealth Patient Portal offered by [facility] by registering at the following website: [email or web address]. By joining [employer]’s FollowMyHealth portal, you will also be able to view your health information using other applications (apps) compatible with our system.

## 2019-11-07 NOTE — ED PROVIDER NOTE - CLINICAL SUMMARY MEDICAL DECISION MAKING FREE TEXT BOX
left hand pain, left wrist pain with rom,  hx of neurological deficity, left upper and lle, faint bruise noted left 5th metacarpal, will obtain xrays, r/o fx
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
## 2019-11-19 PROBLEM — Z00.00 ENCOUNTER FOR PREVENTIVE HEALTH EXAMINATION: Status: ACTIVE | Noted: 2019-11-19

## 2019-11-25 ENCOUNTER — APPOINTMENT (OUTPATIENT)
Dept: ORTHOPEDIC SURGERY | Facility: CLINIC | Age: 64
End: 2019-11-25

## 2020-03-09 NOTE — ED PROVIDER NOTE - INTERNATIONAL TRAVEL
Patient is dropping off Disability forms to be completed forms and RAE in mailbox please contact patient when ready to .   Ph:[phone]   No

## 2020-08-24 NOTE — ED ADULT NURSE NOTE - NSSUSCREENINGQ3_ED_ALL_ED
Emergency Department  [address] [facility] [address]  Phone:  [phone]  Fax:  [phone]                                    Mar Velásquez   MRN: [id number]    Department:   Emergency Department   Date of Visit:  8/24/2020           After Visit Summary Signature Page    I have received my discharge instructions, and my questions have been answered. I have discussed any challenges I see with this plan with the nurse or doctor.    ..........................................................................................................................................  Patient/Patient Representative Signature      ..........................................................................................................................................  Patient Representative Print Name and Relationship to Patient    ..................................................               ................................................  Date                                   Time    ..........................................................................................................................................  Reviewed by Signature/Title    ...................................................              ..............................................  Date                                               Time          22EPIC Rev 08/18        No

## 2021-02-15 NOTE — DISCHARGE NOTE NURSING/CASE MANAGEMENT/SOCIAL WORK - NSDCPEFALRISK_GEN_ALL_CORE
Patient information on fall and injury prevention You can access the FollowMyHealth Patient Portal offered by [facility] by registering at the following website: [email or web address]. By joining [employer]’s FollowMyHealth portal, you will also be able to view your health information using other applications (apps) compatible with our system.

## 2021-05-11 NOTE — ED PROVIDER NOTE - CONSTITUTIONAL, MLM
Over the last 2 weeks, how often have you been bothered by the following problems?          PHQ2 Score: 1  PHQ2 Score Interpretation: No further screening needed  1. Little interest or pleasure in activity?: 1  2. Feeling down, depressed, or hopeless?: 0       70-year-old female comes in for follow-up on her depression.  She is here with her daughter again  At her last visit her PHQ 9 was 18. Today her PHQ-2 is 1.  At her last visit her Effexor was increased from 150 mg daily to 225 mg daily.  She notes that after week or 2 she noticed a significant improvement in her symptoms.  She is sleeping better, her eating habits are better and she is actually eating food again and having a significant improvement in her diabetes.  She checks her blood sugars 4 times daily her blood sugars range from 140 up to 222. She is beginning to see friends again.  She has an interest in doing things.  She is experiencing a sense of guilt when she lays her  at home alone because he want to do everything with her.    Regarding her diabetes she is eating better.  Her blood sugars are better and check 4 times daily.  No chest pain, shortness of breath or swelling in her legs.  She is taking her medication as prescribed.    Her concern today is that she has a new tremor.  She and her daughter notes that over the past week she has had a tremor I recently she has been spilling coffee.  No known family history of tremor.  Her only medication at is likely contribute is gabapentin but she is not taking that regularly.    Current Outpatient Medications   Medication   • venlafaxine XR (EFFEXOR XR) 75 MG 24 hr capsule   • omeprazole (PrilOSEC) 20 MG capsule   • FREESTYLE LITE test strip   • ondansetron (Zofran ODT) 4 MG disintegrating tablet   • venlafaxine XR (EFFEXOR XR) 150 MG 24 hr capsule   • glimepiride (AMARYL) 2 MG tablet   • atorvastatin (LIPITOR) 40 MG tablet   • insulin regular, human, (HumuLIN R) 100 UNIT/ML injectable solution    • insulin NPH (NovoLIN N) 100 UNIT/ML injectable suspension   • Insulin Syringe-Needle U-100 (Safety Insulin Syringes) 30G X 1/2\" 1 ML Misc   • losartan-hydrochlorothiazide (HYZAAR) 100-25 MG per tablet   • Spacer/Aero-Holding Chambers (AEROCHAMBER)   • Probiotic Product (PROBIOTIC & ACIDOPHILUS EX ST) Cap   • Cholecalciferol (VITAMIN D3) 2000 UNIT OR CAPS   • gabapentin (NEURONTIN) 600 MG tablet   • metFORMIN (GLUCOPHAGE-XR) 750 MG 24 hr tablet     No current facility-administered medications for this visit.     PHYSICAL EXAM:  Blood pressure 136/72, pulse 98, weight 97.5 kg, SpO2 93 %.    GENERAL:  Patient alert, appropriate in no apparent distress.    ASSESSMENT/PLAN:  1. Tremor  Discussed treating it prior to being seen by Neurology but she is willing to wait until she is seen and evaluated.  Reminded to be safe with hot liquids  - SERVICE TO NEUROLOGY    2. Type 2 diabetes mellitus without complication, with long-term current use of insulin (CMS/HCC)  nop change in meds  Will have lab when due next week and plan follow up with me in 3 months  - GLYCOHEMOGLOBIN; Future    3. Moderate episode of recurrent major depressive disorder (CMS/HCC)  Continue Effexor 225 daily.  Patient is on the list get an appointment to be seen by behavioral health but they are far behind.      Sleep better, eating better, seeing friends,   Tremors of hand- no family history   Glu 140-222   normal... Well appearing, well nourished, awake, alert, oriented to person, place, time/situation and in mild distress from pain.

## 2021-07-13 NOTE — ED PROVIDER NOTE - AGGRAVATING FACTORS
RNCC outreach to MIGUEL Santoyo from [facility]. Pt at dialysis today. Left VM for Natanael to see if palliative care assessment was ever done.      Pt still undergoing tx for larynx cancer. Using TPN. Not eating much, dialysis is giving fluids but the gets fluid overloaded easily.  Radiation on hold this week d/t increased weakness.      Will try calling pt on Friday to see if she needs anything and await return call from [facility].     
none

## 2021-08-31 NOTE — ED PROVIDER NOTE - DR. NAME
Patient wants to know if she can get an antibiotic for UTI sent to her pharmacy, she cannot come in to be seen.   Natasha

## 2022-10-20 NOTE — PATIENT PROFILE ADULT - NSPRONUTRITIONRISK_GEN_A_NUR
No indicators present Qbrexza Counseling:  I discussed with the patient the risks of Qbrexza including but not limited to headache, mydriasis, blurred vision, dry eyes, nasal dryness, dry mouth, dry throat, dry skin, urinary hesitation, and constipation.  Local skin reactions including erythema, burning, stinging, and itching can also occur.

## 2022-11-01 NOTE — ED ADULT TRIAGE NOTE - MEANS OF ARRIVAL
Subjective   Patient ID: Eboni is a 89 year old female.    Chief Complaint   Patient presents with   • Follow-up     89 Yo    Bf   With   H/o    chf  /afib    /anemia   Pt     Is   Here for   F/u   Doing  Well   Has   Good  Appetite    Has    No chest  Pain   Or sob    Has   Le   Edema     Pt  Saw hematology    Yesterday   Appreciate    Consultation      Eboni has a past medical history of Atrial fibrillation (CMS/HCC), Chronic kidney disease, Congestive cardiac failure (CMS/HCC), Essential (primary) hypertension, and Stroke (CMS/HCC).  Eboni has Aortic atherosclerosis (CMS/HCC); Atrial fibrillation (CMS/HCC); Essential hypertension; Cardiomyopathy (CMS/HCC); Congestive heart failure (CMS/HCC); Rhinitis; Bilateral lower extremity edema; Hospital discharge follow-up; Drug-induced constipation; Left thyroid nodule; Pulmonary nodules; Other specified glaucoma; Headache; Thickened nails; Cataract of right eye; Long term (current) use of anticoagulants; Anemia; Chronic pain of both shoulders; Osteoarthritis of both shoulders; Irritant contact dermatitis due to cosmetics; Encounter for Medicare annual wellness exam; Chronic low back pain without sciatica; Itchy skin; Dermatitis; Vitamin D deficiency, unspecified; Sprain of right ankle; Gait disturbance; History of recent fall; Edema; Impacted cerumen; Hypercoagulable state due to persistent atrial fibrillation (CMS/HCC); Stage 3b chronic kidney disease (CMS/HCC); Viral URI with cough; Iron deficiency anemia; and Other decreased white blood cell count on their problem list.  Eboni has a past surgical history that includes Hysterectomy; Thyroid cyst excision; Appendectomy; and Cholecystectomy.  Her family history includes Early death in her father; Patient is unaware of any medical problems in her brother; Pneumonia in her mother.  Eboni reports that she has never smoked. She has never used smokeless tobacco. She reports that she does not drink alcohol and does  not use drugs.  Eboni has a current medication list which includes the following prescription(s): metoprolol succinate, xarelto, ferrous sulfate, hydralazine, furosemide, isosorbide dinitrate, and mometasone.  Eboni   Current Outpatient Medications   Medication Sig Dispense Refill   • metoPROLOL succinate (TOPROL-XL) 25 MG 24 hr tablet Take 0.5 tablets by mouth daily. 45 tablet 1   • Xarelto 15 MG Tab Take 1 tablet by mouth daily (with dinner). 90 tablet 0   • ferrous sulfate 325 (65 FE) MG tablet Take 1 tablet by mouth daily (with breakfast). 30 tablet 2   • hydrALAZINE (APRESOLINE) 25 MG tablet Take 1 tablet by mouth 2 times daily. 180 tablet 0   • furosemide (LASIX) 40 MG tablet Take 1 tablet by mouth daily. 90 tablet 0   • isosorbide dinitrate (ISORDIL) 20 MG tablet Take 1 tablet by mouth 2 times daily. 180 tablet 1   • mometasone (ELOCON) 0.1 % cream        No current facility-administered medications for this visit.     Eboni is allergic to sulfa antibiotics, amoxicillin-pot clavulanate, and lisinopril.    Review of Systems   Constitutional: Negative for appetite change and fatigue.   Respiratory: Negative for shortness of breath.    Cardiovascular: Positive for leg swelling. Negative for chest pain.   Psychiatric/Behavioral: Negative.    /81 (BP Location: LUE - Left upper extremity, Patient Position: Sitting, Cuff Size: Small Adult)   Pulse 63   Resp 16   Ht 5' 3\" (1.6 m)   Wt 56.7 kg (125 lb)   LMP  (LMP Unknown)   BMI 22.14 kg/m²   BSA 1.58 m²   Objective   Physical Exam  Vitals and nursing note reviewed.   Constitutional:       General: She is not in acute distress.     Appearance: Normal appearance. She is obese. She is not ill-appearing, toxic-appearing or diaphoretic.   HENT:      Head: Normocephalic and atraumatic.      Neck: Normal range of motion and neck supple. No rigidity or tenderness.   Eyes:      General: No scleral icterus.        Right eye: No discharge.         Left eye:  No discharge.      Conjunctiva/sclera: Conjunctivae normal.   Cardiovascular:      Rate and Rhythm: Normal rate. Rhythm irregular.      Heart sounds: Normal heart sounds. No murmur heard.  Pulmonary:      Effort: Pulmonary effort is normal. No respiratory distress.      Breath sounds: Normal breath sounds. No wheezing, rhonchi or rales.   Abdominal:      General: There is no distension.      Palpations: Abdomen is soft.      Tenderness: There is no abdominal tenderness.   Musculoskeletal:      Right lower leg: Edema present.      Left lower leg: Edema present.   Lymphadenopathy:      Cervical: No cervical adenopathy.   Skin:     General: Skin is warm and dry.   Neurological:      General: No focal deficit present.      Mental Status: She is alert. Mental status is at baseline.   Psychiatric:         Mood and Affect: Mood normal.         Behavior: Behavior normal.         Thought Content: Thought content normal.         Judgment: Judgment normal.       Assessment   Problem List Items Addressed This Visit        Cardiac and Vasculature    Atrial fibrillation (CMS/HCC)     Monitor: The problem is stable.  Evaluation: No labs/tests required today.  Assessment/Treatment:  Continue current treatment/monitoring regimen.         Congestive heart failure (CMS/HCC) - Primary     Monitor: The problem is stable.  Evaluation: No labs/tests required today.  Assessment/Treatment:  Continue current treatment/monitoring regimen.            Hematology and Neoplasia    Iron deficiency anemia     Cont    Iron   Therapy              Symptoms and Signs    Bilateral lower extremity edema       Cont    Diuretic      Therapy  And   Use    Compression  Stockings                stretcher

## 2023-04-27 NOTE — PRE-OP CHECKLIST - STERILIZATION AFFIRMATION
impairments found/predicted duration of therapy intervention/anticipated discharge recommendation n/a

## 2023-06-15 NOTE — INPATIENT CERTIFICATION FOR MEDICARE PATIENTS - RISKS OF ADVERSE EVENTS
Rest drink plenty of fluids  Take claritin daily, use flonase as directed  Follow up with your primary care doctor or family practice, call for an appointment  Return to the emergency room with any concerns or worsening of symptoms  
Concern for cardiopulmonary deterioration

## 2023-06-28 NOTE — PATIENT PROFILE ADULT - NSPROPTRIGHTNOTIFY_GEN_A_NUR
[FreeTextEntry1] : 21 year old  and \par  x 3 years\par  straight ; no children\par avoiding conception\par yesterday had difficult intiaiting urination; developed back pain and diaphoris\par pain 13/10 responded to advil\par pain lasted 45 hour\par passed stone\par 
declines

## 2023-08-10 NOTE — ED ADULT NURSE NOTE - CAS TRG GEN SKIN CONDITION
Cool/left mid leg and distally Sotyktu Pregnancy And Lactation Text: There is insufficient data to evaluate whether or not Sotyktu is safe to use during pregnancy.   It is not known if Sotyktu passes into breast milk and whether or not it is safe to use when breastfeeding.

## 2024-03-31 NOTE — PROVIDER CONTACT NOTE (CRITICAL VALUE NOTIFICATION) - SITUATION
Home Sleep Study Documentation    HOME STUDY DEVICE: Noxturnal no                                           Clementina G3 yes      Pre-Sleep Home Study:    Set-up and instructions performed by: MM    Technician performed demonstration for Patient: yes    Return demonstration performed by Patient: yes    Written instructions provided to Patient: yes    Patient signed consent form: yes        Post-Sleep Home Study:    Additional comments by Patient: None    Home Sleep Study Failed:no:    Failure reason: N/A    Reported or Detected: N/A    Scored by: ZAINAB Torre       Pt H&H is 6.8/20.8 this AM

## 2024-08-21 NOTE — ED ADULT TRIAGE NOTE - CHIEF COMPLAINT QUOTE
patient complaining of left foot pain and bleeding after "picking at it." bleeding controlled with gauze and cornelio. patient on eliquis. denies trauma or injury. No

## 2024-09-02 ENCOUNTER — INPATIENT (INPATIENT)
Facility: HOSPITAL | Age: 69
LOS: 8 days | Discharge: NURSING, REHAB OR LONG-TERM CARE | DRG: 536 | End: 2024-09-11
Attending: FAMILY MEDICINE | Admitting: FAMILY MEDICINE
Payer: MEDICARE

## 2024-09-02 VITALS
OXYGEN SATURATION: 95 % | RESPIRATION RATE: 17 BRPM | DIASTOLIC BLOOD PRESSURE: 67 MMHG | SYSTOLIC BLOOD PRESSURE: 118 MMHG | WEIGHT: 276.9 LBS | HEART RATE: 58 BPM | TEMPERATURE: 98 F | HEIGHT: 75 IN

## 2024-09-02 DIAGNOSIS — S72.001A FRACTURE OF UNSPECIFIED PART OF NECK OF RIGHT FEMUR, INITIAL ENCOUNTER FOR CLOSED FRACTURE: ICD-10-CM

## 2024-09-02 DIAGNOSIS — Z89.512 ACQUIRED ABSENCE OF LEFT LEG BELOW KNEE: Chronic | ICD-10-CM

## 2024-09-02 DIAGNOSIS — Z95.1 PRESENCE OF AORTOCORONARY BYPASS GRAFT: Chronic | ICD-10-CM

## 2024-09-02 DIAGNOSIS — Z98.890 OTHER SPECIFIED POSTPROCEDURAL STATES: Chronic | ICD-10-CM

## 2024-09-02 LAB
ANION GAP SERPL CALC-SCNC: 7 MMOL/L — SIGNIFICANT CHANGE UP (ref 5–17)
APTT BLD: 36.9 SEC — HIGH (ref 24.5–35.6)
BASOPHILS # BLD AUTO: 0.11 K/UL — SIGNIFICANT CHANGE UP (ref 0–0.2)
BASOPHILS NFR BLD AUTO: 1.4 % — SIGNIFICANT CHANGE UP (ref 0–2)
BLD GP AB SCN SERPL QL: SIGNIFICANT CHANGE UP
BUN SERPL-MCNC: 15 MG/DL — SIGNIFICANT CHANGE UP (ref 7–23)
CALCIUM SERPL-MCNC: 9 MG/DL — SIGNIFICANT CHANGE UP (ref 8.5–10.1)
CHLORIDE SERPL-SCNC: 108 MMOL/L — SIGNIFICANT CHANGE UP (ref 96–108)
CO2 SERPL-SCNC: 25 MMOL/L — SIGNIFICANT CHANGE UP (ref 22–31)
CREAT SERPL-MCNC: 1 MG/DL — SIGNIFICANT CHANGE UP (ref 0.5–1.3)
EGFR: 81 ML/MIN/1.73M2 — SIGNIFICANT CHANGE UP
EOSINOPHIL # BLD AUTO: 0.12 K/UL — SIGNIFICANT CHANGE UP (ref 0–0.5)
EOSINOPHIL NFR BLD AUTO: 1.5 % — SIGNIFICANT CHANGE UP (ref 0–6)
GLUCOSE SERPL-MCNC: 121 MG/DL — HIGH (ref 70–99)
HCT VFR BLD CALC: 38.5 % — LOW (ref 39–50)
HGB BLD-MCNC: 12.7 G/DL — LOW (ref 13–17)
IMM GRANULOCYTES NFR BLD AUTO: 0.3 % — SIGNIFICANT CHANGE UP (ref 0–0.9)
INR BLD: 1.04 RATIO — SIGNIFICANT CHANGE UP (ref 0.85–1.18)
LYMPHOCYTES # BLD AUTO: 1.75 K/UL — SIGNIFICANT CHANGE UP (ref 1–3.3)
LYMPHOCYTES # BLD AUTO: 22.2 % — SIGNIFICANT CHANGE UP (ref 13–44)
MCHC RBC-ENTMCNC: 29.1 PG — SIGNIFICANT CHANGE UP (ref 27–34)
MCHC RBC-ENTMCNC: 33 GM/DL — SIGNIFICANT CHANGE UP (ref 32–36)
MCV RBC AUTO: 88.1 FL — SIGNIFICANT CHANGE UP (ref 80–100)
MONOCYTES # BLD AUTO: 0.5 K/UL — SIGNIFICANT CHANGE UP (ref 0–0.9)
MONOCYTES NFR BLD AUTO: 6.3 % — SIGNIFICANT CHANGE UP (ref 2–14)
NEUTROPHILS # BLD AUTO: 5.39 K/UL — SIGNIFICANT CHANGE UP (ref 1.8–7.4)
NEUTROPHILS NFR BLD AUTO: 68.3 % — SIGNIFICANT CHANGE UP (ref 43–77)
PLATELET # BLD AUTO: 224 K/UL — SIGNIFICANT CHANGE UP (ref 150–400)
POTASSIUM SERPL-MCNC: 3.7 MMOL/L — SIGNIFICANT CHANGE UP (ref 3.5–5.3)
POTASSIUM SERPL-SCNC: 3.7 MMOL/L — SIGNIFICANT CHANGE UP (ref 3.5–5.3)
PROTHROM AB SERPL-ACNC: 11.7 SEC — SIGNIFICANT CHANGE UP (ref 9.5–13)
RBC # BLD: 4.37 M/UL — SIGNIFICANT CHANGE UP (ref 4.2–5.8)
RBC # FLD: 14 % — SIGNIFICANT CHANGE UP (ref 10.3–14.5)
SODIUM SERPL-SCNC: 140 MMOL/L — SIGNIFICANT CHANGE UP (ref 135–145)
WBC # BLD: 7.89 K/UL — SIGNIFICANT CHANGE UP (ref 3.8–10.5)
WBC # FLD AUTO: 7.89 K/UL — SIGNIFICANT CHANGE UP (ref 3.8–10.5)

## 2024-09-02 PROCEDURE — 85014 HEMATOCRIT: CPT

## 2024-09-02 PROCEDURE — 97161 PT EVAL LOW COMPLEX 20 MIN: CPT | Mod: GP

## 2024-09-02 PROCEDURE — 85384 FIBRINOGEN ACTIVITY: CPT

## 2024-09-02 PROCEDURE — 85025 COMPLETE CBC W/AUTO DIFF WBC: CPT

## 2024-09-02 PROCEDURE — 85610 PROTHROMBIN TIME: CPT

## 2024-09-02 PROCEDURE — 84100 ASSAY OF PHOSPHORUS: CPT

## 2024-09-02 PROCEDURE — 99222 1ST HOSP IP/OBS MODERATE 55: CPT

## 2024-09-02 PROCEDURE — 82550 ASSAY OF CK (CPK): CPT

## 2024-09-02 PROCEDURE — 73552 X-RAY EXAM OF FEMUR 2/>: CPT | Mod: 26,RT

## 2024-09-02 PROCEDURE — 36415 COLL VENOUS BLD VENIPUNCTURE: CPT

## 2024-09-02 PROCEDURE — P9100: CPT

## 2024-09-02 PROCEDURE — 86850 RBC ANTIBODY SCREEN: CPT

## 2024-09-02 PROCEDURE — P9037: CPT

## 2024-09-02 PROCEDURE — C1889: CPT

## 2024-09-02 PROCEDURE — C9399: CPT

## 2024-09-02 PROCEDURE — 99285 EMERGENCY DEPT VISIT HI MDM: CPT

## 2024-09-02 PROCEDURE — 87641 MR-STAPH DNA AMP PROBE: CPT

## 2024-09-02 PROCEDURE — 93306 TTE W/DOPPLER COMPLETE: CPT

## 2024-09-02 PROCEDURE — 73706 CT ANGIO LWR EXTR W/O&W/DYE: CPT | Mod: MC,RT

## 2024-09-02 PROCEDURE — 82962 GLUCOSE BLOOD TEST: CPT

## 2024-09-02 PROCEDURE — 93926 LOWER EXTREMITY STUDY: CPT | Mod: RT

## 2024-09-02 PROCEDURE — P9017: CPT

## 2024-09-02 PROCEDURE — 93010 ELECTROCARDIOGRAM REPORT: CPT

## 2024-09-02 PROCEDURE — 80048 BASIC METABOLIC PNL TOTAL CA: CPT

## 2024-09-02 PROCEDURE — 85018 HEMOGLOBIN: CPT

## 2024-09-02 PROCEDURE — 73502 X-RAY EXAM HIP UNI 2-3 VIEWS: CPT | Mod: RT

## 2024-09-02 PROCEDURE — 87086 URINE CULTURE/COLONY COUNT: CPT

## 2024-09-02 PROCEDURE — 82040 ASSAY OF SERUM ALBUMIN: CPT

## 2024-09-02 PROCEDURE — 87040 BLOOD CULTURE FOR BACTERIA: CPT

## 2024-09-02 PROCEDURE — C1713: CPT

## 2024-09-02 PROCEDURE — 36430 TRANSFUSION BLD/BLD COMPNT: CPT

## 2024-09-02 PROCEDURE — 81001 URINALYSIS AUTO W/SCOPE: CPT

## 2024-09-02 PROCEDURE — 76870 US EXAM SCROTUM: CPT

## 2024-09-02 PROCEDURE — 71045 X-RAY EXAM CHEST 1 VIEW: CPT

## 2024-09-02 PROCEDURE — 73502 X-RAY EXAM HIP UNI 2-3 VIEWS: CPT | Mod: 26,RT,76

## 2024-09-02 PROCEDURE — 86900 BLOOD TYPING SEROLOGIC ABO: CPT

## 2024-09-02 PROCEDURE — 76000 FLUOROSCOPY <1 HR PHYS/QHP: CPT

## 2024-09-02 PROCEDURE — 93005 ELECTROCARDIOGRAM TRACING: CPT

## 2024-09-02 PROCEDURE — 85027 COMPLETE CBC AUTOMATED: CPT

## 2024-09-02 PROCEDURE — 80053 COMPREHEN METABOLIC PANEL: CPT

## 2024-09-02 PROCEDURE — 71275 CT ANGIOGRAPHY CHEST: CPT | Mod: MC

## 2024-09-02 PROCEDURE — 82803 BLOOD GASES ANY COMBINATION: CPT

## 2024-09-02 PROCEDURE — 84484 ASSAY OF TROPONIN QUANT: CPT

## 2024-09-02 PROCEDURE — 86923 COMPATIBILITY TEST ELECTRIC: CPT

## 2024-09-02 PROCEDURE — 93971 EXTREMITY STUDY: CPT | Mod: RT

## 2024-09-02 PROCEDURE — 83605 ASSAY OF LACTIC ACID: CPT

## 2024-09-02 PROCEDURE — 36600 WITHDRAWAL OF ARTERIAL BLOOD: CPT

## 2024-09-02 PROCEDURE — 85730 THROMBOPLASTIN TIME PARTIAL: CPT

## 2024-09-02 PROCEDURE — 70450 CT HEAD/BRAIN W/O DYE: CPT | Mod: MC

## 2024-09-02 PROCEDURE — 71045 X-RAY EXAM CHEST 1 VIEW: CPT | Mod: 26

## 2024-09-02 PROCEDURE — P9016: CPT

## 2024-09-02 PROCEDURE — 83735 ASSAY OF MAGNESIUM: CPT

## 2024-09-02 PROCEDURE — 86901 BLOOD TYPING SEROLOGIC RH(D): CPT

## 2024-09-02 PROCEDURE — 87640 STAPH A DNA AMP PROBE: CPT

## 2024-09-02 RX ORDER — METOPROLOL TARTRATE 100 MG/1
25 TABLET ORAL
Refills: 0 | Status: DISCONTINUED | OUTPATIENT
Start: 2024-09-02 | End: 2024-09-03

## 2024-09-02 RX ORDER — ACETAMINOPHEN 325 MG/1
650 TABLET ORAL EVERY 6 HOURS
Refills: 0 | Status: DISCONTINUED | OUTPATIENT
Start: 2024-09-02 | End: 2024-09-03

## 2024-09-02 RX ORDER — SUCRALFATE 1 G/10ML
1 SUSPENSION ORAL
Refills: 0 | Status: DISCONTINUED | OUTPATIENT
Start: 2024-09-02 | End: 2024-09-03

## 2024-09-02 RX ORDER — AMLODIPINE BESYLATE 10 MG/1
5 TABLET ORAL DAILY
Refills: 0 | Status: DISCONTINUED | OUTPATIENT
Start: 2024-09-02 | End: 2024-09-03

## 2024-09-02 RX ORDER — HYDROMORPHONE HYDROCHLORIDE 2 MG/1
0.5 TABLET ORAL EVERY 4 HOURS
Refills: 0 | Status: DISCONTINUED | OUTPATIENT
Start: 2024-09-02 | End: 2024-09-03

## 2024-09-02 RX ORDER — TRAMADOL HYDROCHLORIDE 200 MG/1
25 TABLET, EXTENDED RELEASE ORAL EVERY 6 HOURS
Refills: 0 | Status: DISCONTINUED | OUTPATIENT
Start: 2024-09-02 | End: 2024-09-03

## 2024-09-02 RX ORDER — HYDROMORPHONE HYDROCHLORIDE 2 MG/1
1 TABLET ORAL ONCE
Refills: 0 | Status: DISCONTINUED | OUTPATIENT
Start: 2024-09-02 | End: 2024-09-02

## 2024-09-02 RX ORDER — ONDANSETRON 2 MG/ML
4 INJECTION, SOLUTION INTRAMUSCULAR; INTRAVENOUS ONCE
Refills: 0 | Status: COMPLETED | OUTPATIENT
Start: 2024-09-02 | End: 2024-09-02

## 2024-09-02 RX ORDER — GABAPENTIN 100 MG
1 CAPSULE ORAL
Refills: 0 | DISCHARGE

## 2024-09-02 RX ORDER — PANTOPRAZOLE SODIUM 40 MG
40 TABLET, DELAYED RELEASE (ENTERIC COATED) ORAL
Refills: 0 | Status: DISCONTINUED | OUTPATIENT
Start: 2024-09-02 | End: 2024-09-03

## 2024-09-02 RX ORDER — GABAPENTIN 100 MG
2 CAPSULE ORAL
Refills: 0 | DISCHARGE

## 2024-09-02 RX ORDER — ONDANSETRON 2 MG/ML
4 INJECTION, SOLUTION INTRAMUSCULAR; INTRAVENOUS EVERY 6 HOURS
Refills: 0 | Status: DISCONTINUED | OUTPATIENT
Start: 2024-09-02 | End: 2024-09-03

## 2024-09-02 RX ORDER — ZINC OXIDE 100 MG/G
1 OINTMENT TOPICAL
Refills: 0 | DISCHARGE

## 2024-09-02 RX ADMIN — Medication 4 MILLIGRAM(S): at 17:21

## 2024-09-02 RX ADMIN — ONDANSETRON 4 MILLIGRAM(S): 2 INJECTION, SOLUTION INTRAMUSCULAR; INTRAVENOUS at 17:21

## 2024-09-02 RX ADMIN — SUCRALFATE 1 GRAM(S): 1 SUSPENSION ORAL at 23:51

## 2024-09-02 RX ADMIN — HYDROMORPHONE HYDROCHLORIDE 1 MILLIGRAM(S): 2 TABLET ORAL at 21:36

## 2024-09-02 RX ADMIN — METOPROLOL TARTRATE 25 MILLIGRAM(S): 100 TABLET ORAL at 23:51

## 2024-09-02 RX ADMIN — Medication 10 MILLIGRAM(S): at 23:51

## 2024-09-02 NOTE — PHARMACOTHERAPY INTERVENTION NOTE - COMMENTS
Medication reconciliation complete.  Home medications reviewed with Jimenez from [facility] and confirmed against Dr. First medgraeme.    Home Medications:  A &amp; D Ointment: 1 application to right heel as needed after cleaning (02 Sep 2024 22:03)  amLODIPine 5 mg oral tablet: 1 tab(s) orally once a day ,hold for sbp below 100 (02 Sep 2024 22:02)  atorvastatin 20 mg oral tablet: 1 tab(s) orally once a day (at bedtime) (02 Sep 2024 22:03)  clopidogrel 75 mg oral tablet: 1 tab(s) orally once a day (02 Sep 2024 22:02)  gabapentin 100 mg oral capsule: 1 cap(s) orally 2 times a day (02 Sep 2024 22:03)  gabapentin 100 mg oral capsule: 2 cap(s) orally once a day (at bedtime) (02 Sep 2024 22:03)  Melatonin 3 mg oral tablet: 3 tab(s) orally once a day (at bedtime) (02 Sep 2024 22:03)  metoprolol tartrate 25 mg oral tablet: 1 tab(s) orally 2 times a day ,hold for sbp below 100 or hr below 60 (02 Sep 2024 22:02)  Milk of Magnesia 400 mg / 5 mL: Take 30 mL by mouth as needed if no bowel movement x 3 days (02 Sep 2024 22:03)  nystatin 100,000 units/g topical powder: 1 application topically every 8 hours (02 Sep 2024 22:02)  sucralfate 1 g oral tablet: 1 tab(s) orally 2 times a day (02 Sep 2024 22:02)  zinc oxide topical: 1 application to bilateral buttocks three times a day (02 Sep 2024 22:03)

## 2024-09-02 NOTE — ED ADULT TRIAGE NOTE - INTERNATIONAL TRAVEL
Mom needs health form filled out.  Blue form filled out and given to [facility] [place].  Katherin Ng M.A.     No

## 2024-09-02 NOTE — ED ADULT NURSE NOTE - NSFALLRISKINTERV_ED_ALL_ED

## 2024-09-02 NOTE — ED PROVIDER NOTE - OTHER FINDINGS
non sepcific t wave inversion precordial leads non sepcific t wave inversion precordial leads, no change from 2/23/19 ekg

## 2024-09-02 NOTE — ED ADULT NURSE REASSESSMENT NOTE - NS ED NURSE REASSESS COMMENT FT1
resumed care from Bonnie DUTTA. pt awake, respirations even and unlabored.  ortho speaking with patient and family at this time.

## 2024-09-02 NOTE — H&P ADULT - HISTORY OF PRESENT ILLNESS
this is a 68 years old male with hx of cva left hemiplegia, contracted LUE, cad, pad, cabg, lt aka, lives in SNF, wheelchair bound.    Today he went to the mall with his family.  While on hoyar lift getting back into car his R leg was caught and caused excruciating pain causing him to become diaphoretic and vomiting  he is found to have rt femur fractrue  pt is awake, poor historian, denies any complaints

## 2024-09-02 NOTE — ED ADULT NURSE NOTE - CHIEF COMPLAINT QUOTE
Pt coming in by car from the mall with c/o right sided leg pain. Pt is a leg leg amputee, resides at [facility] from rehab. Family reports they brought him to the mall and when transporting him into the car they injured his right leg, pt believes he broke his femur. Pt is covered in vomit and noted to be diaphoretic, c/o of 10/10 pain. Denies CP or SOB. NKDA. Fall risk band applied.

## 2024-09-02 NOTE — ED PROVIDER NOTE - ATTENDING APP SHARED VISIT CONTRIBUTION OF CARE
I personally saw the patient with the PA, and completed the key components of the history and physical exam. I then discussed the management plan with the PA.    General In moderate pain respiratory clear cardiac no murmur abdomen soft  R leg externally rotated, contracted in bent position at baseline, warm, normal sensation, 2+distal pulses. pain with passive movement at hip, no pelvic tenderness.  JENNIE BARRERA

## 2024-09-02 NOTE — ED ADULT NURSE NOTE - NSFALLASSESSNEED_ED_ALL_ED
Daily Note     Today's date: 2023  Patient name: Mayra Swain  : 1971  MRN: [id number]  Referring provider: JULIUS Gilliland  Dx:   Encounter Diagnosis     ICD-10-CM    1. Back pain with right-sided radiculopathy  M54.10       2. Abnormality of gait and mobility  R26.9           Start Time: 1400  Stop Time: 1500  Total time in clinic (min): 60 minutes    Subjective: he seems to be doing good today. Objective: See treatment diary below      Assessment: Tolerated treatment well. Patient would benefit from continued PT  pt showing improvement with his walking, transfers and strength. He was able to walk 3 x today at 100 feet or more for each. ,we began the bean bag toss today . He was unable to catch them , but was able to throw them back. We worked some throws with his feet up . Plan: Continue per plan of care. Precautions: Down Syndrome 1:1 treatment - assist from therapist for completion of TE, Fall Risk           Manuals    B LE - Hamstring, Piriformis, hip flexion 8 min 10 min 15 min 15 min 8 min           Neuro Re-Ed                                         Ther Ex        NuStep for strengthening 5m L5 L5 7 min L6 8 min L6 10 min L 4 10 min   Bike for strengthening    5 min    Bridges 20 x 20x 15x 15x 30 x   LTR 10x       SKTC        LAQ 10x  20x 20x ea. 20 x 30 x each 2#   Hip Abd L2 tb 10x   15x ea.    15 x    Clamshells        Supine SLR 10 x  15x ea 15 x  15x   DKTC w/PBall        Seated Flex Stretch w/PBall 10x 5"    10 x 5 "   Seated trunk rotations        Ther Activity        STS 10x  15x 15x 20 x 10 x   Side stepping in // bars   2 laps     Ball toss in chair  Big red ball 20x  Big red 40 x     Standing   Dance 3x    2 times at one min each   Gait Training        Gait training w/ RW RW 4 x 100ft RW 3x200 ft 100 ft 4 x  100ft x4 With RW x 400 ft           Modalities        HP/CP prn yes

## 2024-09-02 NOTE — ED PROVIDER NOTE - MUSCULOSKELETAL, MLM
R leg externally rotated, contracted in bent position at baseline, warm, normal sensation, 2+distal pulses. pain with passive movement at hip, no pelvic tenderness.  JENNIE BARRERA

## 2024-09-02 NOTE — H&P ADULT - NSHPPHYSICALEXAM_GEN_ALL_CORE
· CONSTITUTIONAL: awake, alert, wears glasses, does not appear to be in pain  · HENMT: ATNC. ears externally normal no lesions or deformities  · EYES: Clear bilaterally, pupils equal, round and reactive to light.  · CARDIAC: Normal rate, regular rhythm. s1s2  · RESPIRATORY: clear to auscultation, symmetrical expansion, no labored breathing  · GASTROINTESTINAL: Abdomen soft, non-tender, no guarding.  · MUSCULOSKELETAL: R leg externally rotated, some rigidity noted, lue is contracted, left aka, rue good strength 5/5     · SKIN:  normal temp noted

## 2024-09-02 NOTE — ED PROVIDER NOTE - OBJECTIVE STATEMENT
67 yo M with hx of CVA, L EMIA, lives in SNF, wheelchair bound.  Today he went to the mall with his family.  While on hoya lift getting back into car his R leg was caught and caused excruciating pain causing him to become diaphoretic and vomit,.  He has severe pain when the leg is moved since then.  Denies injury to any other area

## 2024-09-02 NOTE — H&P ADULT - ASSESSMENT
67 yo M with hx of CVA, L BKA, lives in SNF, wheelchair bound. Today he went to the mall with his family.  While on hoyar lift getting lifted back into car his R leg was caught and caused right femur fracture    R Proximal femur fracture   -ortho consult  -pain for OR tomorrow  -pain control    hx of  DVT in the past   -was on xarelto until 2021    history of PAD s/p left popiletal bypass, s/p Left AKA , CAD s/p cabg, prior CVAs with left hemiplegia, contracted LUE, bed bound  -hold plavix for now for surgery  -continue lipitor    history of prior GI bleed while on xarelto  -monitor hg    Hypertension.  -continue metoprolol  -continue norvasc ( holding parameters , hold sbp < 140 )    Disp  pt is medically stable and optimized to proceed with necessary surgical procedure by ortho team for rt femur fracture, although patient is wheel chair/bedbound however surgical intervention may help with pain and quality of life  he would be at intermediate risk for perioperative complications given significant neurocardiovascular history with cad pad cva bypass surgery and amputation. his ekg shows no signs of ACS or arrythmia.

## 2024-09-02 NOTE — ED ADULT NURSE NOTE - ISOLATION TYPE:
None

## 2024-09-02 NOTE — ED ADULT TRIAGE NOTE - CHIEF COMPLAINT QUOTE
Pt coming in by car from the mall with c/o right sided leg pain. Pt is a leg leg amputee, resides at [facility] from rehab. Family reports they brought him to the mall and when transporting him into the car they injured his right leg, pt believes he broke his femur. Pt is covered in vomit and noted to be diaphoretic, c/o of 10/10 pain. Denies CP or SOB. NKDA. Fall risk band applied.

## 2024-09-02 NOTE — ED ADULT NURSE NOTE - OBJECTIVE STATEMENT
68y male presented to the ED with complaints of right leg pain, pt has left leg amputation. Pt was transferring from wheelchair to car and injured his left leg. pt with left arm contracted. 68y male presented to the ED with complaints of right leg pain, pt has left leg amputation. Pt was transferring from wheelchair to car and injured his right leg. pt with left arm contracted. 68y male presented to the ED with complaints of right leg pain, pt has left leg amputation. Pt was transferring from wheelchair to car and injured his right leg causing him to become diaphoretic and vomit.  Pt with left arm contracted.

## 2024-09-02 NOTE — H&P ADULT - NSHPREVIEWOFSYSTEMS_GEN_ALL_CORE
pleasant, poor historian  no chest pain or sob  no fever or chills  denies constipation or urinary complaints
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
## 2024-09-03 ENCOUNTER — TRANSCRIPTION ENCOUNTER (OUTPATIENT)
Age: 69
End: 2024-09-03

## 2024-09-03 DIAGNOSIS — I63.9 CEREBRAL INFARCTION, UNSPECIFIED: ICD-10-CM

## 2024-09-03 DIAGNOSIS — Z86.718 PERSONAL HISTORY OF OTHER VENOUS THROMBOSIS AND EMBOLISM: ICD-10-CM

## 2024-09-03 DIAGNOSIS — I25.10 ATHEROSCLEROTIC HEART DISEASE OF NATIVE CORONARY ARTERY WITHOUT ANGINA PECTORIS: ICD-10-CM

## 2024-09-03 DIAGNOSIS — Z01.810 ENCOUNTER FOR PREPROCEDURAL CARDIOVASCULAR EXAMINATION: ICD-10-CM

## 2024-09-03 LAB
ALBUMIN SERPL ELPH-MCNC: 2.6 G/DL — LOW (ref 3.3–5)
ALBUMIN SERPL ELPH-MCNC: 2.8 G/DL — LOW (ref 3.3–5)
ALBUMIN SERPL ELPH-MCNC: 3.3 G/DL — SIGNIFICANT CHANGE UP (ref 3.3–5)
ALP SERPL-CCNC: 100 U/L — SIGNIFICANT CHANGE UP (ref 40–120)
ALP SERPL-CCNC: 78 U/L — SIGNIFICANT CHANGE UP (ref 40–120)
ALP SERPL-CCNC: 79 U/L — SIGNIFICANT CHANGE UP (ref 40–120)
ALT FLD-CCNC: 10 U/L — LOW (ref 12–78)
ALT FLD-CCNC: 11 U/L — LOW (ref 12–78)
ALT FLD-CCNC: 13 U/L — SIGNIFICANT CHANGE UP (ref 12–78)
ANION GAP SERPL CALC-SCNC: 4 MMOL/L — LOW (ref 5–17)
ANION GAP SERPL CALC-SCNC: 7 MMOL/L — SIGNIFICANT CHANGE UP (ref 5–17)
ANION GAP SERPL CALC-SCNC: 8 MMOL/L — SIGNIFICANT CHANGE UP (ref 5–17)
APTT BLD: 33.7 SEC — SIGNIFICANT CHANGE UP (ref 24.5–35.6)
APTT BLD: 36.5 SEC — HIGH (ref 24.5–35.6)
AST SERPL-CCNC: 20 U/L — SIGNIFICANT CHANGE UP (ref 15–37)
AST SERPL-CCNC: 23 U/L — SIGNIFICANT CHANGE UP (ref 15–37)
AST SERPL-CCNC: 25 U/L — SIGNIFICANT CHANGE UP (ref 15–37)
BASE EXCESS BLDA CALC-SCNC: -4.2 MMOL/L — LOW (ref -2–3)
BASOPHILS # BLD AUTO: 0.09 K/UL — SIGNIFICANT CHANGE UP (ref 0–0.2)
BASOPHILS NFR BLD AUTO: 0.4 % — SIGNIFICANT CHANGE UP (ref 0–2)
BILIRUB SERPL-MCNC: 1.1 MG/DL — SIGNIFICANT CHANGE UP (ref 0.2–1.2)
BILIRUB SERPL-MCNC: 1.7 MG/DL — HIGH (ref 0.2–1.2)
BILIRUB SERPL-MCNC: 2.5 MG/DL — HIGH (ref 0.2–1.2)
BLOOD GAS COMMENTS ARTERIAL: SIGNIFICANT CHANGE UP
BUN SERPL-MCNC: 19 MG/DL — SIGNIFICANT CHANGE UP (ref 7–23)
BUN SERPL-MCNC: 19 MG/DL — SIGNIFICANT CHANGE UP (ref 7–23)
BUN SERPL-MCNC: 20 MG/DL — SIGNIFICANT CHANGE UP (ref 7–23)
CALCIUM SERPL-MCNC: 8.2 MG/DL — LOW (ref 8.5–10.1)
CALCIUM SERPL-MCNC: 8.3 MG/DL — LOW (ref 8.5–10.1)
CALCIUM SERPL-MCNC: 8.8 MG/DL — SIGNIFICANT CHANGE UP (ref 8.5–10.1)
CHLORIDE SERPL-SCNC: 107 MMOL/L — SIGNIFICANT CHANGE UP (ref 96–108)
CHLORIDE SERPL-SCNC: 110 MMOL/L — HIGH (ref 96–108)
CHLORIDE SERPL-SCNC: 110 MMOL/L — HIGH (ref 96–108)
CK SERPL-CCNC: 444 U/L — HIGH (ref 26–308)
CO2 SERPL-SCNC: 21 MMOL/L — LOW (ref 22–31)
CO2 SERPL-SCNC: 24 MMOL/L — SIGNIFICANT CHANGE UP (ref 22–31)
CO2 SERPL-SCNC: 26 MMOL/L — SIGNIFICANT CHANGE UP (ref 22–31)
CREAT SERPL-MCNC: 0.92 MG/DL — SIGNIFICANT CHANGE UP (ref 0.5–1.3)
CREAT SERPL-MCNC: 0.95 MG/DL — SIGNIFICANT CHANGE UP (ref 0.5–1.3)
CREAT SERPL-MCNC: 1.24 MG/DL — SIGNIFICANT CHANGE UP (ref 0.5–1.3)
EGFR: 63 ML/MIN/1.73M2 — SIGNIFICANT CHANGE UP
EGFR: 86 ML/MIN/1.73M2 — SIGNIFICANT CHANGE UP
EGFR: 89 ML/MIN/1.73M2 — SIGNIFICANT CHANGE UP
EOSINOPHIL # BLD AUTO: 0.08 K/UL — SIGNIFICANT CHANGE UP (ref 0–0.5)
EOSINOPHIL NFR BLD AUTO: 0.4 % — SIGNIFICANT CHANGE UP (ref 0–6)
GLUCOSE BLDC GLUCOMTR-MCNC: 147 MG/DL — HIGH (ref 70–99)
GLUCOSE SERPL-MCNC: 113 MG/DL — HIGH (ref 70–99)
GLUCOSE SERPL-MCNC: 137 MG/DL — HIGH (ref 70–99)
GLUCOSE SERPL-MCNC: 169 MG/DL — HIGH (ref 70–99)
HCO3 BLDA-SCNC: 20 MMOL/L — LOW (ref 21–28)
HCT VFR BLD CALC: 29.5 % — LOW (ref 39–50)
HCT VFR BLD CALC: 34.6 % — LOW (ref 39–50)
HCT VFR BLD CALC: 36 % — LOW (ref 39–50)
HCT VFR BLD CALC: 39.9 % — SIGNIFICANT CHANGE UP (ref 39–50)
HGB BLD-MCNC: 10.3 G/DL — LOW (ref 13–17)
HGB BLD-MCNC: 11.8 G/DL — LOW (ref 13–17)
HGB BLD-MCNC: 12.1 G/DL — LOW (ref 13–17)
HGB BLD-MCNC: 13.1 G/DL — SIGNIFICANT CHANGE UP (ref 13–17)
IMM GRANULOCYTES NFR BLD AUTO: 0.6 % — SIGNIFICANT CHANGE UP (ref 0–0.9)
INR BLD: 1.11 RATIO — SIGNIFICANT CHANGE UP (ref 0.85–1.18)
INR BLD: 1.25 RATIO — HIGH (ref 0.85–1.18)
LACTATE SERPL-SCNC: 3.1 MMOL/L — HIGH (ref 0.7–2)
LACTATE SERPL-SCNC: 6.3 MMOL/L — CRITICAL HIGH (ref 0.7–2)
LYMPHOCYTES # BLD AUTO: 1.69 K/UL — SIGNIFICANT CHANGE UP (ref 1–3.3)
LYMPHOCYTES # BLD AUTO: 8.4 % — LOW (ref 13–44)
MAGNESIUM SERPL-MCNC: 2 MG/DL — SIGNIFICANT CHANGE UP (ref 1.6–2.6)
MAGNESIUM SERPL-MCNC: 2 MG/DL — SIGNIFICANT CHANGE UP (ref 1.6–2.6)
MANUAL SMEAR VERIFICATION: SIGNIFICANT CHANGE UP
MCHC RBC-ENTMCNC: 29 PG — SIGNIFICANT CHANGE UP (ref 27–34)
MCHC RBC-ENTMCNC: 30.1 PG — SIGNIFICANT CHANGE UP (ref 27–34)
MCHC RBC-ENTMCNC: 30.1 PG — SIGNIFICANT CHANGE UP (ref 27–34)
MCHC RBC-ENTMCNC: 30.4 PG — SIGNIFICANT CHANGE UP (ref 27–34)
MCHC RBC-ENTMCNC: 32.8 GM/DL — SIGNIFICANT CHANGE UP (ref 32–36)
MCHC RBC-ENTMCNC: 33.6 GM/DL — SIGNIFICANT CHANGE UP (ref 32–36)
MCHC RBC-ENTMCNC: 34.1 GM/DL — SIGNIFICANT CHANGE UP (ref 32–36)
MCHC RBC-ENTMCNC: 34.9 GM/DL — SIGNIFICANT CHANGE UP (ref 32–36)
MCV RBC AUTO: 87 FL — SIGNIFICANT CHANGE UP (ref 80–100)
MCV RBC AUTO: 88.3 FL — SIGNIFICANT CHANGE UP (ref 80–100)
MCV RBC AUTO: 88.5 FL — SIGNIFICANT CHANGE UP (ref 80–100)
MCV RBC AUTO: 89.6 FL — SIGNIFICANT CHANGE UP (ref 80–100)
MONOCYTES # BLD AUTO: 1.56 K/UL — HIGH (ref 0–0.9)
MONOCYTES NFR BLD AUTO: 7.8 % — SIGNIFICANT CHANGE UP (ref 2–14)
NEUTROPHILS # BLD AUTO: 16.54 K/UL — HIGH (ref 1.8–7.4)
NEUTROPHILS NFR BLD AUTO: 82.4 % — HIGH (ref 43–77)
PCO2 BLDA: 34 MMHG — LOW (ref 35–48)
PH BLDA: 7.38 — SIGNIFICANT CHANGE UP (ref 7.35–7.45)
PHOSPHATE SERPL-MCNC: 2.5 MG/DL — SIGNIFICANT CHANGE UP (ref 2.5–4.5)
PHOSPHATE SERPL-MCNC: 2.9 MG/DL — SIGNIFICANT CHANGE UP (ref 2.5–4.5)
PLAT MORPH BLD: NORMAL — SIGNIFICANT CHANGE UP
PLATELET # BLD AUTO: 153 K/UL — SIGNIFICANT CHANGE UP (ref 150–400)
PLATELET # BLD AUTO: 177 K/UL — SIGNIFICANT CHANGE UP (ref 150–400)
PLATELET # BLD AUTO: 186 K/UL — SIGNIFICANT CHANGE UP (ref 150–400)
PLATELET # BLD AUTO: 208 K/UL — SIGNIFICANT CHANGE UP (ref 150–400)
PO2 BLDA: 50 MMHG — CRITICAL LOW (ref 83–108)
POTASSIUM SERPL-MCNC: 4.1 MMOL/L — SIGNIFICANT CHANGE UP (ref 3.5–5.3)
POTASSIUM SERPL-MCNC: 4.2 MMOL/L — SIGNIFICANT CHANGE UP (ref 3.5–5.3)
POTASSIUM SERPL-MCNC: 4.2 MMOL/L — SIGNIFICANT CHANGE UP (ref 3.5–5.3)
POTASSIUM SERPL-SCNC: 4.1 MMOL/L — SIGNIFICANT CHANGE UP (ref 3.5–5.3)
POTASSIUM SERPL-SCNC: 4.2 MMOL/L — SIGNIFICANT CHANGE UP (ref 3.5–5.3)
POTASSIUM SERPL-SCNC: 4.2 MMOL/L — SIGNIFICANT CHANGE UP (ref 3.5–5.3)
PROT SERPL-MCNC: 5.6 GM/DL — LOW (ref 6–8.3)
PROT SERPL-MCNC: 6.1 GM/DL — SIGNIFICANT CHANGE UP (ref 6–8.3)
PROT SERPL-MCNC: 7.3 GM/DL — SIGNIFICANT CHANGE UP (ref 6–8.3)
PROTHROM AB SERPL-ACNC: 12.5 SEC — SIGNIFICANT CHANGE UP (ref 9.5–13)
PROTHROM AB SERPL-ACNC: 14 SEC — HIGH (ref 9.5–13)
RBC # BLD: 3.39 M/UL — LOW (ref 4.2–5.8)
RBC # BLD: 3.92 M/UL — LOW (ref 4.2–5.8)
RBC # BLD: 4.02 M/UL — LOW (ref 4.2–5.8)
RBC # BLD: 4.51 M/UL — SIGNIFICANT CHANGE UP (ref 4.2–5.8)
RBC # FLD: 14.2 % — SIGNIFICANT CHANGE UP (ref 10.3–14.5)
RBC # FLD: 14.3 % — SIGNIFICANT CHANGE UP (ref 10.3–14.5)
RBC # FLD: 14.5 % — SIGNIFICANT CHANGE UP (ref 10.3–14.5)
RBC # FLD: 14.5 % — SIGNIFICANT CHANGE UP (ref 10.3–14.5)
RBC BLD AUTO: NORMAL — SIGNIFICANT CHANGE UP
SAO2 % BLDA: 89 % — LOW (ref 94–98)
SODIUM SERPL-SCNC: 137 MMOL/L — SIGNIFICANT CHANGE UP (ref 135–145)
SODIUM SERPL-SCNC: 138 MMOL/L — SIGNIFICANT CHANGE UP (ref 135–145)
SODIUM SERPL-SCNC: 142 MMOL/L — SIGNIFICANT CHANGE UP (ref 135–145)
TROPONIN I, HIGH SENSITIVITY RESULT: 21.38 NG/L — SIGNIFICANT CHANGE UP
WBC # BLD: 10.02 K/UL — SIGNIFICANT CHANGE UP (ref 3.8–10.5)
WBC # BLD: 14.49 K/UL — HIGH (ref 3.8–10.5)
WBC # BLD: 17.36 K/UL — HIGH (ref 3.8–10.5)
WBC # BLD: 20.08 K/UL — HIGH (ref 3.8–10.5)
WBC # FLD AUTO: 10.02 K/UL — SIGNIFICANT CHANGE UP (ref 3.8–10.5)
WBC # FLD AUTO: 14.49 K/UL — HIGH (ref 3.8–10.5)
WBC # FLD AUTO: 17.36 K/UL — HIGH (ref 3.8–10.5)
WBC # FLD AUTO: 20.08 K/UL — HIGH (ref 3.8–10.5)

## 2024-09-03 PROCEDURE — 99232 SBSQ HOSP IP/OBS MODERATE 35: CPT

## 2024-09-03 PROCEDURE — 93010 ELECTROCARDIOGRAM REPORT: CPT

## 2024-09-03 PROCEDURE — 99222 1ST HOSP IP/OBS MODERATE 55: CPT

## 2024-09-03 PROCEDURE — 99024 POSTOP FOLLOW-UP VISIT: CPT

## 2024-09-03 PROCEDURE — 70450 CT HEAD/BRAIN W/O DYE: CPT | Mod: 26

## 2024-09-03 PROCEDURE — 99223 1ST HOSP IP/OBS HIGH 75: CPT

## 2024-09-03 PROCEDURE — 73706 CT ANGIO LWR EXTR W/O&W/DYE: CPT | Mod: 26,RT

## 2024-09-03 PROCEDURE — 27245 TREAT THIGH FRACTURE: CPT | Mod: AS,RT

## 2024-09-03 RX ORDER — AMLODIPINE BESYLATE 10 MG/1
5 TABLET ORAL DAILY
Refills: 0 | Status: DISCONTINUED | OUTPATIENT
Start: 2024-09-03 | End: 2024-09-03

## 2024-09-03 RX ORDER — CEFAZOLIN SODIUM 2 G/100ML
2000 INJECTION, SOLUTION INTRAVENOUS EVERY 8 HOURS
Refills: 0 | Status: COMPLETED | OUTPATIENT
Start: 2024-09-03 | End: 2024-09-04

## 2024-09-03 RX ORDER — METOPROLOL TARTRATE 100 MG/1
5 TABLET ORAL ONCE
Refills: 0 | Status: COMPLETED | OUTPATIENT
Start: 2024-09-03 | End: 2024-09-03

## 2024-09-03 RX ORDER — ONDANSETRON 2 MG/ML
4 INJECTION, SOLUTION INTRAMUSCULAR; INTRAVENOUS EVERY 6 HOURS
Refills: 0 | Status: DISCONTINUED | OUTPATIENT
Start: 2024-09-03 | End: 2024-09-03

## 2024-09-03 RX ORDER — SENNA 187 MG
2 TABLET ORAL AT BEDTIME
Refills: 0 | Status: DISCONTINUED | OUTPATIENT
Start: 2024-09-03 | End: 2024-09-11

## 2024-09-03 RX ORDER — OXYCODONE HYDROCHLORIDE 5 MG/1
10 TABLET ORAL EVERY 4 HOURS
Refills: 0 | Status: DISCONTINUED | OUTPATIENT
Start: 2024-09-03 | End: 2024-09-10

## 2024-09-03 RX ORDER — PANTOPRAZOLE SODIUM 40 MG
40 TABLET, DELAYED RELEASE (ENTERIC COATED) ORAL
Refills: 0 | Status: DISCONTINUED | OUTPATIENT
Start: 2024-09-03 | End: 2024-09-03

## 2024-09-03 RX ORDER — ONDANSETRON 2 MG/ML
4 INJECTION, SOLUTION INTRAMUSCULAR; INTRAVENOUS EVERY 6 HOURS
Refills: 0 | Status: DISCONTINUED | OUTPATIENT
Start: 2024-09-03 | End: 2024-09-11

## 2024-09-03 RX ORDER — SODIUM CHLORIDE 9 MG/ML
1000 INJECTION INTRAMUSCULAR; INTRAVENOUS; SUBCUTANEOUS
Refills: 0 | Status: DISCONTINUED | OUTPATIENT
Start: 2024-09-03 | End: 2024-09-03

## 2024-09-03 RX ORDER — SUCRALFATE 1 G/10ML
1 SUSPENSION ORAL
Refills: 0 | Status: DISCONTINUED | OUTPATIENT
Start: 2024-09-03 | End: 2024-09-11

## 2024-09-03 RX ORDER — METOPROLOL TARTRATE 100 MG/1
25 TABLET ORAL
Refills: 0 | Status: DISCONTINUED | OUTPATIENT
Start: 2024-09-03 | End: 2024-09-04

## 2024-09-03 RX ORDER — METOPROLOL TARTRATE 100 MG/1
25 TABLET ORAL
Refills: 0 | Status: DISCONTINUED | OUTPATIENT
Start: 2024-09-03 | End: 2024-09-03

## 2024-09-03 RX ORDER — POLYETHYLENE GLYCOL 3350 17 G/17G
17 POWDER, FOR SOLUTION ORAL AT BEDTIME
Refills: 0 | Status: DISCONTINUED | OUTPATIENT
Start: 2024-09-03 | End: 2024-09-11

## 2024-09-03 RX ORDER — CEFAZOLIN SODIUM 2 G/100ML
2000 INJECTION, SOLUTION INTRAVENOUS EVERY 8 HOURS
Refills: 0 | Status: DISCONTINUED | OUTPATIENT
Start: 2024-09-03 | End: 2024-09-03

## 2024-09-03 RX ORDER — PANTOPRAZOLE SODIUM 40 MG
40 TABLET, DELAYED RELEASE (ENTERIC COATED) ORAL DAILY
Refills: 0 | Status: DISCONTINUED | OUTPATIENT
Start: 2024-09-03 | End: 2024-09-11

## 2024-09-03 RX ORDER — TRAMADOL HYDROCHLORIDE 200 MG/1
50 TABLET, EXTENDED RELEASE ORAL EVERY 4 HOURS
Refills: 0 | Status: DISCONTINUED | OUTPATIENT
Start: 2024-09-03 | End: 2024-09-07

## 2024-09-03 RX ORDER — ACETAMINOPHEN 325 MG/1
975 TABLET ORAL EVERY 8 HOURS
Refills: 0 | Status: DISCONTINUED | OUTPATIENT
Start: 2024-09-03 | End: 2024-09-11

## 2024-09-03 RX ORDER — OXYCODONE HYDROCHLORIDE 5 MG/1
5 TABLET ORAL EVERY 4 HOURS
Refills: 0 | Status: DISCONTINUED | OUTPATIENT
Start: 2024-09-03 | End: 2024-09-10

## 2024-09-03 RX ADMIN — CEFAZOLIN SODIUM 2000 MILLIGRAM(S): 2 INJECTION, SOLUTION INTRAVENOUS at 23:32

## 2024-09-03 RX ADMIN — Medication 75 MILLILITER(S): at 00:00

## 2024-09-03 RX ADMIN — METOPROLOL TARTRATE 5 MILLIGRAM(S): 100 TABLET ORAL at 23:35

## 2024-09-03 RX ADMIN — Medication 10 MILLIGRAM(S): at 23:39

## 2024-09-03 RX ADMIN — Medication 100 MILLILITER(S): at 19:00

## 2024-09-03 RX ADMIN — METOPROLOL TARTRATE 25 MILLIGRAM(S): 100 TABLET ORAL at 23:39

## 2024-09-03 RX ADMIN — ACETAMINOPHEN 650 MILLIGRAM(S): 325 TABLET ORAL at 05:14

## 2024-09-03 RX ADMIN — Medication 40 MILLIGRAM(S): at 05:14

## 2024-09-03 RX ADMIN — Medication 500 MILLILITER(S): at 23:35

## 2024-09-03 NOTE — PATIENT PROFILE ADULT - FALL HARM RISK - HARM RISK INTERVENTIONS
Assistance with ambulation/Assistance OOB with selected safe patient handling equipment/Communicate Risk of Fall with Harm to all staff/Discuss with provider need for PT consult/Monitor gait and stability/Reinforce activity limits and safety measures with patient and family/Tailored Fall Risk Interventions/Visual Cue: Yellow wristband and red socks/Bed in lowest position, wheels locked, appropriate side rails in place/Call bell, personal items and telephone in reach/Instruct patient to call for assistance before getting out of bed or chair/Non-slip footwear when patient is out of bed/[employer] to call system/Physically safe environment - no spills, clutter or unnecessary equipment/Purposeful Proactive Rounding/Room/bathroom lighting operational, light cord in reach

## 2024-09-03 NOTE — DISCHARGE NOTE PROVIDER - NSDCCPCAREPLAN_GEN_ALL_CORE_FT
PRINCIPAL DISCHARGE DIAGNOSIS  Diagnosis: Fracture of right hip  Assessment and Plan of Treatment:      PRINCIPAL DISCHARGE DIAGNOSIS  Diagnosis: Fracture of right hip  Assessment and Plan of Treatment: Please follow up with Dr Chambers orthopedist as outpatient - underwent Right femoral fracture intramedillary nail repair  follow up with cardiology for SVT and CHF amangement  and to manage dose of lasix in 1 to 2 weeks  check blood work for anemia and serum potassium in 1 week for stability  follow up with vascular for PAD and R femoropopliteal artery aneurysm in 3 to 4 weeks

## 2024-09-03 NOTE — DISCHARGE NOTE PROVIDER - CARE PROVIDERS DIRECT ADDRESSES
,DirectAddress_Unknown ,DirectAddress_Unknown,[email or web address],DirectAddress_Unknown

## 2024-09-03 NOTE — DIETITIAN INITIAL EVALUATION ADULT - NAME AND PHONE
How Severe Is Your Eczema?: moderate Is This A New Presentation, Or A Follow-Up?: Follow Up Eczema Diamante Gupta, SHARIFAN, CDN [phone]
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
## 2024-09-03 NOTE — DISCHARGE NOTE PROVIDER - NSDCFUADDINST_GEN_ALL_CORE_FT
IM Nail DC Instructions:    1. ACTIVITY:PT is WC Bound. Weight Bearing as Tolerated on RIGHT LE/surgical side.  2. DVT:Continue DVT/PE Prophylaxis. See Med Rec for Duration and dose.  3. PT: daily  4. FOLLOW UP: Orthopedic Surgeon DR CHAMBERS in 30 days. Call Office For Appointment.  5. WOUND CARE: STAPLES: Remove by RN POD14 (SEP 17)    6. BANDAGE: MEPILEX:  IF SATURATED Change bandage on POD7 (SEP 10) to dry gauze and tegaderm or paper tape. Do NOT remove on arrival to inspect wound.   7. ***For Dr. Chambers, follow up in 1 month. And perform Portable Xray of Extremity at Rehab POD14 (SEPT 17) before follow up.                  Orthopedic DC Instructions:    ACTIVITY: PT is WC Bound. Weight Bearing as Tolerated on RIGHT LE/surgical side.  DVT:Continue DVT/PE Prophylaxis. See Med Rec for Duration and dose.  FOLLOW UP: Orthopedic Surgeon DR WEBER in 30 days. Call Office For Appointment.  WOUND CARE: STAPLES: Remove by RN POD14 (SEP 17)    BANDAGE: Change bandage Right thigh as needed to dry dressing                  Orthopedic DC Instructions:    ACTIVITY: PT is WC Bound. Weight Bearing for duration and dose on RIGHT LE/surgical side.  DVT:Continue DVT/PE Prophylaxis. See Med Rec for Duration and dose.  FOLLOW UP: Orthopedic Surgeon DR WEBER in 30 days. Call Office For Appointment.  WOUND CARE: STAPLES: Remove by RN POD21 (SEP 26)    BANDAGE: Change bandage Right thigh as needed to dry dressing                  Orthopedic DC Instructions:    ACTIVITY: PT is WC Bound. Weight Bearing for duration and dose on RIGHT LE/surgical side. Can be Steve lifted into chair  DVT:Continue DVT/PE Prophylaxis. See Med Rec for Duration and dose.  FOLLOW UP: Orthopedic Surgeon DR WEBER in 30 days. Call Office For Appointment.  WOUND CARE: STAPLES: Remove by RN POD21 (SEP 26)    BANDAGE: Change bandage Right thigh as needed to dry dressing

## 2024-09-03 NOTE — DIETITIAN INITIAL EVALUATION ADULT - NSFNSGIIOFT_GEN_A_CORE
I&O's Detail    02 Sep 2024 07:01  -  03 Sep 2024 07:00  --------------------------------------------------------  IN:    Lactated Ringers: 450 mL  Total IN: 450 mL    OUT:  Total OUT: 0 mL    Total NET: 450 mL

## 2024-09-03 NOTE — PATIENT PROFILE ADULT - NSPROPTRIGHTNOTIFY_GEN_A_NUR
Reason for Call:  Home Health Care    Tyra with [facility] called regarding (reason for call): Verbal    Orders are needed for this patient. Pt    PT: 1x week for 3weeks    OT: mayur    Skilled Nursing: mayur    Pt Provider: Yuni    Phone Number Homecare Nurse can be reached at: [phone]    Can we leave a detailed message on this number? YES    Phone number patient can be reached at: Other phone number: [phone]    Best Time: any    Call taken on 5/21/2020 at 3:55 PM by Tasia Weaver       information could not be obtained
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
## 2024-09-03 NOTE — PROGRESS NOTE ADULT - SUBJECTIVE AND OBJECTIVE BOX
Patient seen and examined at bedside.  No acute complaints at this time. Pain well controlled. Denies chest pain, shortness of breath, nausea or vomiting.     PE:  Vital Signs Last 24 Hrs  T(C): 36.9 (09-03-24 @ 04:08), Max: 36.9 (09-03-24 @ 04:08)  T(F): 98.4 (09-03-24 @ 04:08), Max: 98.4 (09-03-24 @ 04:08)  HR: 82 (09-03-24 @ 04:08) (58 - 82)  BP: 110/76 (09-03-24 @ 04:08) (110/76 - 132/90)  BP(mean): 99 (09-02-24 @ 19:05) (88 - 99)  RR: 18 (09-02-24 @ 22:45) (17 - 18)  SpO2: 93% (09-03-24 @ 04:08) (92% - 99%)    RLE:  Skin intact, no erythema or ecchymosis  Externally and shortened leg  Contracted at right knee  TTP by hip, nowhere else along RLE  Motor: firing EHL with mild dorsiflexion and plantarflexion seen  +SILT: SPN/DPN/Shruthi/Saph/Tib  + DP  Compartments soft and compressible  No calf tenderness    A/P:   68yMale with R proximal femur fracture.    Plan for OR today with Dr. Chambers  NPO, except medications  IVF while NPO  Please hold Plavix and DVT ppx at this time  Documented medical optimization  Analgesia   NWB  Ice hip as tolerated  Medical recommendations appreciated  To be discussed with attending, will update plan accordingly

## 2024-09-03 NOTE — CONSULT NOTE ADULT - ATTENDING COMMENTS
seen and examined contracted male with femoral shaft fx aborted orthopedic procedure due to intraoperative hypotension; concern for vascular injury  on exam foot warm and well perfused signs of PAD biphasic DP signal no PT signal palpable femoral pulse (consistent with documented exam 2019)    CTA negative for vascular injury or occlusive event; suspected low flow state on CTA    would continue home meds no intervention needed and will follow through stay  arterial duplex to better assess tibial vessels

## 2024-09-03 NOTE — CONSULT NOTE ADULT - PROBLEM SELECTOR RECOMMENDATION 9
Planned nonelective surgical correction for femur fracture.   Patient is considered to be at intermediate risk of perioperative cardiovascular complications.    There are no contraindications to proceed.

## 2024-09-03 NOTE — DISCHARGE NOTE PROVIDER - NSDCMRMEDTOKEN_GEN_ALL_CORE_FT
A &amp; D Ointment: 1 application to right heel as needed after cleaning  amLODIPine 5 mg oral tablet: 1 tab(s) orally once a day ,hold for sbp below 100  atorvastatin 20 mg oral tablet: 1 tab(s) orally once a day (at bedtime)  clopidogrel 75 mg oral tablet: 1 tab(s) orally once a day  gabapentin 100 mg oral capsule: 1 cap(s) orally 2 times a day  gabapentin 100 mg oral capsule: 2 cap(s) orally once a day (at bedtime)  Melatonin 3 mg oral tablet: 3 tab(s) orally once a day (at bedtime)  metoprolol tartrate 25 mg oral tablet: 1 tab(s) orally 2 times a day ,hold for sbp below 100 or hr below 60  Milk of Magnesia 400 mg / 5 mL: Take 30 mL by mouth as needed if no bowel movement x 3 days  nystatin 100,000 units/g topical powder: 1 application topically every 8 hours  sucralfate 1 g oral tablet: 1 tab(s) orally 2 times a day  zinc oxide topical: 1 application to bilateral buttocks three times a day   A &amp; D Ointment: 1 application to right heel as needed after cleaning  atorvastatin 20 mg oral tablet: 1 tab(s) orally once a day (at bedtime)  clopidogrel 75 mg oral tablet: 1 tab(s) orally once a day  enoxaparin: 40 milligram(s) subcutaneous once a day for vte prophylaxis for until 10/21 post R femoral fracture  gabapentin 100 mg oral capsule: 1 cap(s) orally 2 times a day  gabapentin 100 mg oral capsule: 2 cap(s) orally once a day (at bedtime)  Lasix 20 mg oral tablet: 1 tab(s) orally once a day  Melatonin 3 mg oral tablet: 3 tab(s) orally once a day (at bedtime)  metoprolol tartrate 25 mg oral tablet: 1 tab(s) orally 2 times a day ,hold for sbp below 100 or hr below 60  Milk of Magnesia 400 mg / 5 mL: Take 30 mL by mouth as needed if no bowel movement x 3 days  nystatin 100,000 units/g topical powder: 1 application topically every 8 hours  polyethylene glycol 3350 oral powder for reconstitution: 17 gram(s) orally once a day (at bedtime) As needed Constipation  Potassium Chloride (Eqv-Klor-Con 10) 10 mEq oral tablet, extended release: 1 tab(s) orally once a day  senna leaf extract oral tablet: 2 tab(s) orally once a day (at bedtime) As needed Constipation  sucralfate 1 g oral tablet: 1 tab(s) orally 2 times a day  zinc oxide topical: 1 application to bilateral buttocks three times a day

## 2024-09-03 NOTE — DISCHARGE NOTE PROVIDER - PROVIDER TOKENS
PROVIDER:[TOKEN:[[id number]]] PROVIDER:[TOKEN:[[id number]]],PROVIDER:[TOKEN:[[id number]]],PROVIDER:[TOKEN:[[id number]]]

## 2024-09-03 NOTE — DIETITIAN INITIAL EVALUATION ADULT - ORAL INTAKE PTA/DIET HISTORY
From [facility]. No diet order found in shadow chart. Unable to obtain diet hx 2/2 poor historian. Is edentulous, ?dentures - not present at bed side.

## 2024-09-03 NOTE — CONSULT NOTE ADULT - PA/NP COMMENTS
Critical care time spent: 42 minutes including evaluation of presenting problem(s) and associated risks of acute illness that poses high probability of life threatening deterioration and/or end organ damage/dysfunction, reviewing medical record, face-to-face encounter with patient to conduct physical examination and obtain additional history as able. Appropriate diagnostic studies ordered, reviewed, and interpreted as needed. Recommendations and/or interventions made as documented. I have coordinated care with the multidisciplinary team including bedside RN, attending, and consultants as needed. Patient and family education and counseling provided as needed and as appropriate. This excludes any time spent on separate procedures or teaching. Date of entry of this note is equal to date of services rendered.

## 2024-09-03 NOTE — PROGRESS NOTE ADULT - SUBJECTIVE AND OBJECTIVE BOX
HPI:  this is a 68 years old male with hx of cva left hemiplegia, contracted LUE, cad, pad, cabg, left AKA, lives in SNF, wheelchair bound.    [place].  While on hoyar lift getting back into car his R leg was caught and caused excruciating pain causing him to become diaphoretic and vomiting imaging + for right femur fx, presently NPO for planned surgery today      pt is seen at bedside, awake, ? orientation, slow to respond to questions, without any complaints presently    Review of system- Rest of the review of system are normal except mentioned in HPI    Vital Signs Last 24 Hrs  T(C): 37.1 (03 Sep 2024 07:52), Max: 37.1 (03 Sep 2024 07:52)  T(F): 98.7 (03 Sep 2024 07:52), Max: 98.7 (03 Sep 2024 07:52)  HR: 76 (03 Sep 2024 07:52) (58 - 82)  BP: 102/66 (03 Sep 2024 07:52) (102/66 - 132/90)  BP(mean): 99 (02 Sep 2024 19:05) (88 - 99)  RR: 18 (03 Sep 2024 07:52) (17 - 18)  SpO2: 95% (03 Sep 2024 07:52) (92% - 99%)    Parameters below as of 03 Sep 2024 07:52  Patient On (Oxygen Delivery Method): room air    PHYSICAL EXAM:    GENERAL: Comfortable, no acute distress   HEAD:  Normocephalic, atraumatic  EYES: EOMI, PERRLA  HEENT: Moist mucous membranes  NECK: Supple, No JVD  NERVOUS SYSTEM:  Alert & Oriented X3, Motor Strength 5/5 B/L upper and lower extremities  CHEST/LUNG: Clear to auscultation bilaterally  HEART: Regular rate and rhythm  ABDOMEN: Soft, non tender, Nondistended, Bowel sounds present  GENITOURINARY: Voiding, no palpable bladder  EXTREMITIES:   No clubbing, cyanosis, or edema, Left AKA  MUSCULOSKELETAL- RLE externally rotated, + cap refill  SKIN-no rash    LABS:                        13.1   10.02 )-----------( 208      ( 03 Sep 2024 04:12 )             39.9     09-03    137  |  107  |  19  ----------------------------<  113<H>  4.2   |  26  |  0.92    Ca    8.8      03 Sep 2024 04:26    TPro  7.3  /  Alb  3.3  /  TBili  1.1  /  DBili  x   /  AST  23  /  ALT  13  /  AlkPhos  100  09-03    PT/INR - ( 03 Sep 2024 04:26 )   PT: 12.5 sec;   INR: 1.11 ratio      PTT - ( 03 Sep 2024 04:26 )  PTT:36.5 sec    RADIOLOGY & ADDITIONAL TESTS:    < from: Xray Hip w/ Pelvis 2 or 3 Views, Right (09.02.24 @ 22:07) >    ACC: [id number] EXAM:  XR HIP WITH PELV 2-3V RT   ORDERED BY: MORIS OVALLE     ACC: [id number] EXAM:  XR FEMUR 2 VIEWS RT   ORDERED BY: MORIS OVALLE     ACC: [id number] EXAM:  XR HIP WITH PELV 2-3V RT   ORDERED BY: SHIRA WILKERSON     PROCEDURE DATE:  09/02/2024          INTERPRETATION:  Right hip with pelvis, follow-up right hip, right hip   with pelvis. 3 views. 4 images.    There is multilevel degenerative loss of disc height.    There is moderate right hip degeneration mild left hip degeneration.    There is a oblique fracture of the upper right femoral shaft with slight   comminution and some displacement with overriding of fragments.    Follow-up right hip. 4 views with traction show that the overriding and   displacement have improved.    Right femur. 4 views. There is moderate right knee degeneration. Arterial   calcifications are noted.    IMPRESSION: Upper shaft fracture right femur shows improved alignment   with traction. Hip degeneration right greater than left.    < end of copied text >  < from: Xray Chest 1 View- PORTABLE-Urgent (Xray Chest 1 View- PORTABLE-Urgent .) (09.02.24 @ 18:15) >    ACC: [id number] EXAM:  XR CHEST PORTABLE URGENT 1V   ORDERED BY: MORIS OVALLE     PROCEDURE DATE:  09/02/2024          INTERPRETATION:  TECHNIQUE: Single portable view of the chest.    COMPARISON:  5/23/2019    CLINICAL HISTORY: injury    FINDINGS:    Single frontal view of the chest demonstrates the lungs to be clear. The   cardiomediastinal silhouette is unremarkable enlarged. Dilated aortic   knob. No acute osseous abnormalities. Low lung volumes. Consider chest CT   as clinically noted. Osteopenia.    IMPRESSION: No acute cardiopulmonary disease process.        MEDICATIONS  (STANDING):  acetaminophen     Tablet .. 650 milliGRAM(s) Oral every 6 hours  amLODIPine   Tablet 5 milliGRAM(s) Oral daily  atorvastatin 10 milliGRAM(s) Oral at bedtime  lactated ringers. 1000 milliLiter(s) (75 mL/Hr) IV Continuous <Continuous>  metoprolol tartrate 25 milliGRAM(s) Oral two times a day  pantoprazole    Tablet 40 milliGRAM(s) Oral before breakfast  sucralfate 1 Gram(s) Oral two times a day    MEDICATIONS  (PRN):  HYDROmorphone  Injectable 0.5 milliGRAM(s) IV Push every 4 hours PRN Severe Pain (7 - 10)  ondansetron Injectable 4 milliGRAM(s) IV Push every 6 hours PRN Nausea and/or Vomiting  traMADol 25 milliGRAM(s) Oral every 6 hours PRN Moderate Pain (4 - 6)      69 yo M with hx of CVA, L BKA, lives in SNF, wheelchair bound. Yesterday, he went to the [employer] with his family.  While on hoyar lift getting lifted back into car his R leg was caught and caused right femur fracture    R Proximal femur fracture   -ortho consulted  -Plan for OR today  -pain control  + seen by Cardiology for clearance: cleared from their perspective  EKG    hx of  DVT in the past   -was on xarelto until 2021    history of PAD s/p left popliteal bypass, s/p Left AKA , CAD s/p cabg x4, prior CVAs with left hemiplegia, contracted LUE, bed bound  -hold plavix for now for surgery  -continue lipitor    history of prior GI bleed while on xarelto  -no longer on    Hypertension.  -continue metoprolol  -continue norvasc ( holding parameters hold sbp < 140 )    Disp  pt is medically stable and optimized to proceed with necessary surgical procedure by ortho team for rt femur fracture, although patient is wheel chair/bedbound, however surgical intervention may help with pain and quality of life  he would be at intermediate risk for perioperative complications given significant neuro cardiovascular history with CAD, PAD, CVA, CABG x 4 bypass,  surgery and amputation. his ekg shows no signs of ACS or arrythmia.
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
## 2024-09-03 NOTE — PATIENT PROFILE ADULT - NSPRESCRUSEDDRG_GEN_A_NUR
unknown pt poor historian show Complex Repair And Ftsg Text: The defect edges were debeveled with a #15 scalpel blade.  The primary defect was closed partially with a complex linear closure.  Given the location of the defect, shape of the defect and the proximity to free margins a full thickness skin graft was deemed most appropriate to repair the remaining defect.  The graft was trimmed to fit the size of the remaining defect.  The graft was then placed in the primary defect, oriented appropriately, and sutured into place.

## 2024-09-03 NOTE — DIETITIAN INITIAL EVALUATION ADULT - ADD RECOMMEND
1) ADAT once s/p surgery. Encourage protein-rich foods, maximize food preferences. If PO intake becomes poor during admission can add Ensure Max shakes daily to optimize nutritional needs (provides 150 kcal, 30 g protein/ shake)   2) Monitor bowel movements, if no BM for >3 days, consider implementing bowel regimen.   3) Obtain weekly wt to track/trend changes  4) Consider to obtain vitamin D 25OH level to assess nutriture  5) MVI w/ minerals daily to ensure 100% RDA met   RD will continue to monitor PO intake, labs, hydration, and wt prn.

## 2024-09-03 NOTE — DIETITIAN INITIAL EVALUATION ADULT - NS FNS DIET ORDER
Diet, NPO after Midnight:      NPO Start Date: 02-Sep-2024,   NPO Start Time: 23:59  Except Medications (09-02-24 @ 20:40)

## 2024-09-03 NOTE — CONSULT NOTE ADULT - SUBJECTIVE AND OBJECTIVE BOX
68 year old male with hx of cva left hemiplegia, contracted LUE, CAD, PAD,  hx of cabg, left aka, lives in SNF, wheelchair bound.  [place] and while on [place] lift getting back into car his R leg was caught and caused excruciating pain. He was found to have rt femur fracture. pt is awake, poor historian, denies any complaints. Pt is now POD 0 s/p attempted Right femur IMN for fx. Surgery was terminated as pt became unstable in OR. Wound was closed and pt admitted to Step Down ICU floor. Pt seen at bedside for POC and unable to doppler pulse RLE after about 20-30 minutes. Other Ortho PAs and resident also attempted unsuccessfully. Pt is able to feel and wiggle his toes however. Vascular surgery called to evaluate pt.    Vital Signs Last 24 Hrs  T(C): 36.7 (03 Sep 2024 17:00), Max: 37.1 (03 Sep 2024 07:52)  T(F): 98 (03 Sep 2024 17:00), Max: 98.7 (03 Sep 2024 07:52)  HR: 132 (03 Sep 2024 22:00) (76 - 145)  BP: 107/86 (03 Sep 2024 22:00) (86/63 - 110/76)  BP(mean): 94 (03 Sep 2024 22:00) (72 - 94)  RR: 18 (03 Sep 2024 22:00) (15 - 30)  SpO2: 99% (03 Sep 2024 19:30) (91% - 99%)    Parameters below as of 03 Sep 2024 19:30  Patient On (Oxygen Delivery Method): nasal cannula  O2 Flow (L/min): 2    MEDICATIONS  (STANDING):  acetaminophen     Tablet .. 975 milliGRAM(s) Oral every 8 hours  atorvastatin 10 milliGRAM(s) Oral at bedtime  ceFAZolin  Injectable. 2000 milliGRAM(s) IV Push every 8 hours  lactated ringers. 1000 milliLiter(s) (100 mL/Hr) IV Continuous <Continuous>  pantoprazole    Tablet 40 milliGRAM(s) Oral daily  sucralfate 1 Gram(s) Oral two times a day    MEDICATIONS  (PRN):  magnesium hydroxide Suspension 30 milliLiter(s) Oral daily PRN Constipation  ondansetron Injectable 4 milliGRAM(s) IV Push every 6 hours PRN Nausea and/or Vomiting  oxyCODONE    IR 5 milliGRAM(s) Oral every 4 hours PRN Moderate Pain (4 - 6)  oxyCODONE    IR 10 milliGRAM(s) Oral every 4 hours PRN Severe Pain (7 - 10)  polyethylene glycol 3350 17 Gram(s) Oral at bedtime PRN Constipation  senna 2 Tablet(s) Oral at bedtime PRN Constipation  traMADol 50 milliGRAM(s) Oral every 4 hours PRN Mild Pain (1 - 3)                          12.1   17.36 )-----------( 186      ( 03 Sep 2024 20:06 )             36.0     09-03    142  |  110<H>  |  20  ----------------------------<  137<H>  4.1   |  24  |  1.24    Ca    8.3<L>      03 Sep 2024 20:06  Phos  2.5     09-03  Mg     2.0     09-03    TPro  6.1  /  Alb  2.8<L>  /  TBili  2.5<H>  /  DBili  x   /  AST  25  /  ALT  11<L>  /  AlkPhos  79  09-03    I&O's Summary    02 Sep 2024 07:01  -  03 Sep 2024 07:00  --------------------------------------------------------  IN: 450 mL / OUT: 0 mL / NET: 450 mL    03 Sep 2024 07:01  -  03 Sep 2024 22:49  --------------------------------------------------------  IN: 0 mL / OUT: 800 mL / NET: -800 mL      Physical Exam  General: WD, WN, lethargic                                                         Neuro: A+O x 2, speech with some dysarthria, soft spoken and slow but easily understable  Eyes: PERRL   ENT: Normal exam of nasal/oral mucosa with absence of cyanosis.   Neck: supple   Chest: CTA, good air entry, equal bilaterally, no wheezes/rales/rhonchi, normal excursion, no accessory muscle use noted  CV: regular rate, regular rhythm, +S1S2  GI: soft, NT, ND  Extremities: RLE contracted, hip externally rotated, and R hip dressing C/D/I. L AKA. right hip dressing with strikethrough. R foot cool to ankle. sensorimotor intact.  SKIN: warm, dry, intact   VASC: RLE 4/4 dopplerable femoral, 2/4 dopplerable popliteal, 2/4 dopplerable AT, nondopplerable PT/DP
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
## 2024-09-03 NOTE — DISCHARGE NOTE PROVIDER - NSDCCPTREATMENT_GEN_ALL_CORE_FT
PRINCIPAL PROCEDURE  Procedure: ORIF, hip, with intramedullary nail  Findings and Treatment: RIGHT
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
## 2024-09-03 NOTE — DIETITIAN INITIAL EVALUATION ADULT - PERTINENT MEDS FT
MEDICATIONS  (STANDING):  acetaminophen     Tablet .. 650 milliGRAM(s) Oral every 6 hours  amLODIPine   Tablet 5 milliGRAM(s) Oral daily  atorvastatin 10 milliGRAM(s) Oral at bedtime  lactated ringers. 1000 milliLiter(s) (75 mL/Hr) IV Continuous <Continuous>  metoprolol tartrate 25 milliGRAM(s) Oral two times a day  pantoprazole    Tablet 40 milliGRAM(s) Oral before breakfast  sucralfate 1 Gram(s) Oral two times a day    MEDICATIONS  (PRN):  HYDROmorphone  Injectable 0.5 milliGRAM(s) IV Push every 4 hours PRN Severe Pain (7 - 10)  ondansetron Injectable 4 milliGRAM(s) IV Push every 6 hours PRN Nausea and/or Vomiting  traMADol 25 milliGRAM(s) Oral every 6 hours PRN Moderate Pain (4 - 6)

## 2024-09-03 NOTE — PROGRESS NOTE ADULT - SUBJECTIVE AND OBJECTIVE BOX
Orthopedics     POD 0 s/p attempted Right femur IMN for fx. Surgery was terminated as pt became unstable in OR. Wound was closed and pt admitted to Step Down ICU floor. Pt seen at bedside for POC and unable to doppler pulse RLE after about 20-30 minutes. Other Ortho PAs and resident also attempted unsuccessfully. Pt is able to feel and wiggle his toes however. We called DR Chambers who suggested vascular consult. We called vascular resident and ordered CTA for the RLE.   Pain is controlled. No nausea or vomiting.     Vital Signs Last 24 Hrs  T(C): 36.7 (09-03-24 @ 17:00), Max: 37.1 (09-03-24 @ 07:52)  T(F): 98 (09-03-24 @ 17:00), Max: 98.7 (09-03-24 @ 07:52)  HR: 121 (09-03-24 @ 18:10) (76 - 145)  BP: 86/63 (09-03-24 @ 18:10) (86/63 - 132/90)  BP(mean): 72 (09-03-24 @ 18:10) (72 - 72)  RR: 30 (09-03-24 @ 18:10) (17 - 30)  SpO2: 95% (09-03-24 @ 18:10) (91% - 99%)                        12.1   17.36 )-----------( 186      ( 03 Sep 2024 20:06 )             36.0       PT/INR - ( 03 Sep 2024 17:40 )   PT: 14.0 sec;   INR: 1.25 ratio         PTT - ( 03 Sep 2024 17:40 )  PTT:33.7 sec    Exam:  NAD AAOx3  Dressing saturated  +EHL FHL  SILT toes 1-5  Calf Soft NT    A/P:  POD 0 s/p attempted Right femur IMN for fx. Surgery was terminated as pt became unstable in OR. Wound was closed and pt admitted to Step Down ICU floor. Pt seen at bedside for POC and unable to doppler pulse RLE after about 20-30 minutes. Other Ortho PAs and resident also attempted unsuccessfully. Pt is able to feel and wiggle his toes however. We called DR Chambers who suggested vascular consult. We called vascular resident and ordered CTA for the RLE.   -New bandage applied sterile mepilex  -CTA  -FU Vascular consult called and spoke with Resident  -Analgesia  -DVT PE ppx  -OOB PT

## 2024-09-03 NOTE — DISCHARGE NOTE PROVIDER - NSDCACTIVITY_GEN_ALL_CORE
Showering allowed/Stairs allowed/Walking - Indoors allowed/Walking - Outdoors allowed/Follow Instructions Provided by your Surgical Team Follow Instructions Provided by your Surgical Team

## 2024-09-03 NOTE — DISCHARGE NOTE PROVIDER - CARE PROVIDER_API CALL
Alexis Chambers [place]  Orthopaedic Surgery  [address] [place] [address], [facility] [place] [address]  Phone: [phone]  Fax: [phone]  Follow Up Time:    Alexis Chambers  Orthopaedic Surgery  [address], [place] [address]  Phone: [phone]  Fax: [phone]  Follow Up Time:     Zaid Blanc  Cardiovascular Disease  [address], [address]  [place] [address]  Phone: [phone]  Fax: [phone]  Follow Up Time:     Roel Turcios  Vascular Surgery  [address], [address]  [place] [address]  Phone: [phone]  Fax: [phone]  Follow Up Time:

## 2024-09-03 NOTE — DIETITIAN INITIAL EVALUATION ADULT - PERTINENT LABORATORY DATA
09-03    137  |  107  |  19  ----------------------------<  113<H>  4.2   |  26  |  0.92    Ca    8.8      03 Sep 2024 04:26    TPro  7.3  /  Alb  3.3  /  TBili  1.1  /  DBili  x   /  AST  23  /  ALT  13  /  AlkPhos  100  09-03

## 2024-09-04 ENCOUNTER — RESULT REVIEW (OUTPATIENT)
Age: 69
End: 2024-09-04

## 2024-09-04 DIAGNOSIS — I47.10 SUPRAVENTRICULAR TACHYCARDIA, UNSPECIFIED: ICD-10-CM

## 2024-09-04 LAB
ADD ON TEST-SPECIMEN IN LAB: SIGNIFICANT CHANGE UP
ANION GAP SERPL CALC-SCNC: 5 MMOL/L — SIGNIFICANT CHANGE UP (ref 5–17)
APTT BLD: 34.1 SEC — SIGNIFICANT CHANGE UP (ref 24.5–35.6)
BUN SERPL-MCNC: 21 MG/DL — SIGNIFICANT CHANGE UP (ref 7–23)
CALCIUM SERPL-MCNC: 8.2 MG/DL — LOW (ref 8.5–10.1)
CHLORIDE SERPL-SCNC: 110 MMOL/L — HIGH (ref 96–108)
CO2 SERPL-SCNC: 23 MMOL/L — SIGNIFICANT CHANGE UP (ref 22–31)
CREAT SERPL-MCNC: 0.92 MG/DL — SIGNIFICANT CHANGE UP (ref 0.5–1.3)
EGFR: 89 ML/MIN/1.73M2 — SIGNIFICANT CHANGE UP
FIBRINOGEN PPP-MCNC: 430 MG/DL — SIGNIFICANT CHANGE UP (ref 200–435)
GLUCOSE SERPL-MCNC: 132 MG/DL — HIGH (ref 70–99)
HCT VFR BLD CALC: 24.5 % — LOW (ref 39–50)
HCT VFR BLD CALC: 27.3 % — LOW (ref 39–50)
HCT VFR BLD CALC: 27.8 % — LOW (ref 39–50)
HCT VFR BLD CALC: 29.6 % — LOW (ref 39–50)
HGB BLD-MCNC: 10.1 G/DL — LOW (ref 13–17)
HGB BLD-MCNC: 8.2 G/DL — LOW (ref 13–17)
HGB BLD-MCNC: 9.3 G/DL — LOW (ref 13–17)
HGB BLD-MCNC: 9.5 G/DL — LOW (ref 13–17)
INR BLD: 1.27 RATIO — HIGH (ref 0.85–1.18)
LACTATE SERPL-SCNC: 2.3 MMOL/L — HIGH (ref 0.7–2)
MAGNESIUM SERPL-MCNC: 2 MG/DL — SIGNIFICANT CHANGE UP (ref 1.6–2.6)
MCHC RBC-ENTMCNC: 29.3 PG — SIGNIFICANT CHANGE UP (ref 27–34)
MCHC RBC-ENTMCNC: 29.6 PG — SIGNIFICANT CHANGE UP (ref 27–34)
MCHC RBC-ENTMCNC: 29.7 PG — SIGNIFICANT CHANGE UP (ref 27–34)
MCHC RBC-ENTMCNC: 30.2 PG — SIGNIFICANT CHANGE UP (ref 27–34)
MCHC RBC-ENTMCNC: 33.5 GM/DL — SIGNIFICANT CHANGE UP (ref 32–36)
MCHC RBC-ENTMCNC: 34.1 GM/DL — SIGNIFICANT CHANGE UP (ref 32–36)
MCHC RBC-ENTMCNC: 34.1 GM/DL — SIGNIFICANT CHANGE UP (ref 32–36)
MCHC RBC-ENTMCNC: 34.2 GM/DL — SIGNIFICANT CHANGE UP (ref 32–36)
MCV RBC AUTO: 86.8 FL — SIGNIFICANT CHANGE UP (ref 80–100)
MCV RBC AUTO: 86.9 FL — SIGNIFICANT CHANGE UP (ref 80–100)
MCV RBC AUTO: 87.5 FL — SIGNIFICANT CHANGE UP (ref 80–100)
MCV RBC AUTO: 88.6 FL — SIGNIFICANT CHANGE UP (ref 80–100)
MRSA PCR RESULT.: SIGNIFICANT CHANGE UP
PHOSPHATE SERPL-MCNC: 2.3 MG/DL — LOW (ref 2.5–4.5)
PLATELET # BLD AUTO: 121 K/UL — LOW (ref 150–400)
PLATELET # BLD AUTO: 133 K/UL — LOW (ref 150–400)
PLATELET # BLD AUTO: 147 K/UL — LOW (ref 150–400)
PLATELET # BLD AUTO: 159 K/UL — SIGNIFICANT CHANGE UP (ref 150–400)
POTASSIUM SERPL-MCNC: 4.2 MMOL/L — SIGNIFICANT CHANGE UP (ref 3.5–5.3)
POTASSIUM SERPL-SCNC: 4.2 MMOL/L — SIGNIFICANT CHANGE UP (ref 3.5–5.3)
PROTHROM AB SERPL-ACNC: 14.2 SEC — HIGH (ref 9.5–13)
RBC # BLD: 2.8 M/UL — LOW (ref 4.2–5.8)
RBC # BLD: 3.08 M/UL — LOW (ref 4.2–5.8)
RBC # BLD: 3.2 M/UL — LOW (ref 4.2–5.8)
RBC # BLD: 3.41 M/UL — LOW (ref 4.2–5.8)
RBC # FLD: 14.4 % — SIGNIFICANT CHANGE UP (ref 10.3–14.5)
RBC # FLD: 14.4 % — SIGNIFICANT CHANGE UP (ref 10.3–14.5)
RBC # FLD: 14.5 % — SIGNIFICANT CHANGE UP (ref 10.3–14.5)
RBC # FLD: 14.6 % — HIGH (ref 10.3–14.5)
S AUREUS DNA NOSE QL NAA+PROBE: DETECTED
SODIUM SERPL-SCNC: 138 MMOL/L — SIGNIFICANT CHANGE UP (ref 135–145)
WBC # BLD: 10.54 K/UL — HIGH (ref 3.8–10.5)
WBC # BLD: 11.07 K/UL — HIGH (ref 3.8–10.5)
WBC # BLD: 9.09 K/UL — SIGNIFICANT CHANGE UP (ref 3.8–10.5)
WBC # BLD: 9.4 K/UL — SIGNIFICANT CHANGE UP (ref 3.8–10.5)
WBC # FLD AUTO: 10.54 K/UL — HIGH (ref 3.8–10.5)
WBC # FLD AUTO: 11.07 K/UL — HIGH (ref 3.8–10.5)
WBC # FLD AUTO: 9.09 K/UL — SIGNIFICANT CHANGE UP (ref 3.8–10.5)
WBC # FLD AUTO: 9.4 K/UL — SIGNIFICANT CHANGE UP (ref 3.8–10.5)

## 2024-09-04 PROCEDURE — 99233 SBSQ HOSP IP/OBS HIGH 50: CPT

## 2024-09-04 PROCEDURE — 93926 LOWER EXTREMITY STUDY: CPT | Mod: 26,RT

## 2024-09-04 PROCEDURE — 71045 X-RAY EXAM CHEST 1 VIEW: CPT | Mod: 26

## 2024-09-04 PROCEDURE — 93010 ELECTROCARDIOGRAM REPORT: CPT

## 2024-09-04 PROCEDURE — 93306 TTE W/DOPPLER COMPLETE: CPT | Mod: 26

## 2024-09-04 RX ORDER — MIDODRINE HYDROCHLORIDE 5 MG/1
10 TABLET ORAL EVERY 8 HOURS
Refills: 0 | Status: DISCONTINUED | OUTPATIENT
Start: 2024-09-04 | End: 2024-09-06

## 2024-09-04 RX ORDER — SODIUM CHLORIDE 9 MG/ML
1000 INJECTION INTRAMUSCULAR; INTRAVENOUS; SUBCUTANEOUS
Refills: 0 | Status: DISCONTINUED | OUTPATIENT
Start: 2024-09-04 | End: 2024-09-04

## 2024-09-04 RX ORDER — SODIUM PHOSPHATE, DIBASIC, ANHYDROUS, POTASSIUM PHOSPHATE, MONOBASIC, AND SODIUM PHOSPHATE, MONOBASIC, MONOHYDRATE 852; 155; 130 MG/1; MG/1; MG/1
1 TABLET, COATED ORAL ONCE
Refills: 0 | Status: COMPLETED | OUTPATIENT
Start: 2024-09-04 | End: 2024-09-04

## 2024-09-04 RX ORDER — METOPROLOL TARTRATE 100 MG/1
12.5 TABLET ORAL EVERY 12 HOURS
Refills: 0 | Status: DISCONTINUED | OUTPATIENT
Start: 2024-09-04 | End: 2024-09-07

## 2024-09-04 RX ORDER — CHLORHEXIDINE GLUCONATE 40 MG/ML
1 SOLUTION TOPICAL
Refills: 0 | Status: DISCONTINUED | OUTPATIENT
Start: 2024-09-04 | End: 2024-09-11

## 2024-09-04 RX ADMIN — Medication 40 MILLIGRAM(S): at 08:41

## 2024-09-04 RX ADMIN — SODIUM PHOSPHATE, DIBASIC, ANHYDROUS, POTASSIUM PHOSPHATE, MONOBASIC, AND SODIUM PHOSPHATE, MONOBASIC, MONOHYDRATE 1 PACKET(S): 852; 155; 130 TABLET, COATED ORAL at 12:12

## 2024-09-04 RX ADMIN — ACETAMINOPHEN 975 MILLIGRAM(S): 325 TABLET ORAL at 22:32

## 2024-09-04 RX ADMIN — ACETAMINOPHEN 975 MILLIGRAM(S): 325 TABLET ORAL at 22:02

## 2024-09-04 RX ADMIN — CHLORHEXIDINE GLUCONATE 1 APPLICATION(S): 40 SOLUTION TOPICAL at 15:13

## 2024-09-04 RX ADMIN — METOPROLOL TARTRATE 25 MILLIGRAM(S): 100 TABLET ORAL at 08:41

## 2024-09-04 RX ADMIN — METOPROLOL TARTRATE 12.5 MILLIGRAM(S): 100 TABLET ORAL at 22:02

## 2024-09-04 RX ADMIN — ACETAMINOPHEN 975 MILLIGRAM(S): 325 TABLET ORAL at 05:52

## 2024-09-04 RX ADMIN — Medication 10 MILLIGRAM(S): at 22:02

## 2024-09-04 RX ADMIN — ACETAMINOPHEN 975 MILLIGRAM(S): 325 TABLET ORAL at 14:45

## 2024-09-04 RX ADMIN — MIDODRINE HYDROCHLORIDE 10 MILLIGRAM(S): 5 TABLET ORAL at 22:02

## 2024-09-04 RX ADMIN — ACETAMINOPHEN 975 MILLIGRAM(S): 325 TABLET ORAL at 13:45

## 2024-09-04 RX ADMIN — Medication 1000 MILLILITER(S): at 16:40

## 2024-09-04 RX ADMIN — OXYCODONE HYDROCHLORIDE 10 MILLIGRAM(S): 5 TABLET ORAL at 08:17

## 2024-09-04 RX ADMIN — Medication 1000 MILLILITER(S): at 15:14

## 2024-09-04 RX ADMIN — ACETAMINOPHEN 975 MILLIGRAM(S): 325 TABLET ORAL at 05:14

## 2024-09-04 RX ADMIN — OXYCODONE HYDROCHLORIDE 10 MILLIGRAM(S): 5 TABLET ORAL at 09:15

## 2024-09-04 RX ADMIN — SUCRALFATE 1 GRAM(S): 1 SUSPENSION ORAL at 08:41

## 2024-09-04 RX ADMIN — CEFAZOLIN SODIUM 2000 MILLIGRAM(S): 2 INJECTION, SOLUTION INTRAVENOUS at 05:14

## 2024-09-04 RX ADMIN — MIDODRINE HYDROCHLORIDE 10 MILLIGRAM(S): 5 TABLET ORAL at 12:09

## 2024-09-04 RX ADMIN — Medication 75 MILLILITER(S): at 15:13

## 2024-09-04 RX ADMIN — SUCRALFATE 1 GRAM(S): 1 SUSPENSION ORAL at 22:02

## 2024-09-04 RX ADMIN — OXYCODONE HYDROCHLORIDE 5 MILLIGRAM(S): 5 TABLET ORAL at 13:43

## 2024-09-04 RX ADMIN — OXYCODONE HYDROCHLORIDE 5 MILLIGRAM(S): 5 TABLET ORAL at 13:45

## 2024-09-04 NOTE — PROVIDER CONTACT NOTE (CHANGE IN STATUS NOTIFICATION) - SITUATION
Pt received back from CTA. Pt had BM and being cleaned - HR increased 230s. Increased swelling and bright red blood noted draining from right hip surgical site.
RLE cold, no pulses noted with doppler. Right groin pulse palpable only.  Right upper leg mepilex dressing saturated with bright red bloody drainage.

## 2024-09-04 NOTE — PROVIDER CONTACT NOTE (CHANGE IN STATUS NOTIFICATION) - ASSESSMENT
Alert, oriented to self/place and year. HR 120s-140s. Right radial a-line 113/67 non invasive 98/64. Oxygen saturation 96% on 2L NC.
RLE cold with faint dopper pedal pulse

## 2024-09-04 NOTE — PROGRESS NOTE ADULT - SUBJECTIVE AND OBJECTIVE BOX
Pt seen at bedside overnight. No acute complaints, pt denies any pain in R foot or calf at this time. Denies numbness, tingling, fevers, chills, CP, SOB, N/V/D.    Vital Signs Last 24 Hrs  T(C): 36.7 (03 Sep 2024 23:05), Max: 37.1 (03 Sep 2024 07:52)  T(F): 98 (03 Sep 2024 23:05), Max: 98.7 (03 Sep 2024 07:52)  HR: 120 (04 Sep 2024 00:00) (76 - 233)  BP: 100/79 (04 Sep 2024 00:00) (78/61 - 110/76)  BP(mean): 87 (04 Sep 2024 00:00) (68 - 94)  RR: 24 (04 Sep 2024 00:00) (15 - 30)  SpO2: 96% (03 Sep 2024 23:05) (91% - 99%)    Parameters below as of 03 Sep 2024 19:30  Patient On (Oxygen Delivery Method): nasal cannula  O2 Flow (L/min): 2                          10.3   14.49 )-----------( 153      ( 03 Sep 2024 23:25 )             29.5     09-03    142  |  110<H>  |  20  ----------------------------<  137<H>  4.1   |  24  |  1.24    Ca    8.3<L>      03 Sep 2024 20:06  Phos  2.5     09-03  Mg     2.0     09-03    TPro  6.1  /  Alb  2.8<L>  /  TBili  2.5<H>  /  DBili  x   /  AST  25  /  ALT  11<L>  /  AlkPhos  79  09-03    I&O's Summary    02 Sep 2024 07:01  -  03 Sep 2024 07:00  --------------------------------------------------------  IN: 450 mL / OUT: 0 mL / NET: 450 mL    03 Sep 2024 07:01  -  04 Sep 2024 03:55  --------------------------------------------------------  IN: 0 mL / OUT: 800 mL / NET: -800 mL        Physical Exam  General: WD, WN, lethargic                                                         Neuro: A+O x 2, speech with some dysarthria, soft spoken and slow but easily understable  Eyes: PERRL   ENT: Normal exam of nasal/oral mucosa with absence of cyanosis.   Neck: supple   Chest: CTA, good air entry, equal bilaterally, no wheezes/rales/rhonchi, normal excursion, no accessory muscle use noted  CV: regular rate, regular rhythm, +S1S2  GI: soft, NT, ND  Extremities: RLE contracted, hip externally rotated, and R hip dressing C/D/I. L AKA. right hip dressing with strikethrough. R foot cool to ankle. sensorimotor intact.  SKIN: warm, dry, intact   VASC: RLE 4/4 dopplerable femoral, 2/4 dopplerable popliteal, 2/4 dopplerable AT, nondopplerable PT/DP

## 2024-09-04 NOTE — PROGRESS NOTE ADULT - ASSESSMENT
ASSESSMENT   ASSESSMENT  67yo male with PMHx CVA residual left hemiplegia, contracted LE. s/p L AKA, CAD s/p CABG, PVD, lives in SNF, wheelchair bound.    [place] the mall with his [place].  While on hoyar lift getting back into car his R leg was caught and caused excruciating pain causing him to become diaphoretic and vomiting  Found to have rt femur fractrue    Taken to the OR on 9/3 for placement of R trochanteric nail into R hip however procedure aborted due to patient instability and bleeding  EBL 800cc, received 2u pRBC intra op    in PACU patient was reported to be lethargic,   code stroke called. CTH neg  Patient mentation improved, likely anesthesia and hypotension related    Had episode of SVT overnight 200s and hypotension. resolved with IV lopressor 5mg x 1 and IV fluids    PLAN    Neuro: AAOx 3. pain control prn IV tylenol, oxycodone  CV: BP low normal, in sinus rhythm with freq PACs -120s. will lower lopressor PO dose 25mg bid -> 12.5mg bid. midodrine 10mg TID added to allow BB to be given for rate control. TTE ordered. cardio reconsulted.   Pulm: on room air.   GI: PO diet. NPO after MN for possible return to OR tomorrow. PPI. bowel regimen prn  Nephro: cont NS @ 100 as pt appears dry. monitor I & Os. Trend renal fxn  Vasc: vasc consulted appreciated weak doppler pulses likely 2/2 hx of PAD.   ID: no abx indicated at this time. afebrile. leukocytosis likely related to trauma, surgery.   Heme: Hgb drifting down. Transfuse for Hgb < 8. no chemical DVT ppx at this time due to bleeding risk     Dispo: SICU. pain control. IV fluids. f/u TTE. Trend H/H    Will discuss with Dr. Sow   ASSESSMENT  69yo male with PMHx CVA residual left hemiplegia, contracted LE. s/p L AKA, CAD s/p CABG, PVD, lives in SNF, wheelchair bound.    went [place] the mall with his [place].  While on hoyar lift getting back into car his R leg was caught and caused excruciating pain causing him to become diaphoretic and vomiting  Found to have rt femur fractrue    Taken to the OR on 9/3 for placement of R trochanteric nail into R hip however procedure aborted due to patient instability and bleeding  EBL 800cc, received 2u pRBC intra op    in PACU patient was reported to be lethargic,   code stroke called. CTH neg  Patient mentation improved, likely anesthesia and hypotension related    Had episode of SVT overnight 200s and hypotension. resolved with IV lopressor 5mg x 1 and IV fluids    PLAN    Neuro: AAOx 3. pain control prn IV tylenol, oxycodone  CV: BP low normal, in sinus rhythm with freq PACs -120s. will lower lopressor PO dose 25mg bid -> 12.5mg bid. midodrine 10mg TID added to allow BB to be given for rate control. TTE ordered. cardio reconsulted.   Pulm: on room air.   GI: PO diet. NPO after MN for possible return to OR tomorrow. PPI. bowel regimen prn  Nephro: cont NS @ 100 as pt appears dry. monitor I & Os. Trend renal fxn  Vasc: vasc consulted appreciated weak doppler pulses likely 2/2 hx of PAD.   ID: no abx indicated at this time. afebrile. leukocytosis likely related to trauma, surgery.   Heme: Hgb drifting down. Transfuse for Hgb < 8. no chemical DVT ppx at this time due to bleeding risk     Dispo: SICU. pain control. IV fluids. f/u TTE. Trend H/H    Will discuss with Dr. Sow    Patient re-assessed in afternoon. BP still low 89/60s MAP 62-65.  Additional 500cc LR bolus x 2  Repeat CBC 16:00         ASSESSMENT  67yo male with PMHx CVA residual left hemiplegia, contracted LE. s/p L AKA, CAD s/p CABG, PVD, lives in SNF, wheelchair bound.    went [place] the mall with his family.  While on hoyar lift getting back into car his R leg was caught and caused excruciating pain causing him to become diaphoretic and vomiting  Found to have rt femur fractrue    Taken to the OR on 9/3 for placement of R trochanteric nail into R hip however procedure aborted due to patient instability and bleeding  EBL 800cc, received 2u pRBC intra op    in PACU patient was reported to be lethargic,   code stroke called. CTH neg  Patient mentation improved, likely anesthesia and hypotension related    Had episode of SVT overnight 200s and hypotension. resolved with IV lopressor 5mg x 1 and IV fluids    PLAN    Neuro: AAOx 3. pain control prn IV tylenol, oxycodone  CV: BP low normal, in sinus rhythm with freq PACs -120s. will lower lopressor PO dose 25mg bid -> 12.5mg bid. midodrine 10mg TID added to allow BB to be given for rate control. TTE ordered. cardio reconsulted.   Pulm: on room air.   GI: PO diet. NPO after MN for possible return to OR tomorrow. PPI. bowel regimen prn  Nephro: cont NS @ 100 as pt appears dry. monitor I & Os. Trend renal fxn  Vasc: vasc consulted appreciated weak doppler pulses likely 2/2 hx of PAD.   ID: no abx indicated at this time. afebrile. leukocytosis likely related to trauma, surgery.   Heme: Hgb drifting down. Transfuse for Hgb < 8. no chemical DVT ppx at this time due to bleeding risk     Dispo: SICU. pain control. IV fluids. f/u TTE. Trend H/H    Will discuss with Dr. Sow    Patient re-assessed in afternoon. BP still low 89/60s MAP 62-65.  Additional 500cc LR bolus x 2  Repeat CBC 16:00 -> Hgb 9.5 at 10am, now 8.2 and still hypotensive. Will transfuse 1u pRBC.
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
## 2024-09-04 NOTE — PROVIDER CONTACT NOTE (CHANGE IN STATUS NOTIFICATION) - ACTION/TREATMENT ORDERED:
Critical care BENNY Kelley at bedside - orders for lopressor 5mg ivp, 500 ml LR bolus, stat cbc, EKG. Pt placed in trandelenburg. Ortho team notified.
Critical care came to assess patient and ortho team now here at this time assessing patient.

## 2024-09-04 NOTE — PROVIDER CONTACT NOTE (CHANGE IN STATUS NOTIFICATION) - NAME OF MD/NP/PA/DO NOTIFIED:
Care Coordination Telephone Call  GI Service and Surgical Oncology    Called patient to discuss to respond to her voice mail that she had severe abdominal pain that did not go away a couple of days ago, similar to last pain episode.  Pain was 9/10.  She took another bottle of Mag Citrate and pain went away and has not returned but she called because she was worried about it.  I have discussed with Dr. Metz and informed Kitty that we will check Lipase on Monday prior to her appointments.  If she has pain that does not go away, fevers or chills, nausea or vomiting she is to come to [facility].  She voiced understanding.    I have asked the patient to call with any additional questions or concerns and have provided my contact information.    Plan:  Clinic visit with Dr. Monk and Rochelle on Monday    Emmanuelle GILLETTEN, HNBC, STAR-T  RN Care Coordinator  Surgical Oncology and GI service  Ph: [phone]  FAX: [phone]          
rapid response called BENNY Kelley
Dr. Vega and critical care PA Micah and orthopedic residents

## 2024-09-04 NOTE — PROGRESS NOTE ADULT - ASSESSMENT
68M with hx of PAD s/p R femur IMN complicated by intraoperative instability   vascular surgery called to evaluate pulse changes postoperatively  CT RLE: < from: CT Angio Lower Extremity w/ IV Cont, Right (09.03.24 @ 22:50) >  IMPRESSION:  No flow-limiting stenosis and patent visualized arteries to the   below-knee popliteal. The origin of the right common iliac artery is not   included on the study; inflow disease at this location would not be   detected.    While patent, there is delayed filling of the distal SFA and popliteal   which could represent inflow disease (though no etiology for inflow   disease is visible on these images) or sequela of poor cardiac output.    Only a small amount of contrast is visible within the tibial peroneal   trunk and proximal couple of centimeters of the anterior and posterior   tibial arteries, with no contrast visible distal to this point including   on the delayed phase images. These calf arteries might be occluded or   have very slow nondetectable flow.    Acute comminuted fracture through the proximal femoral shaftagain   demonstrated.    1.5 cm aneurysm of the above knee popliteal.      no active bleeding noted on imaging nor any signs of acute limb ischemia  vascular exam is likely due to chronic PAD exacerbated by low flow state/hemodynamic instability  recommend conservative mgmt at this time  no vascular surgery intervention

## 2024-09-04 NOTE — PROGRESS NOTE ADULT - PROBLEM SELECTOR PLAN 1
Currently in SR with frequent atrial ectopy.  IF develops SVT intraoperatively, would give amiodarone 300mg IV over 10 min.

## 2024-09-04 NOTE — PROGRESS NOTE ADULT - SUBJECTIVE AND OBJECTIVE BOX
Pt seen at bedside this AM. No acute complaints, pain is well managed. Denies numbness, tingling, fevers, chills, CP, SOB, N/V/D.    Vital Signs (24 Hrs):  T(C): 36.9 (09-04-24 @ 05:00), Max: 37.1 (09-03-24 @ 07:52)  HR: 105 (09-04-24 @ 04:00) (76 - 233)  BP: 85/69 (09-04-24 @ 04:00) (78/61 - 107/86)  RR: 18 (09-04-24 @ 04:00) (15 - 30)  SpO2: 98% (09-04-24 @ 04:00) (91% - 99%)  Wt(kg): --    LABS:                          10.1   11.07 )-----------( 159      ( 04 Sep 2024 04:30 )             29.6     09-04    138  |  110<H>  |  21  ----------------------------<  132<H>  4.2   |  23  |  0.92    Ca    8.2<L>      04 Sep 2024 04:30  Phos  2.5     09-03  Mg     2.0     09-03    TPro  6.1  /  Alb  2.8<L>  /  TBili  2.5<H>  /  DBili  x   /  AST  25  /  ALT  11<L>  /  AlkPhos  79  09-03    LIVER FUNCTIONS - ( 03 Sep 2024 20:06 )  Alb: 2.8 g/dL / Pro: 6.1 gm/dL / ALK PHOS: 79 U/L / ALT: 11 U/L / AST: 25 U/L / GGT: x           PT/INR - ( 04 Sep 2024 04:30 )   PT: 14.2 sec;   INR: 1.27 ratio         PTT - ( 04 Sep 2024 04:30 )  PTT:34.1 sec    RLE:  Dressings with strike through  Compartments soft, compressible  Calves nontender  No pain with passive stretch  Motor: firing EHL with mild dorsiflexion and plantarflexion seen  SILT: SPN, DPN, Tib, Saph, Shruthi  Unable to palpate PT/DP pulses    A/P:  68yMale with R proximal femur fracture, procedure aborted 2/2 patient instability 9/3/24    Plan for OR tomorrow with Jenelle Chambers and Stephen  NWB RLE  PT/OT  Analgesics  Rest, ice, compress elevate extremity as needed  NPO after midnight, except medications  Followup AM labs  Hold chemical DVT ppx after midnight, SCDs OK  Please re-document medical and cardiac clearance/optimization for OR  Will discuss plan with Dr. Chambers and adjust plan as necessary
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
## 2024-09-04 NOTE — PROGRESS NOTE ADULT - PROBLEM SELECTOR PLAN 2
Low BP is contraindication for return to OR - unlikely to tolerate anesthesia.  Needs volume expansion. Transfuse as needed to keep HGB>8 preoperatively.   TTE pending. Low BP is a contraindication for return to OR - unlikely to tolerate anesthesia.  Needs volume expansion. Transfuse as needed to keep HGB>8 preoperatively.   TTE images reviewed in person. LV systolic function is normal.
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
## 2024-09-05 LAB
ADD ON TEST-SPECIMEN IN LAB: SIGNIFICANT CHANGE UP
ALBUMIN SERPL ELPH-MCNC: 2.1 G/DL — LOW (ref 3.3–5)
ANION GAP SERPL CALC-SCNC: 7 MMOL/L — SIGNIFICANT CHANGE UP (ref 5–17)
ANION GAP SERPL CALC-SCNC: 9 MMOL/L — SIGNIFICANT CHANGE UP (ref 5–17)
APTT BLD: 28.1 SEC — SIGNIFICANT CHANGE UP (ref 24.5–35.6)
APTT BLD: 32.3 SEC — SIGNIFICANT CHANGE UP (ref 24.5–35.6)
BUN SERPL-MCNC: 20 MG/DL — SIGNIFICANT CHANGE UP (ref 7–23)
BUN SERPL-MCNC: 22 MG/DL — SIGNIFICANT CHANGE UP (ref 7–23)
CALCIUM SERPL-MCNC: 7.7 MG/DL — LOW (ref 8.5–10.1)
CALCIUM SERPL-MCNC: 7.9 MG/DL — LOW (ref 8.5–10.1)
CHLORIDE SERPL-SCNC: 111 MMOL/L — HIGH (ref 96–108)
CHLORIDE SERPL-SCNC: 111 MMOL/L — HIGH (ref 96–108)
CO2 SERPL-SCNC: 21 MMOL/L — LOW (ref 22–31)
CO2 SERPL-SCNC: 24 MMOL/L — SIGNIFICANT CHANGE UP (ref 22–31)
CREAT SERPL-MCNC: 0.76 MG/DL — SIGNIFICANT CHANGE UP (ref 0.5–1.3)
CREAT SERPL-MCNC: 0.81 MG/DL — SIGNIFICANT CHANGE UP (ref 0.5–1.3)
EGFR: 95 ML/MIN/1.73M2 — SIGNIFICANT CHANGE UP
EGFR: 97 ML/MIN/1.73M2 — SIGNIFICANT CHANGE UP
GLUCOSE SERPL-MCNC: 112 MG/DL — HIGH (ref 70–99)
GLUCOSE SERPL-MCNC: 98 MG/DL — SIGNIFICANT CHANGE UP (ref 70–99)
HCT VFR BLD CALC: 24.1 % — LOW (ref 39–50)
HCT VFR BLD CALC: 25.6 % — LOW (ref 39–50)
HCT VFR BLD CALC: 31.7 % — LOW (ref 39–50)
HGB BLD-MCNC: 11 G/DL — LOW (ref 13–17)
HGB BLD-MCNC: 8.2 G/DL — LOW (ref 13–17)
HGB BLD-MCNC: 8.7 G/DL — LOW (ref 13–17)
INR BLD: 1.11 RATIO — SIGNIFICANT CHANGE UP (ref 0.85–1.18)
INR BLD: 1.25 RATIO — HIGH (ref 0.85–1.18)
MCHC RBC-ENTMCNC: 30 PG — SIGNIFICANT CHANGE UP (ref 27–34)
MCHC RBC-ENTMCNC: 30.4 PG — SIGNIFICANT CHANGE UP (ref 27–34)
MCHC RBC-ENTMCNC: 31.3 PG — SIGNIFICANT CHANGE UP (ref 27–34)
MCHC RBC-ENTMCNC: 34 GM/DL — SIGNIFICANT CHANGE UP (ref 32–36)
MCHC RBC-ENTMCNC: 34 GM/DL — SIGNIFICANT CHANGE UP (ref 32–36)
MCHC RBC-ENTMCNC: 34.7 GM/DL — SIGNIFICANT CHANGE UP (ref 32–36)
MCV RBC AUTO: 88.3 FL — SIGNIFICANT CHANGE UP (ref 80–100)
MCV RBC AUTO: 89.3 FL — SIGNIFICANT CHANGE UP (ref 80–100)
MCV RBC AUTO: 90.3 FL — SIGNIFICANT CHANGE UP (ref 80–100)
PLATELET # BLD AUTO: 118 K/UL — LOW (ref 150–400)
PLATELET # BLD AUTO: 122 K/UL — LOW (ref 150–400)
PLATELET # BLD AUTO: 128 K/UL — LOW (ref 150–400)
POTASSIUM SERPL-MCNC: 3.4 MMOL/L — LOW (ref 3.5–5.3)
POTASSIUM SERPL-MCNC: 3.5 MMOL/L — SIGNIFICANT CHANGE UP (ref 3.5–5.3)
POTASSIUM SERPL-SCNC: 3.4 MMOL/L — LOW (ref 3.5–5.3)
POTASSIUM SERPL-SCNC: 3.5 MMOL/L — SIGNIFICANT CHANGE UP (ref 3.5–5.3)
PROTHROM AB SERPL-ACNC: 12.5 SEC — SIGNIFICANT CHANGE UP (ref 9.5–13)
PROTHROM AB SERPL-ACNC: 14 SEC — HIGH (ref 9.5–13)
RBC # BLD: 2.7 M/UL — LOW (ref 4.2–5.8)
RBC # BLD: 2.9 M/UL — LOW (ref 4.2–5.8)
RBC # BLD: 3.51 M/UL — LOW (ref 4.2–5.8)
RBC # FLD: 14.3 % — SIGNIFICANT CHANGE UP (ref 10.3–14.5)
RBC # FLD: 14.5 % — SIGNIFICANT CHANGE UP (ref 10.3–14.5)
RBC # FLD: 14.6 % — HIGH (ref 10.3–14.5)
SODIUM SERPL-SCNC: 141 MMOL/L — SIGNIFICANT CHANGE UP (ref 135–145)
SODIUM SERPL-SCNC: 142 MMOL/L — SIGNIFICANT CHANGE UP (ref 135–145)
WBC # BLD: 14.25 K/UL — HIGH (ref 3.8–10.5)
WBC # BLD: 8.86 K/UL — SIGNIFICANT CHANGE UP (ref 3.8–10.5)
WBC # BLD: 9 K/UL — SIGNIFICANT CHANGE UP (ref 3.8–10.5)
WBC # FLD AUTO: 14.25 K/UL — HIGH (ref 3.8–10.5)
WBC # FLD AUTO: 8.86 K/UL — SIGNIFICANT CHANGE UP (ref 3.8–10.5)
WBC # FLD AUTO: 9 K/UL — SIGNIFICANT CHANGE UP (ref 3.8–10.5)

## 2024-09-05 PROCEDURE — 99233 SBSQ HOSP IP/OBS HIGH 50: CPT

## 2024-09-05 RX ORDER — CEFAZOLIN SODIUM 2 G/100ML
2000 INJECTION, SOLUTION INTRAVENOUS EVERY 8 HOURS
Refills: 0 | Status: DISCONTINUED | OUTPATIENT
Start: 2024-09-05 | End: 2024-09-05

## 2024-09-05 RX ORDER — POTASSIUM CHLORIDE 10 MEQ
10 TABLET, EXT RELEASE, PARTICLES/CRYSTALS ORAL
Refills: 0 | Status: COMPLETED | OUTPATIENT
Start: 2024-09-05 | End: 2024-09-05

## 2024-09-05 RX ORDER — FENTANYL CITRATE 50 UG/ML
25 INJECTION INTRAMUSCULAR; INTRAVENOUS
Refills: 0 | Status: DISCONTINUED | OUTPATIENT
Start: 2024-09-05 | End: 2024-09-05

## 2024-09-05 RX ORDER — CEFAZOLIN SODIUM 2 G/100ML
2000 INJECTION, SOLUTION INTRAVENOUS EVERY 8 HOURS
Refills: 0 | Status: COMPLETED | OUTPATIENT
Start: 2024-09-05 | End: 2024-09-06

## 2024-09-05 RX ORDER — HYDROMORPHONE HYDROCHLORIDE 2 MG/1
0.5 TABLET ORAL
Refills: 0 | Status: DISCONTINUED | OUTPATIENT
Start: 2024-09-05 | End: 2024-09-06

## 2024-09-05 RX ORDER — ONDANSETRON 2 MG/ML
4 INJECTION, SOLUTION INTRAMUSCULAR; INTRAVENOUS ONCE
Refills: 0 | Status: DISCONTINUED | OUTPATIENT
Start: 2024-09-05 | End: 2024-09-05

## 2024-09-05 RX ORDER — FENTANYL CITRATE 50 UG/ML
50 INJECTION INTRAMUSCULAR; INTRAVENOUS
Refills: 0 | Status: DISCONTINUED | OUTPATIENT
Start: 2024-09-05 | End: 2024-09-05

## 2024-09-05 RX ADMIN — HYDROMORPHONE HYDROCHLORIDE 0.5 MILLIGRAM(S): 2 TABLET ORAL at 20:07

## 2024-09-05 RX ADMIN — ACETAMINOPHEN 975 MILLIGRAM(S): 325 TABLET ORAL at 14:20

## 2024-09-05 RX ADMIN — CHLORHEXIDINE GLUCONATE 1 APPLICATION(S): 40 SOLUTION TOPICAL at 06:48

## 2024-09-05 RX ADMIN — Medication 10 MILLIGRAM(S): at 22:20

## 2024-09-05 RX ADMIN — METOPROLOL TARTRATE 12.5 MILLIGRAM(S): 100 TABLET ORAL at 10:37

## 2024-09-05 RX ADMIN — FENTANYL CITRATE 50 MICROGRAM(S): 50 INJECTION INTRAMUSCULAR; INTRAVENOUS at 19:55

## 2024-09-05 RX ADMIN — ACETAMINOPHEN 975 MILLIGRAM(S): 325 TABLET ORAL at 05:39

## 2024-09-05 RX ADMIN — Medication 100 MILLIEQUIVALENT(S): at 12:30

## 2024-09-05 RX ADMIN — MIDODRINE HYDROCHLORIDE 10 MILLIGRAM(S): 5 TABLET ORAL at 14:24

## 2024-09-05 RX ADMIN — FENTANYL CITRATE 50 MICROGRAM(S): 50 INJECTION INTRAMUSCULAR; INTRAVENOUS at 19:41

## 2024-09-05 RX ADMIN — ACETAMINOPHEN 975 MILLIGRAM(S): 325 TABLET ORAL at 22:21

## 2024-09-05 RX ADMIN — SUCRALFATE 1 GRAM(S): 1 SUSPENSION ORAL at 22:18

## 2024-09-05 RX ADMIN — ACETAMINOPHEN 975 MILLIGRAM(S): 325 TABLET ORAL at 06:09

## 2024-09-05 RX ADMIN — FENTANYL CITRATE 50 MICROGRAM(S): 50 INJECTION INTRAMUSCULAR; INTRAVENOUS at 20:07

## 2024-09-05 RX ADMIN — Medication 100 MILLILITER(S): at 14:10

## 2024-09-05 RX ADMIN — MIDODRINE HYDROCHLORIDE 10 MILLIGRAM(S): 5 TABLET ORAL at 05:39

## 2024-09-05 RX ADMIN — METOPROLOL TARTRATE 12.5 MILLIGRAM(S): 100 TABLET ORAL at 22:18

## 2024-09-05 RX ADMIN — Medication 100 MILLIEQUIVALENT(S): at 11:34

## 2024-09-05 RX ADMIN — MIDODRINE HYDROCHLORIDE 10 MILLIGRAM(S): 5 TABLET ORAL at 22:20

## 2024-09-05 RX ADMIN — Medication 1000 MILLILITER(S): at 01:53

## 2024-09-05 NOTE — PROGRESS NOTE ADULT - SUBJECTIVE AND OBJECTIVE BOX
Patient seen and examined at bedside. Pain is controlled. Patient denies any numbness, tingling, weakness, or any other orthopaedic complaint.    LABS:                        11.0   14.25 )-----------( 122      ( 05 Sep 2024 19:42 )             31.7     09-05    141  |  111<H>  |  20  ----------------------------<  112<H>  3.5   |  21<L>  |  0.76    Ca    7.9<L>      05 Sep 2024 19:42  Phos  2.3     09-04  Mg     2.0     09-04    TPro  x   /  Alb  2.1<L>  /  TBili  x   /  DBili  x   /  AST  x   /  ALT  x   /  AlkPhos  x   09-05    PT/INR - ( 05 Sep 2024 19:42 )   PT: 12.5 sec;   INR: 1.11 ratio         PTT - ( 05 Sep 2024 19:42 )  PTT:28.1 sec  Urinalysis Basic - ( 05 Sep 2024 19:42 )    Color: x / Appearance: x / SG: x / pH: x  Gluc: 112 mg/dL / Ketone: x  / Bili: x / Urobili: x   Blood: x / Protein: x / Nitrite: x   Leuk Esterase: x / RBC: x / WBC x   Sq Epi: x / Non Sq Epi: x / Bacteria: x        VITAL SIGNS:  T(C): 36.9 (09-05-24 @ 20:30), Max: 38 (09-05-24 @ 14:24)  HR: 81 (09-05-24 @ 20:30) (64 - 106)  BP: 100/60 (09-05-24 @ 20:00) (78/63 - 130/87)  RR: 13 (09-05-24 @ 20:30) (5 - 24)  SpO2: 97% (09-05-24 @ 20:30) (93% - 100%)    Gen: Resting in bed, no acute distress  RLE  Dressing in place, clean/dry/intact.   Vianney-incisional tenderness to palpation; otherwise, NTTP throughout the rest of the extremity.   SILT L2-S1.  GSC/TA/EHL/FHL intact. Q/H unable to assess 2' pain.   DP pulse doplerable  No calf tenderness bilaterally.   Compartments soft and compressible.         Assessment and Plan:  68y Male POD0 R Hip IMN    Follow up postoperative labs  WB for transfers/PT/OT  s/p 2U PRBC, 1U FFP, 1U Plasma intraop  Pain management PRN  Continue prophylactic antibiotics  DVT PPx: hold until POD1, lovenox will defer to primary team  Incentive spirometry  Dispo: pending recommendations per PT  Will discuss with Dr. Chambers and advise of any changes to the plan.  Patient seen and examined at bedside. Pain is controlled. Patient denies any numbness, tingling, weakness, or any other orthopaedic complaint.    LABS:                        11.0   14.25 )-----------( 122      ( 05 Sep 2024 19:42 )             31.7     09-05    141  |  111<H>  |  20  ----------------------------<  112<H>  3.5   |  21<L>  |  0.76    Ca    7.9<L>      05 Sep 2024 19:42  Phos  2.3     09-04  Mg     2.0     09-04    TPro  x   /  Alb  2.1<L>  /  TBili  x   /  DBili  x   /  AST  x   /  ALT  x   /  AlkPhos  x   09-05    PT/INR - ( 05 Sep 2024 19:42 )   PT: 12.5 sec;   INR: 1.11 ratio         PTT - ( 05 Sep 2024 19:42 )  PTT:28.1 sec  Urinalysis Basic - ( 05 Sep 2024 19:42 )    Color: x / Appearance: x / SG: x / pH: x  Gluc: 112 mg/dL / Ketone: x  / Bili: x / Urobili: x   Blood: x / Protein: x / Nitrite: x   Leuk Esterase: x / RBC: x / WBC x   Sq Epi: x / Non Sq Epi: x / Bacteria: x        VITAL SIGNS:  T(C): 36.9 (09-05-24 @ 20:30), Max: 38 (09-05-24 @ 14:24)  HR: 81 (09-05-24 @ 20:30) (64 - 106)  BP: 100/60 (09-05-24 @ 20:00) (78/63 - 130/87)  RR: 13 (09-05-24 @ 20:30) (5 - 24)  SpO2: 97% (09-05-24 @ 20:30) (93% - 100%)    Gen: Resting in bed, no acute distress    Exam:  Gen: NAD  Right Lower Extremity:  Hip externally rotated with knee contracted at flexion of 90 degrees  Compression dressing of upper thigh clean/dry/intact  Soft compartments  Negative calf ttp  +EHL/FHL/TA/GSc  SILT  DP Dopperable      Assessment and Plan:  68y Male POD0 R Hip IMN    Follow up postoperative labs  WB for transfers/PT/OT  s/p 2U PRBC, 1U FFP, 1U Plasma intraop  Pain management PRN  Continue prophylactic antibiotics  DVT PPx: hold until POD1, lovenox will defer to primary team  Incentive spirometry  Dispo: pending recommendations per PT  Will discuss with Dr. Chambers and advise of any changes to the plan.

## 2024-09-05 NOTE — PROGRESS NOTE ADULT - SUBJECTIVE AND OBJECTIVE BOX
Pt seen and examined at bedside. Patient had low blood pressure yesterday with hemoglobin trending down, given 1U with appropriate response. Otherwise, no acute events overnight. Pain controlled, denies any numbness or tingling. Denies nausea, vomiting, chest pain, or shortness of breath.       LABS:                        8.7    8.86  )-----------( 128      ( 05 Sep 2024 01:55 )             25.6     09-04    138  |  110<H>  |  21  ----------------------------<  132<H>  4.2   |  23  |  0.92    Ca    8.2<L>      04 Sep 2024 04:30  Phos  2.3     09-04  Mg     2.0     09-04    TPro  6.1  /  Alb  2.8<L>  /  TBili  2.5<H>  /  DBili  x   /  AST  25  /  ALT  11<L>  /  AlkPhos  79  09-03    PT/INR - ( 04 Sep 2024 04:30 )   PT: 14.2 sec;   INR: 1.27 ratio         PTT - ( 04 Sep 2024 04:30 )  PTT:34.1 sec  Urinalysis Basic - ( 04 Sep 2024 04:30 )    Color: x / Appearance: x / SG: x / pH: x  Gluc: 132 mg/dL / Ketone: x  / Bili: x / Urobili: x   Blood: x / Protein: x / Nitrite: x   Leuk Esterase: x / RBC: x / WBC x   Sq Epi: x / Non Sq Epi: x / Bacteria: x        VITAL SIGNS:  T(C): 36.6 (09-04-24 @ 18:35), Max: 37.6 (09-04-24 @ 09:10)  HR: 77 (09-05-24 @ 04:03) (73 - 135)  BP: 93/68 (09-05-24 @ 04:03) (78/63 - 99/67)  RR: 20 (09-05-24 @ 04:03) (10 - 28)  SpO2: 94% (09-05-24 @ 04:03) (94% - 99%)      Exam:  Gen: NAD  Right Lower Extremity:  Hip externally rotated with knee contracted at flexion of 90 degrees  Compression dressing of upper thigh clean/dry/intact  Soft compartments  Negative calf ttp  +EHL/FHL/TA/GSc  SILT  DP Dopperable      A/P:  68yMale with R proximal femur fracture, procedure aborted 2/2 patient instability 9/3/24, pending possible OR today    Plan for OR today with Jenelle Chambers and Stephen  NWB RLE  PT/OT  Analgesics  Rest, ice, compress elevate extremity as needed  NPO except medications  Hold chemical DVT ppx, SCDs OK  Please re-document medical and cardiac clearance/optimization for OR    Will discuss plan with Dr. Chambers and adjust plan as necessary

## 2024-09-05 NOTE — BRIEF OPERATIVE NOTE - NSICDXBRIEFPOSTOP_GEN_ALL_CORE_FT
POST-OP DIAGNOSIS:  Hip fracture, right 03-Sep-2024 18:31:21  Mirela Taylor  
POST-OP DIAGNOSIS:  Hip fracture, right 03-Sep-2024 18:31:21  Mirela Taylor
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
## 2024-09-05 NOTE — PROGRESS NOTE ADULT - SUBJECTIVE AND OBJECTIVE BOX
Pt seen at bedside overnight. No acute complaints, pt denies any pain in R foot or calf at this time. Denies numbness, tingling, fevers, chills, CP, SOB, N/V/D.    ICU Vital Signs Last 24 Hrs  T(C): 36.6 (04 Sep 2024 18:35), Max: 37.6 (04 Sep 2024 09:10)  T(F): 97.9 (04 Sep 2024 18:35), Max: 99.7 (04 Sep 2024 09:10)  HR: 91 (05 Sep 2024 02:00) (73 - 135)  BP: 78/63 (05 Sep 2024 02:00) (78/63 - 99/67)  BP(mean): 70 (05 Sep 2024 02:00) (70 - 81)  ABP: 91/53 (05 Sep 2024 02:00) (91/53 - 134/77)  ABP(mean): 65 (05 Sep 2024 02:00) (62 - 96)  RR: 16 (05 Sep 2024 02:00) (10 - 28)  SpO2: 95% (05 Sep 2024 02:00) (95% - 99%)    O2 Parameters below as of 04 Sep 2024 18:35  Patient On (Oxygen Delivery Method): room air                                              8.7    8.86  )-----------( 128      ( 05 Sep 2024 01:55 )             25.6   09-04    138  |  110<H>  |  21  ----------------------------<  132<H>  4.2   |  23  |  0.92    Ca    8.2<L>      04 Sep 2024 04:30  Phos  2.3     09-04  Mg     2.0     09-04    TPro  6.1  /  Alb  2.8<L>  /  TBili  2.5<H>  /  DBili  x   /  AST  25  /  ALT  11<L>  /  AlkPhos  79  09-03    I&O's Summary    03 Sep 2024 07:01  -  04 Sep 2024 07:00  --------------------------------------------------------  IN: 0 mL / OUT: 1075 mL / NET: -1075 mL    04 Sep 2024 07:01  -  05 Sep 2024 02:45  --------------------------------------------------------  IN: 349 mL / OUT: 250 mL / NET: 99 mL          Physical Exam  General: WD, WN, less lethargic than previous                                                         Neuro: A+O x 2, speech with some dysarthria, soft spoken and slow but easily understandable  Eyes: PERRL   Chest: CTA, good air entry, equal bilaterally  GI: soft, NT, ND  Extremities: RLE contracted, hip externally rotated, and R hip dressing C/D/I. L AKA. right hip dressing with strikethrough. R foot cool to ankle. sensorimotor intact.  SKIN: warm, dry, intact   VASC: RLE 4/4 dopplerable femoral, 2/4 dopplerable popliteal, 2/4 dopplerable AT, nondopplerable PT/DP

## 2024-09-05 NOTE — PROGRESS NOTE ADULT - ASSESSMENT
68M with hx of PAD s/p R femur IMN complicated by intraoperative instability   vascular surgery called to evaluate pulse changes postoperatively  US duplex done 9/5 showing   Calcified plaque without duplex evidence of hemodynamically significant   stenosis or occlusion in the femoropopliteal circulation.  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  Two-vessel runoff in the right calf with occlusion of the posterior   tibial artery.    Recommendations  no active bleeding noted on imaging nor any concerns for acute limb ischemia  vascular exam is likely due to chronic PAD exacerbated by low flow state/hemodynamic instability  recommend conservative mgmt at this time  no vascular surgery intervention warranted    to be discussed with vascular surgery attending

## 2024-09-05 NOTE — PROGRESS NOTE ADULT - PROBLEM SELECTOR PLAN 2
Improved BP based on A-line readings. Normal LV systolic function on TTE. No ACS based on troponin levels.   No cardiac contraindications to proceed with non-elective orthopedic surgery.   Due to poor baseline functional capacity and comorbidities, this patient is at intermediate risk of perioperative complications.  If develops SVT intraoperatively, would give amiodarone 300mg IV over 10 min.

## 2024-09-05 NOTE — BRIEF OPERATIVE NOTE - NSICDXBRIEFPREOP_GEN_ALL_CORE_FT
PRE-OP DIAGNOSIS:  Hip fracture, right 03-Sep-2024 18:31:13  Mirela Taylor  
PRE-OP DIAGNOSIS:  Hip fracture, right 03-Sep-2024 18:31:13  Mirela Taylor
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
## 2024-09-05 NOTE — BRIEF OPERATIVE NOTE - NSICDXBRIEFPROCEDURE_GEN_ALL_CORE_FT
PROCEDURES:  Placement of trochanteric nail into right hip 03-Sep-2024 18:30:55 procedure aborted 2/2 patient instability Mirela Taylor  
PROCEDURES:  Placement of trochanteric nail into right hip 03-Sep-2024 18:30:55 procedure aborted 2/2 patient instability Mirela Taylor

## 2024-09-05 NOTE — BRIEF OPERATIVE NOTE - SECOND ASSISTANT
Patient was admitted to the hospital 12/10-12/13 for abdominal pain. He had been scheduled for surgery based on CT findings but patient ultimately declined stating he was feeling better. Contacted patient today per hospital discharge protocol. Patient states he is currently at [facility] because he doesn't feel well. No further follow up is needed by the [facility] team at this time.  
Resident/Fellow

## 2024-09-05 NOTE — PROGRESS NOTE ADULT - PROBLEM SELECTOR PLAN 1
No sustained SVT overnight. SR with frequent atrial ectopy, nonsustained AT. No AF  Replete potassium.
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
## 2024-09-06 LAB
ANION GAP SERPL CALC-SCNC: 8 MMOL/L — SIGNIFICANT CHANGE UP (ref 5–17)
APPEARANCE UR: CLEAR — SIGNIFICANT CHANGE UP
BACTERIA # UR AUTO: ABNORMAL /HPF
BILIRUB UR-MCNC: NEGATIVE — SIGNIFICANT CHANGE UP
BUN SERPL-MCNC: 19 MG/DL — SIGNIFICANT CHANGE UP (ref 7–23)
CALCIUM SERPL-MCNC: 7.7 MG/DL — LOW (ref 8.5–10.1)
CAST: 2 /LPF — SIGNIFICANT CHANGE UP (ref 0–4)
CHLORIDE SERPL-SCNC: 111 MMOL/L — HIGH (ref 96–108)
CO2 SERPL-SCNC: 22 MMOL/L — SIGNIFICANT CHANGE UP (ref 22–31)
COLOR SPEC: YELLOW — SIGNIFICANT CHANGE UP
CREAT SERPL-MCNC: 0.77 MG/DL — SIGNIFICANT CHANGE UP (ref 0.5–1.3)
DIFF PNL FLD: NEGATIVE — SIGNIFICANT CHANGE UP
EGFR: 96 ML/MIN/1.73M2 — SIGNIFICANT CHANGE UP
GLUCOSE SERPL-MCNC: 92 MG/DL — SIGNIFICANT CHANGE UP (ref 70–99)
GLUCOSE UR QL: NEGATIVE MG/DL — SIGNIFICANT CHANGE UP
HCT VFR BLD CALC: 28 % — LOW (ref 39–50)
HCT VFR BLD CALC: 30.8 % — LOW (ref 39–50)
HGB BLD-MCNC: 10.2 G/DL — LOW (ref 13–17)
HGB BLD-MCNC: 9.5 G/DL — LOW (ref 13–17)
KETONES UR-MCNC: 40 MG/DL
LACTATE SERPL-SCNC: 1.4 MMOL/L — SIGNIFICANT CHANGE UP (ref 0.7–2)
LEUKOCYTE ESTERASE UR-ACNC: ABNORMAL
MCHC RBC-ENTMCNC: 30.3 PG — SIGNIFICANT CHANGE UP (ref 27–34)
MCHC RBC-ENTMCNC: 30.8 PG — SIGNIFICANT CHANGE UP (ref 27–34)
MCHC RBC-ENTMCNC: 33.1 GM/DL — SIGNIFICANT CHANGE UP (ref 32–36)
MCHC RBC-ENTMCNC: 33.9 GM/DL — SIGNIFICANT CHANGE UP (ref 32–36)
MCV RBC AUTO: 89.2 FL — SIGNIFICANT CHANGE UP (ref 80–100)
MCV RBC AUTO: 93.1 FL — SIGNIFICANT CHANGE UP (ref 80–100)
NITRITE UR-MCNC: NEGATIVE — SIGNIFICANT CHANGE UP
PH UR: 5.5 — SIGNIFICANT CHANGE UP (ref 5–8)
PLATELET # BLD AUTO: 118 K/UL — LOW (ref 150–400)
PLATELET # BLD AUTO: 122 K/UL — LOW (ref 150–400)
POTASSIUM SERPL-MCNC: 3.5 MMOL/L — SIGNIFICANT CHANGE UP (ref 3.5–5.3)
POTASSIUM SERPL-SCNC: 3.5 MMOL/L — SIGNIFICANT CHANGE UP (ref 3.5–5.3)
PROT UR-MCNC: SIGNIFICANT CHANGE UP MG/DL
RBC # BLD: 3.14 M/UL — LOW (ref 4.2–5.8)
RBC # BLD: 3.31 M/UL — LOW (ref 4.2–5.8)
RBC # FLD: 14.7 % — HIGH (ref 10.3–14.5)
RBC # FLD: 15 % — HIGH (ref 10.3–14.5)
RBC CASTS # UR COMP ASSIST: 5 /HPF — HIGH (ref 0–4)
SODIUM SERPL-SCNC: 141 MMOL/L — SIGNIFICANT CHANGE UP (ref 135–145)
SP GR SPEC: 1.02 — SIGNIFICANT CHANGE UP (ref 1–1.03)
SQUAMOUS # UR AUTO: 2 /HPF — SIGNIFICANT CHANGE UP (ref 0–5)
UROBILINOGEN FLD QL: 1 MG/DL — SIGNIFICANT CHANGE UP (ref 0.2–1)
WBC # BLD: 8.99 K/UL — SIGNIFICANT CHANGE UP (ref 3.8–10.5)
WBC # BLD: 9.55 K/UL — SIGNIFICANT CHANGE UP (ref 3.8–10.5)
WBC # FLD AUTO: 8.99 K/UL — SIGNIFICANT CHANGE UP (ref 3.8–10.5)
WBC # FLD AUTO: 9.55 K/UL — SIGNIFICANT CHANGE UP (ref 3.8–10.5)
WBC UR QL: 13 /HPF — HIGH (ref 0–5)

## 2024-09-06 PROCEDURE — 93010 ELECTROCARDIOGRAM REPORT: CPT

## 2024-09-06 PROCEDURE — 99233 SBSQ HOSP IP/OBS HIGH 50: CPT

## 2024-09-06 PROCEDURE — 71045 X-RAY EXAM CHEST 1 VIEW: CPT | Mod: 26

## 2024-09-06 RX ORDER — ACETAMINOPHEN 325 MG/1
1000 TABLET ORAL ONCE
Refills: 0 | Status: COMPLETED | OUTPATIENT
Start: 2024-09-06 | End: 2024-09-06

## 2024-09-06 RX ORDER — METOPROLOL TARTRATE 100 MG/1
2.5 TABLET ORAL ONCE
Refills: 0 | Status: COMPLETED | OUTPATIENT
Start: 2024-09-06 | End: 2024-09-06

## 2024-09-06 RX ORDER — HYDROMORPHONE HYDROCHLORIDE 2 MG/1
0.5 TABLET ORAL
Refills: 0 | Status: DISCONTINUED | OUTPATIENT
Start: 2024-09-06 | End: 2024-09-09

## 2024-09-06 RX ORDER — GABAPENTIN 100 MG
100 CAPSULE ORAL EVERY 8 HOURS
Refills: 0 | Status: DISCONTINUED | OUTPATIENT
Start: 2024-09-06 | End: 2024-09-11

## 2024-09-06 RX ADMIN — Medication 100 MILLIGRAM(S): at 22:45

## 2024-09-06 RX ADMIN — MIDODRINE HYDROCHLORIDE 10 MILLIGRAM(S): 5 TABLET ORAL at 05:44

## 2024-09-06 RX ADMIN — HYDROMORPHONE HYDROCHLORIDE 0.5 MILLIGRAM(S): 2 TABLET ORAL at 01:58

## 2024-09-06 RX ADMIN — OXYCODONE HYDROCHLORIDE 10 MILLIGRAM(S): 5 TABLET ORAL at 09:03

## 2024-09-06 RX ADMIN — SUCRALFATE 1 GRAM(S): 1 SUSPENSION ORAL at 23:48

## 2024-09-06 RX ADMIN — METOPROLOL TARTRATE 12.5 MILLIGRAM(S): 100 TABLET ORAL at 22:46

## 2024-09-06 RX ADMIN — ACETAMINOPHEN 400 MILLIGRAM(S): 325 TABLET ORAL at 18:13

## 2024-09-06 RX ADMIN — ONDANSETRON 4 MILLIGRAM(S): 2 INJECTION, SOLUTION INTRAMUSCULAR; INTRAVENOUS at 14:34

## 2024-09-06 RX ADMIN — CEFAZOLIN SODIUM 2000 MILLIGRAM(S): 2 INJECTION, SOLUTION INTRAVENOUS at 14:35

## 2024-09-06 RX ADMIN — Medication 75 MILLIGRAM(S): at 15:45

## 2024-09-06 RX ADMIN — SUCRALFATE 1 GRAM(S): 1 SUSPENSION ORAL at 10:46

## 2024-09-06 RX ADMIN — Medication 10 MILLIGRAM(S): at 22:46

## 2024-09-06 RX ADMIN — ACETAMINOPHEN 975 MILLIGRAM(S): 325 TABLET ORAL at 05:45

## 2024-09-06 RX ADMIN — Medication 100 MILLIGRAM(S): at 15:45

## 2024-09-06 RX ADMIN — CEFAZOLIN SODIUM 2000 MILLIGRAM(S): 2 INJECTION, SOLUTION INTRAVENOUS at 05:53

## 2024-09-06 RX ADMIN — METOPROLOL TARTRATE 2.5 MILLIGRAM(S): 100 TABLET ORAL at 15:04

## 2024-09-06 RX ADMIN — OXYCODONE HYDROCHLORIDE 10 MILLIGRAM(S): 5 TABLET ORAL at 08:33

## 2024-09-06 RX ADMIN — ACETAMINOPHEN 1000 MILLIGRAM(S): 325 TABLET ORAL at 18:31

## 2024-09-06 RX ADMIN — ACETAMINOPHEN 975 MILLIGRAM(S): 325 TABLET ORAL at 22:49

## 2024-09-06 RX ADMIN — Medication 40 MILLIGRAM(S): at 10:46

## 2024-09-06 NOTE — PHYSICAL THERAPY INITIAL EVALUATION ADULT - PATIENT/FAMILY/SIGNIFICANT OTHER GOALS STATEMENT, PT EVAL
Return to [facility] Return to [facility], No anticipated DC PT needs

## 2024-09-06 NOTE — PROGRESS NOTE ADULT - SUBJECTIVE AND OBJECTIVE BOX
HOSPITALIST ATTENDING PROGRESS NOTE    Chart and meds reviewed.  Patient seen and examined.    CC:    Subjective:    Patient was examined at bedside. Down graded from ICU status. Resting in bed patients main complaint is 8/10 pain in right extremity. PAtient was also found to have a fever of 101 and in Atril fibrillation  on EKG. Other wise denies body aches, chills, SOB. Will further evaluate.     All other systems reviewed and found to be negative with the exception of what has been described above.    MEDICATIONS  (STANDING):  acetaminophen     Tablet .. 975 milliGRAM(s) Oral every 8 hours  acetaminophen   IVPB .. 1000 milliGRAM(s) IV Intermittent once  atorvastatin 10 milliGRAM(s) Oral at bedtime  chlorhexidine 4% Liquid 1 Application(s) Topical <User Schedule>  clopidogrel Tablet 75 milliGRAM(s) Oral daily  gabapentin 100 milliGRAM(s) Oral every 8 hours  lactated ringers. 1000 milliLiter(s) (75 mL/Hr) IV Continuous <Continuous>  metoprolol tartrate 12.5 milliGRAM(s) Oral every 12 hours  pantoprazole    Tablet 40 milliGRAM(s) Oral daily  sucralfate 1 Gram(s) Oral two times a day    MEDICATIONS  (PRN):  HYDROmorphone  Injectable 0.5 milliGRAM(s) IV Push every 3 hours PRN Moderate Pain (4 - 6)  magnesium hydroxide Suspension 30 milliLiter(s) Oral daily PRN Constipation  ondansetron Injectable 4 milliGRAM(s) IV Push every 6 hours PRN Nausea and/or Vomiting  oxyCODONE    IR 5 milliGRAM(s) Oral every 4 hours PRN Moderate Pain (4 - 6)  oxyCODONE    IR 10 milliGRAM(s) Oral every 4 hours PRN Severe Pain (7 - 10)  polyethylene glycol 3350 17 Gram(s) Oral at bedtime PRN Constipation  senna 2 Tablet(s) Oral at bedtime PRN Constipation  traMADol 50 milliGRAM(s) Oral every 4 hours PRN Mild Pain (1 - 3)      VITALS:  T(F): 101.8 (09-06-24 @ 17:25), Max: 101.8 (09-06-24 @ 17:25)  HR: 130 (09-06-24 @ 17:25) (78 - 130)  BP: 117/69 (09-06-24 @ 17:25) (92/44 - 130/87)  RR: 18 (09-06-24 @ 17:25) (11 - 25)  SpO2: 94% (09-06-24 @ 17:25) (90% - 99%)  Wt(kg): --    I&O's Summary    05 Sep 2024 07:01  -  06 Sep 2024 07:00  --------------------------------------------------------  IN: 1679 mL / OUT: 2125 mL / NET: -446 mL        CAPILLARY BLOOD GLUCOSE          PHYSICAL EXAM:  Gen: NAD  HEENT:  pupils equal and reactive, EOMI, no oropharyngeal lesions, erythema, exudates, oral thrush  NECK:   supple, no carotid bruits, no palpable lymph nodes, no thyromegaly  CV:  +S1, +S2, irregular, no murmurs or rubs  RESP:   lungs clear to auscultation bilaterally, no wheezing, rales, rhonchi, good air entry bilaterally  GI:  abdomen firm, non-tender, non-distended, normal BS, no bruits, no abdominal masses, no palpable masses  RECTAL:  not examined  : condom cath notes without purulent urine     MSK:   normal muscle tone, no atrophy, no rigidity, no contractions  EXT:  no clubbing, no cyanosis, no edema, no calf pain, swelling or erythema  VASCULAR:  diffcult to palpate pulses   NEURO:  AAOX3, no focal neurological deficits, follows all commands, able to move extremities spontaneously  SKIN:  no ulcers, lesions or rashes    LABS:                            10.2   9.55  )-----------( 118      ( 06 Sep 2024 12:38 )             30.8     09-06    141  |  111<H>  |  19  ----------------------------<  92  3.5   |  22  |  0.77    Ca    7.7<L>      06 Sep 2024 06:00    TPro  x   /  Alb  2.1<L>  /  TBili  x   /  DBili  x   /  AST  x   /  ALT  x   /  AlkPhos  x   09-05        LIVER FUNCTIONS - ( 05 Sep 2024 08:00 )  Alb: 2.1 g/dL / Pro: x     / ALK PHOS: x     / ALT: x     / AST: x     / GGT: x           PT/INR - ( 05 Sep 2024 19:42 )   PT: 12.5 sec;   INR: 1.11 ratio         PTT - ( 05 Sep 2024 19:42 )  PTT:28.1 sec  Urinalysis Basic - ( 06 Sep 2024 06:00 )    Color: x / Appearance: x / SG: x / pH: x  Gluc: 92 mg/dL / Ketone: x  / Bili: x / Urobili: x   Blood: x / Protein: x / Nitrite: x   Leuk Esterase: x / RBC: x / WBC x   Sq Epi: x / Non Sq Epi: x / Bacteria: x              CULTURES:      Additional results/Imaging, I have personally reviewed:    Telemetry, personally reviewed:

## 2024-09-06 NOTE — PHYSICAL THERAPY INITIAL EVALUATION ADULT - RANGE OF MOTION EXAMINATION, REHAB EVAL
Except RUE, limited ranges in LUE, BLE, RLE contracted, painful when stretched/deficits as listed below

## 2024-09-06 NOTE — PHYSICAL THERAPY INITIAL EVALUATION ADULT - PRECAUTIONS/LIMITATIONS, REHAB EVAL
cardiac precautions/fall precautions/surgical precautions RLE contracted, Left AKA/cardiac precautions/fall precautions/surgical precautions

## 2024-09-06 NOTE — PHYSICAL THERAPY INITIAL EVALUATION ADULT - GENERAL OBSERVATIONS, REHAB EVAL
Pt was found lying in bed in SICU with tele, BP cuff, ornelas, Left AKA, RLE contracted in Hip-knee flexion, LUE limited range/contracted, able to use RUE to feed himself, dax dependent and non ambulatory at baseline

## 2024-09-06 NOTE — PROGRESS NOTE ADULT - ASSESSMENT
67yo male with PMHx CVA residual left hemiplegia, contracted LE. s/p L AKA, CAD s/p CABG, PVD, lives in SNF, wheelchair bound.    [place] the mall with [place].  While on hoyar lift getting back into car his R leg was caught and caused excruciating pain causing him to become diaphoretic and vomiting  Found to have  femur fractrue      Assessment/Plan:    # R femur fracture s/p R Hip IMN complicated OR procedure due to hypotension and hemorrhage s/p 2 u PRBC     -Multi modal pain regiment  -Ortho pedic following  -Incentive spirometer   -PT/OT  -Will consider AC in am if stable overnight     #Paroxysmal Afib , episodes of SVT    -Metorpolol 25 mg BID      #SIRs criteria with fever and tachycardia, leukocytosis     -UA, blood cultures, CXR, LA  -Pt is high risk for thrombosis will check lower extremity dopplers, if fevers persist con consider CTA, however will not order not due to lack of hypoxia or SOB    -LR 75 ml/hr    #CAD s/p CABG  #PAD      Code status: Full  Diet: regular  DVT ppx possible lovvenox in am   Activity: Bedrest  Disposition: clinical course

## 2024-09-06 NOTE — CONSULT NOTE ADULT - PROBLEM SELECTOR PROBLEM 1
Mom dropped off school physical forms to fill out and be picked up in 2 business days.    Gastrointestinal hemorrhage, unspecified gastrointestinal hemorrhage type
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
## 2024-09-06 NOTE — PROGRESS NOTE ADULT - TIME BILLING
The necessity of time spent during the encounter on this date of service was due to:   - Personally obtaining the documented history, review of systems and physical exam where appropriate   - Ordering, reviewing, and interpreting labs, imaging, and other tests where appropriate   - Reviewing consultant documentation/recommendations in addition to discussing plan of care with consultants where appropriate   - Answering questions, providing  and informing patient and/or family regarding above items and plan of care   - Documentation as above.
.
Pateint seen labs reviewed discussed with albs for rounds
.

## 2024-09-06 NOTE — PROGRESS NOTE ADULT - ASSESSMENT
ASSESSMENT  69yo male with PMHx CVA residual left hemiplegia, contracted LE. s/p L AKA, CAD s/p CABG, PVD, lives in SNF, wheelchair bound.    [place] the mall with his [place].  While on hoyar lift getting back into car his R leg was caught and caused excruciating pain causing him to become diaphoretic and vomiting  Found to have rt femur fractrue    Taken to the OR on 9/3 for placement of R trochanteric nail into R hip however procedure aborted due to patient instability and bleeding  EBL 800cc, received 2u pRBC intra op    in PACU patient was reported to be lethargic,   code stroke called. CTH neg  Patient mentation improved, likely anesthesia and hypotension related    Had episode of SVT overnight 200s and hypotension. resolved with IV lopressor 5mg x 1 and IV fluids    Went back to OR 9/5 for R hip IMN   EBL 600cc, intra op received 2u pRBC, 1 FFP, 1u plt    PLAN    Neuro: AAOx 3. pain control prn IV tylenol, oxycodone  CV: BP better today. in sinus rhythm with PACs. cont lopressor 12.5mg bid. monitor off midodrine TTE EF 68%.  Pulm: on room air.   GI: PO diet.  PPI. bowel regimen prn  Nephro: cont NS @ 100 while NPO monitor I & Os. Trend renal fxn  Vasc: vasc consulted appreciated weak doppler pulses likely 2/2 hx of PAD.   ID: afebrile. leukocytosis likely related to surgery. cont post op abx ppx - ancef.   Heme: Hgb 11.0 post op, 9.5 this am. repeat CBC this afternoon. no chemical DVT ppx at this time due to bleeding risk     Dispo: Stable for med/surg. Trend H/H. titrate BP meds. PT,    Will discuss with Dr. France         ASSESSMENT  69yo male with PMHx CVA residual left hemiplegia, contracted LE. s/p L AKA, CAD s/p CABG, PVD, lives in SNF, wheelchair bound.    went [place] the mall with his [place].  While on hoyar lift getting back into car his R leg was caught and caused excruciating pain causing him to become diaphoretic and vomiting  Found to have rt femur fractrue    Taken to the OR on 9/3 for placement of R trochanteric nail into R hip however procedure aborted due to patient instability and bleeding  EBL 800cc, received 2u pRBC intra op    in PACU patient was reported to be lethargic,   code stroke called. CTH neg  Patient mentation improved, likely anesthesia and hypotension related    Had episode of SVT overnight 200s and hypotension. resolved with IV lopressor 5mg x 1 and IV fluids    Went back to OR 9/5 for R hip IMN   EBL 600cc, intra op received 2u pRBC, 1 FFP, 1u plt    PLAN    Neuro: AAOx 3. pain control prn IV tylenol, oxycodone  CV: BP better today. in sinus rhythm with PACs. cont lopressor 12.5mg bid. monitor off midodrine TTE EF 68%.  Pulm: on room air.   GI: PO diet.  PPI. bowel regimen prn  Nephro: cont NS @ 100 while NPO monitor I & Os. Trend renal fxn  Vasc: vasc consulted appreciated weak doppler pulses likely 2/2 hx of PAD.   ID: afebrile. leukocytosis likely related to surgery. cont post op abx ppx - ancef.   Heme: Hgb 11.0 post op, 9.5 this am. repeat CBC this afternoon. no chemical DVT ppx at this time due to bleeding risk     Dispo: Stable for med/surg. Trend H/H. titrate BP meds. PT,    Will discuss with Dr. France    signed out to Dr. Shepherd [id number]       ASSESSMENT  67yo male with PMHx CVA residual left hemiplegia, contracted LE. s/p L AKA, CAD s/p CABG, PVD, lives in SNF, wheelchair bound.    went [place] the mall with his [place].  While on hoyar lift getting back into car his R leg was caught and caused excruciating pain causing him to become diaphoretic and vomiting  Found to have rt femur fractrue    Taken to the OR on 9/3 for placement of R trochanteric nail into R hip however procedure aborted due to patient instability and bleeding  EBL 800cc, received 2u pRBC intra op    in PACU patient was reported to be lethargic,   code stroke called. CTH neg  Patient mentation improved, likely anesthesia and hypotension related    Had episode of SVT overnight 200s and hypotension. resolved with IV lopressor 5mg x 1 and IV fluids    Went back to OR 9/5 for R hip IMN   EBL 600cc, intra op received 2u pRBC, 1 FFP, 1u plt    PLAN    Neuro: AAOx 3. pain control prn IV tylenol, oxycodone  CV: BP better today. in sinus rhythm with PACs. cont lopressor 12.5mg bid. monitor off midodrine TTE EF 68%. resume home plavix when ok with ortho  Pulm: on room air.   GI: PO diet.  PPI. bowel regimen prn  Nephro: cont NS @ 75 until PO intake adequate. monitor I & Os. Trend renal fxn  Vasc: vasc consulted appreciated weak doppler pulses likely 2/2 hx of PAD.   ID: afebrile. leukocytosis likely related to surgery. cont post op abx ppx - ancef.   Heme: Hgb 11.0 post op, 9.5 this am. repeat CBC this afternoon. no chemical DVT ppx at this time due to bleeding risk     Dispo: Stable for med/surg. Trend H/H. titrate BP meds. PT,    Will discuss with Dr. France    signed out to Dr. Shepherd [id number]       ASSESSMENT  69yo male with PMHx CVA residual left hemiplegia, contracted LE. s/p L AKA, CAD s/p CABG, PVD, lives in SNF, wheelchair bound.    went [place] the mall with his [place].  While on hoyar lift getting back into car his R leg was caught and caused excruciating pain causing him to become diaphoretic and vomiting  Found to have rt femur fractrue    Taken to the OR on 9/3 for placement of R trochanteric nail into R hip however procedure aborted due to patient instability and bleeding  EBL 800cc, received 2u pRBC intra op    in PACU patient was reported to be lethargic,   code stroke called. CTH neg  Patient mentation improved, likely anesthesia and hypotension related    Had episode of SVT overnight 200s and hypotension. resolved with IV lopressor 5mg x 1 and IV fluids    Went back to OR 9/5 for R hip IMN   EBL 600cc, intra op received 2u pRBC, 1 FFP, 1u plt    PLAN    Neuro: AAOx 3. pain control prn IV tylenol, oxycodone  CV: BP better today. in sinus rhythm with PACs. cont lopressor 12.5mg bid. monitor off midodrine TTE EF 68%. ok to resume plavix as per ortho.  Pulm: on room air.   GI: PO diet.  PPI. bowel regimen prn  Nephro: cont NS @ 75 until PO intake adequate. monitor I & Os. Trend renal fxn  Vasc: vasc consulted appreciated weak doppler pulses likely 2/2 hx of PAD.   ID: afebrile. leukocytosis likely related to surgery. cont post op abx ppx - ancef.   Heme: Hgb 11.0 post op, 9.5 this am. repeat CBC this afternoon - stable 10.2. no chemical DVT ppx at this time due to bleeding risk     Dispo: Stable for med/surg. Trend H/H. titrate BP meds. PT,    Will discuss with Dr. France    signed out to Dr. Shepherd [id number]

## 2024-09-06 NOTE — PHYSICAL THERAPY INITIAL EVALUATION ADULT - PERTINENT HX OF CURRENT PROBLEM, REHAB EVAL
69yo male with PMHx CVA residual left hemiplegia, contracted LE. s/p L AKA, CAD s/p CABG, PVD, lives in SNF, wheelchair bound.  He went [place] the mall with his [place].  While on hoyar lift getting back into car his R leg was caught and caused excruciating pain causing him to become diaphoretic and vomiting  Found to have rt femur fracture, Taken to the OR on 9/3 for placement of R trochanteric nail into R hip however procedure aborted due to patient instability and bleeding, Patient mentation improved, likely anesthesia and hypotension related,
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
## 2024-09-06 NOTE — PROGRESS NOTE ADULT - SUBJECTIVE AND OBJECTIVE BOX
Pt seen and examined at bedside. No acute events overnight, blood pressure stable and managed by the SICU. Pain controlled, denies any numbness or tingling. Denies nausea, vomiting, chest pain, or shortness of breath.       LABS:                        11.0   14.25 )-----------( 122      ( 05 Sep 2024 19:42 )             31.7     09-05    141  |  111<H>  |  20  ----------------------------<  112<H>  3.5   |  21<L>  |  0.76    Ca    7.9<L>      05 Sep 2024 19:42    TPro  x   /  Alb  2.1<L>  /  TBili  x   /  DBili  x   /  AST  x   /  ALT  x   /  AlkPhos  x   09-05    PT/INR - ( 05 Sep 2024 19:42 )   PT: 12.5 sec;   INR: 1.11 ratio         PTT - ( 05 Sep 2024 19:42 )  PTT:28.1 sec  Urinalysis Basic - ( 05 Sep 2024 19:42 )    Color: x / Appearance: x / SG: x / pH: x  Gluc: 112 mg/dL / Ketone: x  / Bili: x / Urobili: x   Blood: x / Protein: x / Nitrite: x   Leuk Esterase: x / RBC: x / WBC x   Sq Epi: x / Non Sq Epi: x / Bacteria: x        VITAL SIGNS:  T(C): 36.9 (09-05-24 @ 20:30), Max: 38 (09-05-24 @ 14:24)  HR: 99 (09-06-24 @ 04:00) (64 - 102)  BP: 109/64 (09-06-24 @ 04:00) (80/58 - 130/87)  RR: 14 (09-06-24 @ 04:00) (5 - 25)  SpO2: 95% (09-06-24 @ 04:00) (93% - 100%)      Exam:  Gen: NAD  Right Lower Extremity:  Hip externally rotated and knee contracted to 90 degrees flexion  Dressing clean/dry/intact  Soft compartments  Negative calf ttp  +EHL/FHL/TA/GSc  SILT L2-S1  DP+ Doppler      A/P: 68M w/ subtrochanteric fx sp IMN POD 1, doing well, hemodynamically more stable    - s/p 2U PRBC, 1U FFP, 1U Plasma intraop  - Weight bearing for transfers  - PT/OT  - Follow up AM labs  - Pain control as needed  - DVT ppx per primary  - Encourage IS  - Dispo: pending recommendations per PT      Will discuss with Dr. Chambers and advise of any changes to the plan.

## 2024-09-06 NOTE — PROGRESS NOTE ADULT - PROBLEM SELECTOR PLAN 1
No sustained SVT overnight. SR with frequent atrial ectopy, nonsustained AT. No AF  Also brief episodes of NSVT.   Would increase metoprolol to 25mg Po BID if BP tolerates

## 2024-09-07 LAB
ANION GAP SERPL CALC-SCNC: 7 MMOL/L — SIGNIFICANT CHANGE UP (ref 5–17)
BUN SERPL-MCNC: 16 MG/DL — SIGNIFICANT CHANGE UP (ref 7–23)
CALCIUM SERPL-MCNC: 7.7 MG/DL — LOW (ref 8.5–10.1)
CHLORIDE SERPL-SCNC: 112 MMOL/L — HIGH (ref 96–108)
CO2 SERPL-SCNC: 23 MMOL/L — SIGNIFICANT CHANGE UP (ref 22–31)
CREAT SERPL-MCNC: 0.72 MG/DL — SIGNIFICANT CHANGE UP (ref 0.5–1.3)
EGFR: 98 ML/MIN/1.73M2 — SIGNIFICANT CHANGE UP
GLUCOSE SERPL-MCNC: 93 MG/DL — SIGNIFICANT CHANGE UP (ref 70–99)
HCT VFR BLD CALC: 28 % — LOW (ref 39–50)
HCT VFR BLD CALC: 28.2 % — LOW (ref 39–50)
HGB BLD-MCNC: 9.2 G/DL — LOW (ref 13–17)
HGB BLD-MCNC: 9.5 G/DL — LOW (ref 13–17)
MCHC RBC-ENTMCNC: 30.9 PG — SIGNIFICANT CHANGE UP (ref 27–34)
MCHC RBC-ENTMCNC: 31.3 PG — SIGNIFICANT CHANGE UP (ref 27–34)
MCHC RBC-ENTMCNC: 32.9 GM/DL — SIGNIFICANT CHANGE UP (ref 32–36)
MCHC RBC-ENTMCNC: 33.7 GM/DL — SIGNIFICANT CHANGE UP (ref 32–36)
MCV RBC AUTO: 92.8 FL — SIGNIFICANT CHANGE UP (ref 80–100)
MCV RBC AUTO: 94 FL — SIGNIFICANT CHANGE UP (ref 80–100)
PLATELET # BLD AUTO: 145 K/UL — LOW (ref 150–400)
PLATELET # BLD AUTO: 153 K/UL — SIGNIFICANT CHANGE UP (ref 150–400)
POTASSIUM SERPL-MCNC: 3.4 MMOL/L — LOW (ref 3.5–5.3)
POTASSIUM SERPL-SCNC: 3.4 MMOL/L — LOW (ref 3.5–5.3)
RBC # BLD: 2.98 M/UL — LOW (ref 4.2–5.8)
RBC # BLD: 3.04 M/UL — LOW (ref 4.2–5.8)
RBC # FLD: 15.5 % — HIGH (ref 10.3–14.5)
RBC # FLD: 15.5 % — HIGH (ref 10.3–14.5)
SODIUM SERPL-SCNC: 142 MMOL/L — SIGNIFICANT CHANGE UP (ref 135–145)
WBC # BLD: 8.7 K/UL — SIGNIFICANT CHANGE UP (ref 3.8–10.5)
WBC # BLD: 8.95 K/UL — SIGNIFICANT CHANGE UP (ref 3.8–10.5)
WBC # FLD AUTO: 8.7 K/UL — SIGNIFICANT CHANGE UP (ref 3.8–10.5)
WBC # FLD AUTO: 8.95 K/UL — SIGNIFICANT CHANGE UP (ref 3.8–10.5)

## 2024-09-07 PROCEDURE — 93971 EXTREMITY STUDY: CPT | Mod: 26,RT

## 2024-09-07 PROCEDURE — 71275 CT ANGIOGRAPHY CHEST: CPT | Mod: 26

## 2024-09-07 PROCEDURE — 99231 SBSQ HOSP IP/OBS SF/LOW 25: CPT

## 2024-09-07 PROCEDURE — 99232 SBSQ HOSP IP/OBS MODERATE 35: CPT

## 2024-09-07 RX ORDER — POTASSIUM CHLORIDE 10 MEQ
20 TABLET, EXT RELEASE, PARTICLES/CRYSTALS ORAL
Refills: 0 | Status: COMPLETED | OUTPATIENT
Start: 2024-09-07 | End: 2024-09-07

## 2024-09-07 RX ORDER — METOPROLOL TARTRATE 100 MG/1
25 TABLET ORAL
Refills: 0 | Status: DISCONTINUED | OUTPATIENT
Start: 2024-09-07 | End: 2024-09-11

## 2024-09-07 RX ORDER — METOPROLOL TARTRATE 100 MG/1
5 TABLET ORAL ONCE
Refills: 0 | Status: COMPLETED | OUTPATIENT
Start: 2024-09-07 | End: 2024-09-07

## 2024-09-07 RX ADMIN — Medication 1000 MILLIGRAM(S): at 17:38

## 2024-09-07 RX ADMIN — TRAMADOL HYDROCHLORIDE 50 MILLIGRAM(S): 200 TABLET, EXTENDED RELEASE ORAL at 09:22

## 2024-09-07 RX ADMIN — Medication 100 MILLIGRAM(S): at 13:19

## 2024-09-07 RX ADMIN — Medication 75 MILLIGRAM(S): at 09:21

## 2024-09-07 RX ADMIN — ACETAMINOPHEN 975 MILLIGRAM(S): 325 TABLET ORAL at 13:18

## 2024-09-07 RX ADMIN — Medication 100 MILLIGRAM(S): at 05:55

## 2024-09-07 RX ADMIN — CHLORHEXIDINE GLUCONATE 1 APPLICATION(S): 40 SOLUTION TOPICAL at 05:55

## 2024-09-07 RX ADMIN — TRAMADOL HYDROCHLORIDE 50 MILLIGRAM(S): 200 TABLET, EXTENDED RELEASE ORAL at 10:22

## 2024-09-07 RX ADMIN — METOPROLOL TARTRATE 5 MILLIGRAM(S): 100 TABLET ORAL at 01:21

## 2024-09-07 RX ADMIN — Medication 75 MILLILITER(S): at 17:38

## 2024-09-07 RX ADMIN — Medication 100 MILLIGRAM(S): at 21:20

## 2024-09-07 RX ADMIN — SUCRALFATE 1 GRAM(S): 1 SUSPENSION ORAL at 09:22

## 2024-09-07 RX ADMIN — Medication 20 MILLIEQUIVALENT(S): at 13:18

## 2024-09-07 RX ADMIN — Medication 10 MILLIGRAM(S): at 21:21

## 2024-09-07 RX ADMIN — METOPROLOL TARTRATE 25 MILLIGRAM(S): 100 TABLET ORAL at 17:38

## 2024-09-07 RX ADMIN — Medication 20 MILLIEQUIVALENT(S): at 09:22

## 2024-09-07 RX ADMIN — METOPROLOL TARTRATE 25 MILLIGRAM(S): 100 TABLET ORAL at 05:55

## 2024-09-07 RX ADMIN — ACETAMINOPHEN 975 MILLIGRAM(S): 325 TABLET ORAL at 21:20

## 2024-09-07 RX ADMIN — ACETAMINOPHEN 975 MILLIGRAM(S): 325 TABLET ORAL at 05:55

## 2024-09-07 RX ADMIN — Medication 40 MILLIGRAM(S): at 09:21

## 2024-09-07 RX ADMIN — SUCRALFATE 1 GRAM(S): 1 SUSPENSION ORAL at 21:20

## 2024-09-07 RX ADMIN — ACETAMINOPHEN 975 MILLIGRAM(S): 325 TABLET ORAL at 22:00

## 2024-09-07 RX ADMIN — ACETAMINOPHEN 975 MILLIGRAM(S): 325 TABLET ORAL at 14:18

## 2024-09-07 NOTE — PROGRESS NOTE ADULT - SUBJECTIVE AND OBJECTIVE BOX
HOSPITALIST ATTENDING PROGRESS NOTE    Chart and meds reviewed.      67yo male with PMHx CVA residual left hemiplegia, contracted LE. s/p L AKA, CAD s/p CABG, PVD, lives in SNF, wheelchair bound.    [place] the [place] with [place].  While on hoyar lift getting back into car his R leg was caught and caused excruciating pain causing him to become diaphoretic and vomiting  Found to have  femur fractrue    Downgaded from ICU 9/7      Subjective: Patient seen and examined.      Additional results/Imaging, I have personally reviewed:    LABS:                            9.5    8.95  )-----------( 145      ( 07 Sep 2024 06:37 )             28.2     09-07    142  |  112<H>  |  16  ----------------------------<  93  3.4<L>   |  23  |  0.72    Ca    7.7<L>      07 Sep 2024 06:37            PT/INR - ( 05 Sep 2024 19:42 )   PT: 12.5 sec;   INR: 1.11 ratio         PTT - ( 05 Sep 2024 19:42 )  PTT:28.1 sec  Urinalysis Basic - ( 07 Sep 2024 06:37 )    Color: x / Appearance: x / SG: x / pH: x  Gluc: 93 mg/dL / Ketone: x  / Bili: x / Urobili: x   Blood: x / Protein: x / Nitrite: x   Leuk Esterase: x / RBC: x / WBC x   Sq Epi: x / Non Sq Epi: x / Bacteria: x        Lactate, Blood: 1.4 mmol/L (09-06 @ 20:18)        All other systems reviewed and found to be negative with the exception of what has been described above.    MEDICATIONS  (STANDING):  acetaminophen     Tablet .. 975 milliGRAM(s) Oral every 8 hours  atorvastatin 10 milliGRAM(s) Oral at bedtime  chlorhexidine 4% Liquid 1 Application(s) Topical <User Schedule>  clopidogrel Tablet 75 milliGRAM(s) Oral daily  gabapentin 100 milliGRAM(s) Oral every 8 hours  lactated ringers. 1000 milliLiter(s) (75 mL/Hr) IV Continuous <Continuous>  metoprolol tartrate 25 milliGRAM(s) Oral two times a day  pantoprazole    Tablet 40 milliGRAM(s) Oral daily  potassium chloride    Tablet ER 20 milliEquivalent(s) Oral every 2 hours  sucralfate 1 Gram(s) Oral two times a day    MEDICATIONS  (PRN):  HYDROmorphone  Injectable 0.5 milliGRAM(s) IV Push every 3 hours PRN Moderate Pain (4 - 6)  magnesium hydroxide Suspension 30 milliLiter(s) Oral daily PRN Constipation  ondansetron Injectable 4 milliGRAM(s) IV Push every 6 hours PRN Nausea and/or Vomiting  oxyCODONE    IR 5 milliGRAM(s) Oral every 4 hours PRN Moderate Pain (4 - 6)  oxyCODONE    IR 10 milliGRAM(s) Oral every 4 hours PRN Severe Pain (7 - 10)  polyethylene glycol 3350 17 Gram(s) Oral at bedtime PRN Constipation  senna 2 Tablet(s) Oral at bedtime PRN Constipation  traMADol 50 milliGRAM(s) Oral every 4 hours PRN Mild Pain (1 - 3)      VITALS:  T(F): 99.1 (09-07-24 @ 07:34), Max: 101.8 (09-06-24 @ 17:25)  HR: 115 (09-07-24 @ 07:34) (87 - 145)  BP: 112/57 (09-07-24 @ 07:34) (101/72 - 124/62)  RR: 18 (09-07-24 @ 07:34) (11 - 18)  SpO2: 96% (09-07-24 @ 07:34) (90% - 96%)  Wt(kg): --    I&O's Summary      CAPILLARY BLOOD GLUCOSE          PHYSICAL EXAM:  Gen: NAD  HEENT:  pupils equal and reactive, EOMI, no oropharyngeal lesions, erythema, exudates, oral thrush  NECK:   supple, no carotid bruits, no palpable lymph nodes, no thyromegaly  CV:  +S1, +S2, irregular, no murmurs or rubs  RESP:   lungs clear to auscultation bilaterally, no wheezing, rales, rhonchi, good air entry bilaterally  GI:  abdomen firm, non-tender, non-distended, normal BS, no bruits, no abdominal masses, no palpable masses  : condom cath   MSK:   normal muscle tone, no atrophy, no rigidity, no contractions  Hip externally rotated and knee contracted to 90 degrees flexion Dressing C/D/I  EXT:   VASCULAR:  difficult to palpate pulses   NEURO:  AAOX3, no focal neurological deficits, follows all commands, able to move extremities spontaneously  SKIN:  no ulcers, lesions or rashes        CULTURES:  Blood, Urine: Negative (09-06 @ 17:50)      Telemetry, personally reviewed HOSPITALIST ATTENDING PROGRESS NOTE    Chart and meds reviewed.      67yo male with PMHx CVA residual left hemiplegia, contracted LE. s/p L AKA, CAD s/p CABG, PVD, lives in SNF, wheelchair bound.    [place] the mall with [place].  While on hoyar lift getting back into car his R leg was caught and caused excruciating pain causing him to become diaphoretic and vomiting  Found to have  femur fractrue    Downgaded from ICU 9/7. Overnight patient with       Subjective: Patient seen and examined.  services used. episode of SVT on tele and Fever 101.9, was given lopressor and metoprolol PO dose was increased, Patient denies any chest pain or palpitations this AM. UA suspicious for UTI. Will empirically start Rocephin x 3 days.  reports surgical pain is controlled. hgb 9.5 from 10.2 repeat in a few hours. No signs of active bleed.       Additional results/Imaging, I have personally reviewed:    LABS:                            9.5    8.95  )-----------( 145      ( 07 Sep 2024 06:37 )             28.2     09-07    142  |  112<H>  |  16  ----------------------------<  93  3.4<L>   |  23  |  0.72    Ca    7.7<L>      07 Sep 2024 06:37            PT/INR - ( 05 Sep 2024 19:42 )   PT: 12.5 sec;   INR: 1.11 ratio         PTT - ( 05 Sep 2024 19:42 )  PTT:28.1 sec  Urinalysis Basic - ( 07 Sep 2024 06:37 )    Color: x / Appearance: x / SG: x / pH: x  Gluc: 93 mg/dL / Ketone: x  / Bili: x / Urobili: x   Blood: x / Protein: x / Nitrite: x   Leuk Esterase: x / RBC: x / WBC x   Sq Epi: x / Non Sq Epi: x / Bacteria: x        Lactate, Blood: 1.4 mmol/L (09-06 @ 20:18)        All other systems reviewed and found to be negative with the exception of what has been described above.    MEDICATIONS  (STANDING):  acetaminophen     Tablet .. 975 milliGRAM(s) Oral every 8 hours  atorvastatin 10 milliGRAM(s) Oral at bedtime  chlorhexidine 4% Liquid 1 Application(s) Topical <User Schedule>  clopidogrel Tablet 75 milliGRAM(s) Oral daily  gabapentin 100 milliGRAM(s) Oral every 8 hours  lactated ringers. 1000 milliLiter(s) (75 mL/Hr) IV Continuous <Continuous>  metoprolol tartrate 25 milliGRAM(s) Oral two times a day  pantoprazole    Tablet 40 milliGRAM(s) Oral daily  potassium chloride    Tablet ER 20 milliEquivalent(s) Oral every 2 hours  sucralfate 1 Gram(s) Oral two times a day    MEDICATIONS  (PRN):  HYDROmorphone  Injectable 0.5 milliGRAM(s) IV Push every 3 hours PRN Moderate Pain (4 - 6)  magnesium hydroxide Suspension 30 milliLiter(s) Oral daily PRN Constipation  ondansetron Injectable 4 milliGRAM(s) IV Push every 6 hours PRN Nausea and/or Vomiting  oxyCODONE    IR 5 milliGRAM(s) Oral every 4 hours PRN Moderate Pain (4 - 6)  oxyCODONE    IR 10 milliGRAM(s) Oral every 4 hours PRN Severe Pain (7 - 10)  polyethylene glycol 3350 17 Gram(s) Oral at bedtime PRN Constipation  senna 2 Tablet(s) Oral at bedtime PRN Constipation  traMADol 50 milliGRAM(s) Oral every 4 hours PRN Mild Pain (1 - 3)      VITALS:  T(F): 99.1 (09-07-24 @ 07:34), Max: 101.8 (09-06-24 @ 17:25)  HR: 115 (09-07-24 @ 07:34) (87 - 145)  BP: 112/57 (09-07-24 @ 07:34) (101/72 - 124/62)  RR: 18 (09-07-24 @ 07:34) (11 - 18)  SpO2: 96% (09-07-24 @ 07:34) (90% - 96%)  Wt(kg): --    I&O's Summary      CAPILLARY BLOOD GLUCOSE          PHYSICAL EXAM:  Gen: NAD  HEENT:  pupils equal and reactive, EOMI, no oropharyngeal lesions, erythema, exudates, oral thrush  NECK:   supple, no carotid bruits, no palpable lymph nodes, no thyromegaly  CV:  +S1, +S2, irregular, no murmurs or rubs  RESP:   lungs clear to auscultation bilaterally, no wheezing, rales, rhonchi, good air entry bilaterally  GI:  abdomen firm, non-tender, non-distended, normal BS, no bruits, no abdominal masses, no palpable masses  : condom cath   MSK:   normal muscle tone, no atrophy, no rigidity, no contractions  Hip externally rotated and knee contracted to 90 degrees flexion Dressing C/D/I  EXT:   VASCULAR:  difficult to palpate pulses   NEURO:  AAOX3, no focal neurological deficits, follows all commands, able to move extremities spontaneously  SKIN:  no ulcers, lesions or rashes        CULTURES:  Blood, Urine: Negative (09-06 @ 17:50)      Telemetry, personally reviewed

## 2024-09-07 NOTE — PROGRESS NOTE ADULT - ASSESSMENT
# R femur fracture s/p R Hip IMN complicated OR procedure due to hypotension and hemorrhage s/p 2 u PRBC   -Multi modal pain regiment  -Ortho pedic following  -Incentive spirometer   -PT/OT  -Repeat CBC in afternoon, if stable will restart AC     #Paroxysmal Afib , episodes of SVT  -Metorpolol 25 mg BID  Cardiology consulted, recommendations appreciated   Continue to replete K    Hypokalemia  Maintain k>4       #SIRS criteria with fever and tachycardia, leukocytosis   -UA indeterminate  Follow up blood and urine cultures  Will empirically start Ceftriaxone x 3 days   US RLE Venous  -1.  No evidence of right lower extremity deep venous thrombosis.  2.  Nonvisualized/nonevaluable infrapopliteal vasculature.  -Continue LR 75 ml/hr    Stable enlarged aortic shadow  Will obtain CT Chest     #CAD s/p CABG  #PAD      Code status: Full  Diet: regular  DVT ppx possible lovvenox in am   Activity: Bedrest  Disposition: clinical course     # R femur fracture s/p R Hip IMN complicated OR procedure due to hypotension and hemorrhage s/p 2 u PRBC   -Multi modal pain regiment  -Ortho pedic following  -Incentive spirometer   -PT/OT  -Repeat CBC in afternoon, if stable will restart AC     #Paroxysmal Afib , episodes of SVT  Metoprolol increased to 25 mg BID  Cardiology consulted, recommendations appreciated   Continue to replete K    Hypokalemia  Maintain k>4       #SIRS criteria with fever and tachycardia, leukocytosis   -UA indeterminate  Follow up blood and urine cultures  Will empirically start Ceftriaxone x 3 days   US RLE Venous  -1.  No evidence of right lower extremity deep venous thrombosis.  2.  Nonvisualized/nonevaluable infrapopliteal vasculature.  -Continue LR 75 ml/hr    Stable enlarged aortic shadow  Will obtain CTA Chest     #CAD s/p CABG  #PAD      Code status: Full  Diet: regular  DVT ppx possible lovenox if h/h stable   Activity: Bedrest  Disposition: clinical course     # R femur fracture s/p R Hip IMN complicated OR procedure due to hypotension and hemorrhage s/p 2 u PRBC   -Multi modal pain regiment  -Ortho pedic following  -Incentive spirometer   -PT/OT  -Repeat CBC in afternoon, if stable will restart AC     #Paroxysmal Afib , episodes of SVT  Metoprolol increased to 25 mg BID  Cardiology consulted, recommendations appreciated   Continue to replete K    Hypokalemia  Maintain k>4       #SIRS criteria with fever and tachycardia, leukocytosis   -UA indeterminate  Follow up blood and urine cultures  Will empirically start Ceftriaxone x 3 days   US RLE Venous  -1.  No evidence of right lower extremity deep venous thrombosis.  2.  Nonvisualized/nonevaluable infrapopliteal vasculature.  -Continue LR 75 ml/hr    Stable enlarged aortic shadow on XR  Will obtain CTA Chest     #CAD s/p CABG  #PAD      Code status: Full  Diet: regular  DVT ppx possible lovenox if h/h stable   Activity: Bedrest  Disposition: clinical course     # R femur fracture s/p R Hip IMN complicated OR procedure due to hypotension and hemorrhage s/p 2 u PRBC   -Multi modal pain regiment  -Ortho pedic following  -Incentive spirometer   -PT/OT  -Restart AC in AM if H/h Stable     #Paroxysmal Afib , episodes of SVT  Metoprolol increased to 25 mg BID  Cardiology consulted, recommendations appreciated   Continue to replete K    Hypokalemia  Maintain k>4       #SIRS criteria with fever and tachycardia, leukocytosis   -UA indeterminate  Follow up blood and urine cultures  Will empirically start Ceftriaxone x 3 days   US RLE Venous  -1.  No evidence of right lower extremity deep venous thrombosis.  2.  Nonvisualized/nonevaluable infrapopliteal vasculature.      Stable enlarged aortic shadow on XR  Will obtain CTA Chest     #CAD s/p CABG  #PAD      Code status: Full  Diet: regular  DVT ppx possible lovenox if h/h stable   Activity: Bedrest  Disposition: clinical course

## 2024-09-07 NOTE — PROGRESS NOTE ADULT - SUBJECTIVE AND OBJECTIVE BOX
REASON FOR VISIT:  Abn ECG    HPI:  68 year old, man with a history of CVA (residual left hemiplegia), s/p L AKA, CAD s/p CABG, PVD, admitted on 9/2/24 with a R femur fracture; initial operative repair complicated by instability / bleeding and with subsequent episode of SVT.    9/7/24: No cardiac complaints; not experiencing palpitations.      MEDICATIONS  (STANDING):  acetaminophen     Tablet .. 975 milliGRAM(s) Oral every 8 hours  atorvastatin 10 milliGRAM(s) Oral at bedtime  chlorhexidine 4% Liquid 1 Application(s) Topical <User Schedule>  clopidogrel Tablet 75 milliGRAM(s) Oral daily  gabapentin 100 milliGRAM(s) Oral every 8 hours  lactated ringers. 1000 milliLiter(s) (75 mL/Hr) IV Continuous <Continuous>  metoprolol tartrate 25 milliGRAM(s) Oral two times a day  pantoprazole    Tablet 40 milliGRAM(s) Oral daily  sucralfate 1 Gram(s) Oral two times a day    MEDICATIONS  (PRN):  HYDROmorphone  Injectable 0.5 milliGRAM(s) IV Push every 3 hours PRN Moderate Pain (4 - 6)  magnesium hydroxide Suspension 30 milliLiter(s) Oral daily PRN Constipation  ondansetron Injectable 4 milliGRAM(s) IV Push every 6 hours PRN Nausea and/or Vomiting  oxyCODONE    IR 10 milliGRAM(s) Oral every 4 hours PRN Severe Pain (7 - 10)  oxyCODONE    IR 5 milliGRAM(s) Oral every 4 hours PRN Moderate Pain (4 - 6)  polyethylene glycol 3350 17 Gram(s) Oral at bedtime PRN Constipation  senna 2 Tablet(s) Oral at bedtime PRN Constipation  traMADol 50 milliGRAM(s) Oral every 4 hours PRN Mild Pain (1 - 3)    Vital Signs Last 24 Hrs  T(C): 37.3 (07 Sep 2024 07:34), Max: 38.8 (06 Sep 2024 17:25)  T(F): 99.1 (07 Sep 2024 07:34), Max: 101.8 (06 Sep 2024 17:25)  HR: 115 (07 Sep 2024 07:34) (80 - 145)  BP: 112/57 (07 Sep 2024 07:34) (98/66 - 124/62)  BP(mean): 82 (06 Sep 2024 16:00) (75 - 95)  RR: 18 (07 Sep 2024 07:34) (11 - 18)  SpO2: 96% (07 Sep 2024 07:34) (90% - 96%)    Parameters below as of 06 Sep 2024 17:25  Patient On (Oxygen Delivery Method): room air    General: Supine in bed, NAD  Cardiac: Irregular, normal rate  Pulmonary: Nonlabored, clear to ausculation  Skin: Warm, dry    Labs:                   9.5    8.95  )-----------( 145      ( 07 Sep 2024 06:37 )             28.2     142  |  112<H>  |  16  ----------------------------<  93  3.4<L>   |  23  |  0.72    Ca    7.7<L>      07 Sep 2024 06:37    TroponinI hsT: 21.38    Tele: SR with frequent PACs, brief PAT/SVT    TTE W or WO Ultrasound Enhancing Agent (09.04.24 @ 12:11) >   1. Left ventricular systolic function is normal with an ejection fraction of 68 % by Marsh's method of disks.   2. Aortic root at the sinuses of Valsalva is dilated, measuring 4.00 cm (indexed 1.59 cm/m²). Ascending aorta diameter is dilated, measuring 3.90 cm (indexed 1.55 cm/m²). Aortic arch diameter is dilated, measuring 4.0 cm (indexed 1.59 cm/m²).

## 2024-09-07 NOTE — PROGRESS NOTE ADULT - PROBLEM SELECTOR PLAN 2
S/p remote CABG; stable (no angina, normal troponin assay upon admission); continue atorvastatin, metoprolol, Plavix.

## 2024-09-07 NOTE — PROGRESS NOTE ADULT - SUBJECTIVE AND OBJECTIVE BOX
Pt seen and examined at bedside. Episode of SVT with a fever, given metoprolol and tylenol. Pain controlled, denies any numbness or tingling. Denies nausea, vomiting, chest pain, or shortness of breath.     Vital Signs (24 Hrs):  T(C): 38.1 (09-07-24 @ 05:50), Max: 38.8 (09-06-24 @ 17:25)  HR: 109 (09-07-24 @ 05:50) (80 - 145)  BP: 115/67 (09-07-24 @ 05:50) (98/66 - 124/62)  RR: 18 (09-06-24 @ 17:25) (11 - 18)  SpO2: 94% (09-06-24 @ 17:25) (90% - 95%)  Wt(kg): --    LABS:                          10.2   9.55  )-----------( 118      ( 06 Sep 2024 12:38 )             30.8     09-06    141  |  111<H>  |  19  ----------------------------<  92  3.5   |  22  |  0.77    Ca    7.7<L>      06 Sep 2024 06:00    TPro  x   /  Alb  2.1<L>  /  TBili  x   /  DBili  x   /  AST  x   /  ALT  x   /  AlkPhos  x   09-05    LIVER FUNCTIONS - ( 05 Sep 2024 08:00 )  Alb: 2.1 g/dL / Pro: x     / ALK PHOS: x     / ALT: x     / AST: x     / GGT: x           PT/INR - ( 05 Sep 2024 19:42 )   PT: 12.5 sec;   INR: 1.11 ratio         PTT - ( 05 Sep 2024 19:42 )  PTT:28.1 sec    Exam:  Gen: NAD  Right Lower Extremity:  Hip externally rotated and knee contracted to 90 degrees flexion  Dressing clean/dry/intact, ace in place  Soft compartments  Negative calf ttp  +EHL/FHL/TA/GSc  SILT L2-S1  DP+ Doppler      A/P: 68M w/ subtrochanteric fx sp IMN POD 2, doing well, hemodynamically more stable    - s/p 2U PRBC, 1U FFP, 1U Plasma intraop  - Weight bearing for transfers  - PT/OT  - Follow up AM labs  - Pain control as needed  - DVT ppx per primary  - Encourage IS  - Dispo: SNF per PT  Will discuss with Dr. Chambers and advise of any changes to the plan.  Pt seen and examined at bedside. Episode of SVT with a fever, given metoprolol and tylenol. Pain controlled, denies any numbness or tingling. Denies nausea, vomiting, chest pain, or shortness of breath.     Vital Signs (24 Hrs):  T(C): 38.1 (09-07-24 @ 05:50), Max: 38.8 (09-06-24 @ 17:25)  HR: 109 (09-07-24 @ 05:50) (80 - 145)  BP: 115/67 (09-07-24 @ 05:50) (98/66 - 124/62)  RR: 18 (09-06-24 @ 17:25) (11 - 18)  SpO2: 94% (09-06-24 @ 17:25) (90% - 95%)  Wt(kg): --    LABS:                          10.2   9.55  )-----------( 118      ( 06 Sep 2024 12:38 )             30.8     09-06    141  |  111<H>  |  19  ----------------------------<  92  3.5   |  22  |  0.77    Ca    7.7<L>      06 Sep 2024 06:00    TPro  x   /  Alb  2.1<L>  /  TBili  x   /  DBili  x   /  AST  x   /  ALT  x   /  AlkPhos  x   09-05    LIVER FUNCTIONS - ( 05 Sep 2024 08:00 )  Alb: 2.1 g/dL / Pro: x     / ALK PHOS: x     / ALT: x     / AST: x     / GGT: x           PT/INR - ( 05 Sep 2024 19:42 )   PT: 12.5 sec;   INR: 1.11 ratio         PTT - ( 05 Sep 2024 19:42 )  PTT:28.1 sec    Exam:  Gen: NAD  Right Lower Extremity:  Diffuse RLE swelling  Hip externally rotated and knee contracted to 90 degrees flexion  Dressing clean/dry/intact, ace in place  Soft compartments  Negative calf ttp  +EHL/FHL/TA/GSc  SILT L2-S1  DP+ Doppler      A/P: 68M w/ subtrochanteric fx sp IMN POD 2, doing well, hemodynamically more stable    - s/p 2U PRBC, 1U FFP, 1U Plasma intraop  - Weight bearing for transfers  - PT/OT  - Follow up AM labs  - Pain control as needed  - DVT ppx per primary  - Encourage IS  - Dispo: SNF per PT  Will discuss with Dr. Chambers and advise of any changes to the plan.

## 2024-09-08 LAB
ANION GAP SERPL CALC-SCNC: 6 MMOL/L — SIGNIFICANT CHANGE UP (ref 5–17)
BUN SERPL-MCNC: 13 MG/DL — SIGNIFICANT CHANGE UP (ref 7–23)
CALCIUM SERPL-MCNC: 7.5 MG/DL — LOW (ref 8.5–10.1)
CHLORIDE SERPL-SCNC: 114 MMOL/L — HIGH (ref 96–108)
CO2 SERPL-SCNC: 23 MMOL/L — SIGNIFICANT CHANGE UP (ref 22–31)
CREAT SERPL-MCNC: 0.63 MG/DL — SIGNIFICANT CHANGE UP (ref 0.5–1.3)
CULTURE RESULTS: NO GROWTH — SIGNIFICANT CHANGE UP
EGFR: 102 ML/MIN/1.73M2 — SIGNIFICANT CHANGE UP
GLUCOSE SERPL-MCNC: 91 MG/DL — SIGNIFICANT CHANGE UP (ref 70–99)
HCT VFR BLD CALC: 25.7 % — LOW (ref 39–50)
HCT VFR BLD CALC: 25.8 % — LOW (ref 39–50)
HGB BLD-MCNC: 8.3 G/DL — LOW (ref 13–17)
HGB BLD-MCNC: 8.5 G/DL — LOW (ref 13–17)
MAGNESIUM SERPL-MCNC: 1.9 MG/DL — SIGNIFICANT CHANGE UP (ref 1.6–2.6)
MCHC RBC-ENTMCNC: 30.4 PG — SIGNIFICANT CHANGE UP (ref 27–34)
MCHC RBC-ENTMCNC: 32.3 GM/DL — SIGNIFICANT CHANGE UP (ref 32–36)
MCV RBC AUTO: 94.1 FL — SIGNIFICANT CHANGE UP (ref 80–100)
PLATELET # BLD AUTO: 150 K/UL — SIGNIFICANT CHANGE UP (ref 150–400)
POTASSIUM SERPL-MCNC: 3.6 MMOL/L — SIGNIFICANT CHANGE UP (ref 3.5–5.3)
POTASSIUM SERPL-SCNC: 3.6 MMOL/L — SIGNIFICANT CHANGE UP (ref 3.5–5.3)
RBC # BLD: 2.73 M/UL — LOW (ref 4.2–5.8)
RBC # FLD: 15.8 % — HIGH (ref 10.3–14.5)
SODIUM SERPL-SCNC: 143 MMOL/L — SIGNIFICANT CHANGE UP (ref 135–145)
SPECIMEN SOURCE: SIGNIFICANT CHANGE UP
WBC # BLD: 8.72 K/UL — SIGNIFICANT CHANGE UP (ref 3.8–10.5)
WBC # FLD AUTO: 8.72 K/UL — SIGNIFICANT CHANGE UP (ref 3.8–10.5)

## 2024-09-08 PROCEDURE — 99233 SBSQ HOSP IP/OBS HIGH 50: CPT

## 2024-09-08 RX ADMIN — Medication 1000 MILLIGRAM(S): at 17:14

## 2024-09-08 RX ADMIN — METOPROLOL TARTRATE 25 MILLIGRAM(S): 100 TABLET ORAL at 17:14

## 2024-09-08 RX ADMIN — ACETAMINOPHEN 975 MILLIGRAM(S): 325 TABLET ORAL at 13:34

## 2024-09-08 RX ADMIN — Medication 10 MILLIGRAM(S): at 21:30

## 2024-09-08 RX ADMIN — ACETAMINOPHEN 975 MILLIGRAM(S): 325 TABLET ORAL at 06:57

## 2024-09-08 RX ADMIN — Medication 40 MILLIGRAM(S): at 09:28

## 2024-09-08 RX ADMIN — METOPROLOL TARTRATE 25 MILLIGRAM(S): 100 TABLET ORAL at 05:19

## 2024-09-08 RX ADMIN — ACETAMINOPHEN 975 MILLIGRAM(S): 325 TABLET ORAL at 14:38

## 2024-09-08 RX ADMIN — OXYCODONE HYDROCHLORIDE 10 MILLIGRAM(S): 5 TABLET ORAL at 16:44

## 2024-09-08 RX ADMIN — ACETAMINOPHEN 975 MILLIGRAM(S): 325 TABLET ORAL at 22:00

## 2024-09-08 RX ADMIN — SUCRALFATE 1 GRAM(S): 1 SUSPENSION ORAL at 21:30

## 2024-09-08 RX ADMIN — Medication 100 MILLIGRAM(S): at 21:30

## 2024-09-08 RX ADMIN — ACETAMINOPHEN 975 MILLIGRAM(S): 325 TABLET ORAL at 05:19

## 2024-09-08 RX ADMIN — Medication 75 MILLILITER(S): at 05:18

## 2024-09-08 RX ADMIN — Medication 100 MILLIGRAM(S): at 05:19

## 2024-09-08 RX ADMIN — Medication 75 MILLIGRAM(S): at 09:27

## 2024-09-08 RX ADMIN — OXYCODONE HYDROCHLORIDE 10 MILLIGRAM(S): 5 TABLET ORAL at 15:44

## 2024-09-08 RX ADMIN — SUCRALFATE 1 GRAM(S): 1 SUSPENSION ORAL at 09:28

## 2024-09-08 RX ADMIN — ACETAMINOPHEN 975 MILLIGRAM(S): 325 TABLET ORAL at 21:29

## 2024-09-08 RX ADMIN — Medication 100 MILLIGRAM(S): at 13:34

## 2024-09-08 NOTE — PROGRESS NOTE ADULT - SUBJECTIVE AND OBJECTIVE BOX
REASON FOR VISIT:  Abn ECG    HPI:  68 year old, man with a history of CVA (residual left hemiplegia), s/p L AKA, CAD s/p CABG, PVD, admitted on 9/2/24 with a R femur fracture; initial operative repair complicated by instability / bleeding and with subsequent episode of SVT.    9/7/24: No cardiac complaints; not experiencing palpitations.    9/8/24: awake alert tele SR ventricular Ectopy Bi/trigeminy occasional triplets ( BB recently up titrated)     MEDICATIONS  (STANDING):  acetaminophen     Tablet .. 975 milliGRAM(s) Oral every 8 hours  atorvastatin 10 milliGRAM(s) Oral at bedtime  cefTRIAXone Injectable. 1000 milliGRAM(s) IV Push every 24 hours  chlorhexidine 4% Liquid 1 Application(s) Topical <User Schedule>  clopidogrel Tablet 75 milliGRAM(s) Oral daily  gabapentin 100 milliGRAM(s) Oral every 8 hours  lactated ringers. 1000 milliLiter(s) (75 mL/Hr) IV Continuous <Continuous>  metoprolol tartrate 25 milliGRAM(s) Oral two times a day  pantoprazole    Tablet 40 milliGRAM(s) Oral daily  sucralfate 1 Gram(s) Oral two times a day    MEDICATIONS  (PRN):  HYDROmorphone  Injectable 0.5 milliGRAM(s) IV Push every 3 hours PRN Moderate Pain (4 - 6)  magnesium hydroxide Suspension 30 milliLiter(s) Oral daily PRN Constipation  ondansetron Injectable 4 milliGRAM(s) IV Push every 6 hours PRN Nausea and/or Vomiting  oxyCODONE    IR 10 milliGRAM(s) Oral every 4 hours PRN Severe Pain (7 - 10)  oxyCODONE    IR 5 milliGRAM(s) Oral every 4 hours PRN Moderate Pain (4 - 6)  polyethylene glycol 3350 17 Gram(s) Oral at bedtime PRN Constipation  senna 2 Tablet(s) Oral at bedtime PRN Constipation  traMADol 50 milliGRAM(s) Oral every 4 hours PRN Mild Pain (1 - 3)      Vital Signs Last 24 Hrs  T(C): 37.4 (08 Sep 2024 08:00), Max: 37.7 (07 Sep 2024 21:03)  T(F): 99.3 (08 Sep 2024 08:00), Max: 99.9 (08 Sep 2024 05:00)  HR: 87 (08 Sep 2024 08:00) (87 - 114)  BP: 107/67 (08 Sep 2024 08:00) (107/67 - 122/83)  BP(mean): 94 (08 Sep 2024 05:00) (94 - 94)  RR: 18 (08 Sep 2024 08:00) (18 - 18)  SpO2: 94% (08 Sep 2024 08:00) (91% - 94%)    Parameters below as of 08 Sep 2024 08:00  Patient On (Oxygen Delivery Method): room air      General: Supine in bed, NAD  Cardiac: Irregular, normal rate  Pulmonary: Nonlabored, clear to ausculation  Skin: Warm, dry    Labs:                   9.5    8.95  )-----------( 145      ( 07 Sep 2024 06:37 )             28.2     142  |  112<H>  |  16  ----------------------------<  93  3.4<L>   |  23  |  0.72    Ca    7.7<L>      07 Sep 2024 06:37    TroponinI hsT: 21.38    Tele: SR with frequent PACs, brief PAT/SVT    TTE W or WO Ultrasound Enhancing Agent (09.04.24 @ 12:11) >   1. Left ventricular systolic function is normal with an ejection fraction of 68 % by Marsh's method of disks.   2. Aortic root at the sinuses of Valsalva is dilated, measuring 4.00 cm (indexed 1.59 cm/m²). Ascending aorta diameter is dilated, measuring 3.90 cm (indexed 1.55 cm/m²). Aortic arch diameter is dilated, measuring 4.0 cm (indexed 1.59 cm/m²).           REASON FOR VISIT:  Abn ECG    HPI:  68 year old, man with a history of CVA (residual left hemiplegia), s/p L AKA, CAD s/p CABG, PVD, admitted on 9/2/24 with a R femur fracture; initial operative repair complicated by instability / bleeding and with subsequent episode of SVT.    9/7/24: No cardiac complaints; not experiencing palpitations.    9/8/24: awake alert tele SR ventricular Ectopy Bi/trigeminy occasional triplets ( BB recently up titrated)     MEDICATIONS  (STANDING):  acetaminophen     Tablet .. 975 milliGRAM(s) Oral every 8 hours  atorvastatin 10 milliGRAM(s) Oral at bedtime  cefTRIAXone Injectable. 1000 milliGRAM(s) IV Push every 24 hours  chlorhexidine 4% Liquid 1 Application(s) Topical <User Schedule>  clopidogrel Tablet 75 milliGRAM(s) Oral daily  gabapentin 100 milliGRAM(s) Oral every 8 hours  lactated ringers. 1000 milliLiter(s) (75 mL/Hr) IV Continuous <Continuous>  metoprolol tartrate 25 milliGRAM(s) Oral two times a day  pantoprazole    Tablet 40 milliGRAM(s) Oral daily  sucralfate 1 Gram(s) Oral two times a day    MEDICATIONS  (PRN):  HYDROmorphone  Injectable 0.5 milliGRAM(s) IV Push every 3 hours PRN Moderate Pain (4 - 6)  magnesium hydroxide Suspension 30 milliLiter(s) Oral daily PRN Constipation  ondansetron Injectable 4 milliGRAM(s) IV Push every 6 hours PRN Nausea and/or Vomiting  oxyCODONE    IR 10 milliGRAM(s) Oral every 4 hours PRN Severe Pain (7 - 10)  oxyCODONE    IR 5 milliGRAM(s) Oral every 4 hours PRN Moderate Pain (4 - 6)  polyethylene glycol 3350 17 Gram(s) Oral at bedtime PRN Constipation  senna 2 Tablet(s) Oral at bedtime PRN Constipation  traMADol 50 milliGRAM(s) Oral every 4 hours PRN Mild Pain (1 - 3)    Vital Signs Last 24 Hrs  T(C): 37.4 (08 Sep 2024 08:00), Max: 37.7 (07 Sep 2024 21:03)  T(F): 99.3 (08 Sep 2024 08:00), Max: 99.9 (08 Sep 2024 05:00)  HR: 87 (08 Sep 2024 08:00) (87 - 114)  BP: 107/67 (08 Sep 2024 08:00) (107/67 - 122/83)  BP(mean): 94 (08 Sep 2024 05:00) (94 - 94)  RR: 18 (08 Sep 2024 08:00) (18 - 18)  SpO2: 94% (08 Sep 2024 08:00) (91% - 94%)  Patient On (Oxygen Delivery Method): room air    General: Supine in bed, NAD  Cardiac: Irregular, normal rate  Pulmonary: Nonlabored, clear to ausculation  Skin: Warm, dry    Labs:                   9.5    8.95  )-----------( 145      ( 07 Sep 2024 06:37 )             28.2     142  |  112<H>  |  16  ----------------------------<  93  3.4<L>   |  23  |  0.72    Ca    7.7<L>      07 Sep 2024 06:37    TroponinI hsT: 21.38    Tele: SR with frequent PACs, brief PAT/SVT    TTE W or WO Ultrasound Enhancing Agent (09.04.24 @ 12:11) >   1. Left ventricular systolic function is normal with an ejection fraction of 68 % by Marsh's method of disks.   2. Aortic root at the sinuses of Valsalva is dilated, measuring 4.00 cm (indexed 1.59 cm/m²). Ascending aorta diameter is dilated, measuring 3.90 cm (indexed 1.55 cm/m²). Aortic arch diameter is dilated, measuring 4.0 cm (indexed 1.59 cm/m²).

## 2024-09-08 NOTE — PROGRESS NOTE ADULT - SUBJECTIVE AND OBJECTIVE BOX
69yo male with PMHx CVA residual left hemiplegia, contracted LE. s/p L AKA, CAD s/p CABG, PVD, lives in SNF, wheelchair bound.    [place] with [place].  While on [place] lift getting back into car his R leg was caught and caused excruciating pain causing him to become diaphoretic and vomiting  Found to have  femur fractrue    Downgaded from ICU 9/7. Overnight patient with       Subjective: Patient seen and examined.  services used. episode of SVT on tele and Fever 101.9, was given lopressor and metoprolol PO dose was increased, Patient denies any chest pain or palpitations this AM. UA suspicious for UTI. Will empirically start Rocephin x 3 days.  reports surgical pain is controlled. Hb 8.3. No signs of active bleed.       Additional results/Imaging, I have personally reviewed:      PHYSICAL EXAM:  Gen: NAD  HEENT:  pupils equal and reactive, EOMI, no oropharyngeal lesions, erythema, exudates, oral thrush  NECK:   supple, no carotid bruits, no palpable lymph nodes, no thyromegaly  CV:  +S1, +S2, irregular, no murmurs or rubs  RESP:   lungs clear to auscultation bilaterally, no wheezing, rales, rhonchi, good air entry bilaterally  GI:  abdomen firm, non-tender, non-distended, normal BS, no bruits, no abdominal masses, no palpable masses  : condom cath   MSK:   normal muscle tone, no atrophy, no rigidity, no contractions  Hip externally rotated and knee contracted to 90 degrees flexion Dressing C/D/I  EXT:   VASCULAR:  difficult to palpate pulses   NEURO:  AAOX3, no focal neurological deficits, follows all commands, able to move extremities spontaneously  SKIN:  no ulcers, lesions or rashes      PHYSICAL EXAM:    Daily     Daily     ICU Vital Signs Last 24 Hrs  T(C): 37.4 (08 Sep 2024 08:00), Max: 37.7 (07 Sep 2024 21:03)  T(F): 99.3 (08 Sep 2024 08:00), Max: 99.9 (08 Sep 2024 05:00)  HR: 87 (08 Sep 2024 08:00) (87 - 114)  BP: 107/67 (08 Sep 2024 08:00) (107/67 - 122/83)  BP(mean): 94 (08 Sep 2024 05:00) (94 - 94)  ABP: --  ABP(mean): --  RR: 18 (08 Sep 2024 08:00) (18 - 18)  SpO2: 94% (08 Sep 2024 08:00) (91% - 94%)    O2 Parameters below as of 08 Sep 2024 08:00  Patient On (Oxygen Delivery Method): room air                                    8.3    8.72  )-----------( 150      ( 08 Sep 2024 06:42 )             25.7       CBC Full  -  ( 08 Sep 2024 06:42 )  WBC Count : 8.72 K/uL  RBC Count : 2.73 M/uL  Hemoglobin : 8.3 g/dL  Hematocrit : 25.7 %  Platelet Count - Automated : 150 K/uL  Mean Cell Volume : 94.1 fl  Mean Cell Hemoglobin : 30.4 pg  Mean Cell Hemoglobin Concentration : 32.3 gm/dL  Auto Neutrophil # : x  Auto Lymphocyte # : x  Auto Monocyte # : x  Auto Eosinophil # : x  Auto Basophil # : x  Auto Neutrophil % : x  Auto Lymphocyte % : x  Auto Monocyte % : x  Auto Eosinophil % : x  Auto Basophil % : x      09-08    143  |  114<H>  |  13  ----------------------------<  91  3.6   |  23  |  0.63    Ca    7.5<L>      08 Sep 2024 06:42  Mg     1.9     09-08                      Urinalysis Basic - ( 08 Sep 2024 06:42 )    Color: x / Appearance: x / SG: x / pH: x  Gluc: 91 mg/dL / Ketone: x  / Bili: x / Urobili: x   Blood: x / Protein: x / Nitrite: x   Leuk Esterase: x / RBC: x / WBC x   Sq Epi: x / Non Sq Epi: x / Bacteria: x            MEDICATIONS  (STANDING):  acetaminophen     Tablet .. 975 milliGRAM(s) Oral every 8 hours  atorvastatin 10 milliGRAM(s) Oral at bedtime  cefTRIAXone Injectable. 1000 milliGRAM(s) IV Push every 24 hours  chlorhexidine 4% Liquid 1 Application(s) Topical <User Schedule>  clopidogrel Tablet 75 milliGRAM(s) Oral daily  gabapentin 100 milliGRAM(s) Oral every 8 hours  lactated ringers. 1000 milliLiter(s) (75 mL/Hr) IV Continuous <Continuous>  metoprolol tartrate 25 milliGRAM(s) Oral two times a day  pantoprazole    Tablet 40 milliGRAM(s) Oral daily  sucralfate 1 Gram(s) Oral two times a day        CULTURES:  Blood, Urine: Negative (09-06 @ 17:50)      Telemetry, personally reviewed

## 2024-09-08 NOTE — PROGRESS NOTE ADULT - SUBJECTIVE AND OBJECTIVE BOX
Pt seen and examined at bedside. No acute events overnight. Pain controlled, denies any numbness or tingling. Denies nausea, vomiting, chest pain, or shortness of breath.       LABS:                        8.3    8.72  )-----------( 150      ( 08 Sep 2024 06:42 )             25.7     09-08    143  |  114<H>  |  13  ----------------------------<  91  3.6   |  23  |  0.63    Ca    7.5<L>      08 Sep 2024 06:42        Urinalysis Basic - ( 08 Sep 2024 06:42 )    Color: x / Appearance: x / SG: x / pH: x  Gluc: 91 mg/dL / Ketone: x  / Bili: x / Urobili: x   Blood: x / Protein: x / Nitrite: x   Leuk Esterase: x / RBC: x / WBC x   Sq Epi: x / Non Sq Epi: x / Bacteria: x        VITAL SIGNS:  T(C): 37.7 (09-08-24 @ 05:00), Max: 37.7 (09-07-24 @ 21:03)  HR: 106 (09-08-24 @ 05:00) (100 - 114)  BP: 122/83 (09-08-24 @ 05:00) (119/74 - 122/83)  RR: 18 (09-08-24 @ 05:00) (18 - 18)  SpO2: 94% (09-08-24 @ 05:00) (91% - 94%)        Exam:  Gen: NAD  Right Lower Extremity:  Hip externally rotated and knee contracted to 90 degrees flexion  Dressing clean/dry/intact, ace in place  Soft compartments  Negative calf ttp  +EHL/FHL/TA/GSc  SILT L2-S1  DP+ Doppler      A/P: 68M w/ subtrochanteric fx sp IMN POD 3, doing well, pending SNF placement    - Weight bearing for transfers, Steve lift to chair allowed only  - PT/OT  - Follow up AM labs  - Pain control as needed  - DVT ppx per primary  - Encourage IS  - Dispo: SNF per PT    Will discuss with Dr. Chambers and advise of any changes to the plan.

## 2024-09-08 NOTE — PROGRESS NOTE ADULT - PROBLEM SELECTOR PLAN 1
Frequent PVC's/Bi/Trigeminy, BB recently up titrated will continue to monitor, Echo shows NL LV SYS FX EF 68%, Potassium NL, obtain Magnesium Frequent PVC's/Bi/Trigeminy, BB recently up titrated will continue to monitor, Echo shows NL LV SYS FX EF 68%, Potassium NL, obtain Magnesium. Continue metoprolol.

## 2024-09-08 NOTE — PROGRESS NOTE ADULT - ASSESSMENT
# R femur fracture s/p R Hip IMN complicated OR procedure due to hypotension and hemorrhage s/p 2 u PRBC   -Multi modal pain regiment  -Ortho pedic following  -Incentive spirometer   -PT/OT  -Restart AC in AM if H/h Stable     #Paroxysmal Afib , episodes of SVT  Metoprolol increased to 25 mg BID  Cardiology consulted, recommendations appreciated   Continue to replete K    scrotal edema   testicular US   urology consult    Hypokalemia  Maintain k>4       #SIRS criteria with fever and tachycardia, leukocytosis   -UA indeterminate  Follow up blood and urine cultures  Will empirically start Ceftriaxone x 3 days   US RLE Venous  -1.  No evidence of right lower extremity deep venous thrombosis.  2.  Nonvisualized/nonevaluable infrapopliteal vasculature.      Stable enlarged aortic shadow on XR   CTA Chest -reviewed    #CAD s/p CABG  #PAD      Code status: Full  Diet: regular  DVT ppx possible lovenox if h/h stable   Activity: Bedrest
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
## 2024-09-09 LAB
ANION GAP SERPL CALC-SCNC: 5 MMOL/L — SIGNIFICANT CHANGE UP (ref 5–17)
BASOPHILS # BLD AUTO: 0.07 K/UL — SIGNIFICANT CHANGE UP (ref 0–0.2)
BASOPHILS NFR BLD AUTO: 0.9 % — SIGNIFICANT CHANGE UP (ref 0–2)
BUN SERPL-MCNC: 14 MG/DL — SIGNIFICANT CHANGE UP (ref 7–23)
CALCIUM SERPL-MCNC: 7.5 MG/DL — LOW (ref 8.5–10.1)
CHLORIDE SERPL-SCNC: 110 MMOL/L — HIGH (ref 96–108)
CO2 SERPL-SCNC: 25 MMOL/L — SIGNIFICANT CHANGE UP (ref 22–31)
CREAT SERPL-MCNC: 0.66 MG/DL — SIGNIFICANT CHANGE UP (ref 0.5–1.3)
EGFR: 101 ML/MIN/1.73M2 — SIGNIFICANT CHANGE UP
EOSINOPHIL # BLD AUTO: 0.51 K/UL — HIGH (ref 0–0.5)
EOSINOPHIL NFR BLD AUTO: 6.8 % — HIGH (ref 0–6)
GLUCOSE SERPL-MCNC: 87 MG/DL — SIGNIFICANT CHANGE UP (ref 70–99)
HCT VFR BLD CALC: 25.3 % — LOW (ref 39–50)
HGB BLD-MCNC: 8.2 G/DL — LOW (ref 13–17)
IMM GRANULOCYTES NFR BLD AUTO: 0.7 % — SIGNIFICANT CHANGE UP (ref 0–0.9)
LYMPHOCYTES # BLD AUTO: 1.56 K/UL — SIGNIFICANT CHANGE UP (ref 1–3.3)
LYMPHOCYTES # BLD AUTO: 20.9 % — SIGNIFICANT CHANGE UP (ref 13–44)
MCHC RBC-ENTMCNC: 31.1 PG — SIGNIFICANT CHANGE UP (ref 27–34)
MCHC RBC-ENTMCNC: 32.4 GM/DL — SIGNIFICANT CHANGE UP (ref 32–36)
MCV RBC AUTO: 95.8 FL — SIGNIFICANT CHANGE UP (ref 80–100)
MONOCYTES # BLD AUTO: 0.47 K/UL — SIGNIFICANT CHANGE UP (ref 0–0.9)
MONOCYTES NFR BLD AUTO: 6.3 % — SIGNIFICANT CHANGE UP (ref 2–14)
NEUTROPHILS # BLD AUTO: 4.82 K/UL — SIGNIFICANT CHANGE UP (ref 1.8–7.4)
NEUTROPHILS NFR BLD AUTO: 64.4 % — SIGNIFICANT CHANGE UP (ref 43–77)
PLATELET # BLD AUTO: 189 K/UL — SIGNIFICANT CHANGE UP (ref 150–400)
POTASSIUM SERPL-MCNC: 3.5 MMOL/L — SIGNIFICANT CHANGE UP (ref 3.5–5.3)
POTASSIUM SERPL-SCNC: 3.5 MMOL/L — SIGNIFICANT CHANGE UP (ref 3.5–5.3)
RBC # BLD: 2.64 M/UL — LOW (ref 4.2–5.8)
RBC # FLD: 15.7 % — HIGH (ref 10.3–14.5)
SODIUM SERPL-SCNC: 140 MMOL/L — SIGNIFICANT CHANGE UP (ref 135–145)
WBC # BLD: 7.48 K/UL — SIGNIFICANT CHANGE UP (ref 3.8–10.5)
WBC # FLD AUTO: 7.48 K/UL — SIGNIFICANT CHANGE UP (ref 3.8–10.5)

## 2024-09-09 PROCEDURE — 99232 SBSQ HOSP IP/OBS MODERATE 35: CPT

## 2024-09-09 PROCEDURE — 76870 US EXAM SCROTUM: CPT | Mod: 26

## 2024-09-09 RX ORDER — ENOXAPARIN SODIUM 100 MG/ML
40 INJECTION SUBCUTANEOUS EVERY 24 HOURS
Refills: 0 | Status: DISCONTINUED | OUTPATIENT
Start: 2024-09-09 | End: 2024-09-11

## 2024-09-09 RX ORDER — FUROSEMIDE 40 MG
20 TABLET ORAL DAILY
Refills: 0 | Status: DISCONTINUED | OUTPATIENT
Start: 2024-09-10 | End: 2024-09-11

## 2024-09-09 RX ORDER — FUROSEMIDE 40 MG
20 TABLET ORAL ONCE
Refills: 0 | Status: COMPLETED | OUTPATIENT
Start: 2024-09-09 | End: 2024-09-09

## 2024-09-09 RX ORDER — CEFUROXIME SODIUM 1.5 G
250 VIAL (EA) INJECTION EVERY 12 HOURS
Refills: 0 | Status: COMPLETED | OUTPATIENT
Start: 2024-09-09 | End: 2024-09-11

## 2024-09-09 RX ADMIN — Medication 100 MILLIGRAM(S): at 14:31

## 2024-09-09 RX ADMIN — Medication 75 MILLIGRAM(S): at 10:16

## 2024-09-09 RX ADMIN — METOPROLOL TARTRATE 25 MILLIGRAM(S): 100 TABLET ORAL at 17:42

## 2024-09-09 RX ADMIN — ACETAMINOPHEN 975 MILLIGRAM(S): 325 TABLET ORAL at 05:16

## 2024-09-09 RX ADMIN — ACETAMINOPHEN 975 MILLIGRAM(S): 325 TABLET ORAL at 14:31

## 2024-09-09 RX ADMIN — Medication 100 MILLIGRAM(S): at 05:16

## 2024-09-09 RX ADMIN — Medication 40 MILLIGRAM(S): at 10:16

## 2024-09-09 RX ADMIN — Medication 250 MILLIGRAM(S): at 23:22

## 2024-09-09 RX ADMIN — OXYCODONE HYDROCHLORIDE 10 MILLIGRAM(S): 5 TABLET ORAL at 12:38

## 2024-09-09 RX ADMIN — METOPROLOL TARTRATE 25 MILLIGRAM(S): 100 TABLET ORAL at 05:17

## 2024-09-09 RX ADMIN — Medication 100 MILLIGRAM(S): at 23:22

## 2024-09-09 RX ADMIN — Medication 10 MILLIGRAM(S): at 23:22

## 2024-09-09 RX ADMIN — Medication 20 MILLIGRAM(S): at 10:16

## 2024-09-09 RX ADMIN — SUCRALFATE 1 GRAM(S): 1 SUSPENSION ORAL at 23:23

## 2024-09-09 RX ADMIN — ACETAMINOPHEN 975 MILLIGRAM(S): 325 TABLET ORAL at 05:47

## 2024-09-09 RX ADMIN — ACETAMINOPHEN 975 MILLIGRAM(S): 325 TABLET ORAL at 15:01

## 2024-09-09 RX ADMIN — ENOXAPARIN SODIUM 40 MILLIGRAM(S): 100 INJECTION SUBCUTANEOUS at 23:21

## 2024-09-09 RX ADMIN — OXYCODONE HYDROCHLORIDE 10 MILLIGRAM(S): 5 TABLET ORAL at 11:38

## 2024-09-09 RX ADMIN — ACETAMINOPHEN 975 MILLIGRAM(S): 325 TABLET ORAL at 23:21

## 2024-09-09 RX ADMIN — SUCRALFATE 1 GRAM(S): 1 SUSPENSION ORAL at 10:16

## 2024-09-09 NOTE — PROGRESS NOTE ADULT - ASSESSMENT
# R femur fracture s/p R Hip IMN complicated OR procedure due to hypotension and hemorrhage s/p 2 u PRBC   -Multi modal pain regiment  -Ortho pedic following  -Incentive spirometer   -PT/OT  -Restart AC in AM if H/h Stable     #Paroxysmal Afib , episodes of SVT  Metoprolol increased to 25 mg BID  Cardiology consulted, recommendations appreciated   Continue to replete K    scrotal edema   testicular US iv laisx    urology consult    Hypokalemia  Maintain k>4       #SIRS criteria with fever and tachycardia, leukocytosis   -UA indeterminate  Follow up blood and urine cultures  Will empirically start Ceftriaxone x 3 days US RLE Venous  -1.  No evidence of right lower extremity deep venous thrombosis.  2.  Nonvisualized/nonevaluable infrapopliteal vasculature.      Stable enlarged aortic shadow on XR   CTA Chest -reviewed    #CAD s/p CABG  #PAD      Code status: Full  Diet: regular  DVT ppx possible lovenox if h/h stable   Activity: Bedrest

## 2024-09-09 NOTE — PROGRESS NOTE ADULT - SUBJECTIVE AND OBJECTIVE BOX
Subjective:    pat better, no new complaint, lying in bed, RA 98%.    Home Medications:  A &amp; D Ointment: 1 application to right heel as needed after cleaning (02 Sep 2024 22:03)  amLODIPine 5 mg oral tablet: 1 tab(s) orally once a day ,hold for sbp below 100 (02 Sep 2024 22:02)  atorvastatin 20 mg oral tablet: 1 tab(s) orally once a day (at bedtime) (02 Sep 2024 22:03)  clopidogrel 75 mg oral tablet: 1 tab(s) orally once a day (02 Sep 2024 22:02)  gabapentin 100 mg oral capsule: 1 cap(s) orally 2 times a day (02 Sep 2024 22:03)  gabapentin 100 mg oral capsule: 2 cap(s) orally once a day (at bedtime) (02 Sep 2024 22:03)  Melatonin 3 mg oral tablet: 3 tab(s) orally once a day (at bedtime) (02 Sep 2024 22:03)  metoprolol tartrate 25 mg oral tablet: 1 tab(s) orally 2 times a day ,hold for sbp below 100 or hr below 60 (02 Sep 2024 22:02)  Milk of Magnesia 400 mg / 5 mL: Take 30 mL by mouth as needed if no bowel movement x 3 days (02 Sep 2024 22:03)  nystatin 100,000 units/g topical powder: 1 application topically every 8 hours (02 Sep 2024 22:02)  sucralfate 1 g oral tablet: 1 tab(s) orally 2 times a day (02 Sep 2024 22:02)  zinc oxide topical: 1 application to bilateral buttocks three times a day (02 Sep 2024 22:03)    MEDICATIONS  (STANDING):  acetaminophen     Tablet .. 975 milliGRAM(s) Oral every 8 hours  atorvastatin 10 milliGRAM(s) Oral at bedtime  cefuroxime   Tablet 250 milliGRAM(s) Oral every 12 hours  chlorhexidine 4% Liquid 1 Application(s) Topical <User Schedule>  clopidogrel Tablet 75 milliGRAM(s) Oral daily  gabapentin 100 milliGRAM(s) Oral every 8 hours  metoprolol tartrate 25 milliGRAM(s) Oral two times a day  pantoprazole    Tablet 40 milliGRAM(s) Oral daily  sucralfate 1 Gram(s) Oral two times a day    MEDICATIONS  (PRN):  HYDROmorphone  Injectable 0.5 milliGRAM(s) IV Push every 3 hours PRN Moderate Pain (4 - 6)  magnesium hydroxide Suspension 30 milliLiter(s) Oral daily PRN Constipation  ondansetron Injectable 4 milliGRAM(s) IV Push every 6 hours PRN Nausea and/or Vomiting  oxyCODONE    IR 5 milliGRAM(s) Oral every 4 hours PRN Moderate Pain (4 - 6)  oxyCODONE    IR 10 milliGRAM(s) Oral every 4 hours PRN Severe Pain (7 - 10)  polyethylene glycol 3350 17 Gram(s) Oral at bedtime PRN Constipation  senna 2 Tablet(s) Oral at bedtime PRN Constipation  traMADol 50 milliGRAM(s) Oral every 4 hours PRN Mild Pain (1 - 3)      Allergies    No Known Allergies    Intolerances        Vital Signs Last 24 Hrs  T(C): 36.7 (09 Sep 2024 07:29), Max: 36.9 (08 Sep 2024 20:11)  T(F): 98.1 (09 Sep 2024 07:29), Max: 98.4 (08 Sep 2024 20:11)  HR: 76 (09 Sep 2024 07:29) (76 - 99)  BP: 121/76 (09 Sep 2024 07:29) (95/67 - 126/74)  BP(mean): 90 (09 Sep 2024 05:41) (90 - 90)  RR: 18 (09 Sep 2024 07:29) (17 - 18)  SpO2: 98% (09 Sep 2024 07:29) (93% - 100%)    Parameters below as of 09 Sep 2024 07:29  Patient On (Oxygen Delivery Method): room air          PHYSICAL EXAMINATION:    NECK:  Supple. No lymphadenopathy. Jugular venous pressure not elevated. Carotids equal.   HEART:   The cardiac impulse has a normal quality. Reg., Nl S1 and S2.  There are no murmurs, rubs or gallops noted  CHEST:  Chest crackles to auscultation. Normal respiratory effort.  ABDOMEN:  Soft and nontender.   EXTREMITIES:  There is no edema.       LABS:                        8.2    7.48  )-----------( 189      ( 09 Sep 2024 06:59 )             25.3     09-09    140  |  110<H>  |  14  ----------------------------<  87  3.5   |  25  |  0.66    Ca    7.5<L>      09 Sep 2024 06:59  Mg     1.9     09-08        Urinalysis Basic - ( 09 Sep 2024 06:59 )    Color: x / Appearance: x / SG: x / pH: x  Gluc: 87 mg/dL / Ketone: x  / Bili: x / Urobili: x   Blood: x / Protein: x / Nitrite: x   Leuk Esterase: x / RBC: x / WBC x   Sq Epi: x / Non Sq Epi: x / Bacteria: x

## 2024-09-09 NOTE — PROGRESS NOTE ADULT - SUBJECTIVE AND OBJECTIVE BOX
Pt seen and examined at bedside. No acute events overnight. Pain controlled, denies any numbness or tingling. Denies nausea, vomiting, chest pain, or shortness of breath. Patient minimally cooperative this morning due to sleepiness and pain medications. Patient was lifted into chair yesterday.    Vital Signs (24 Hrs):  T(C): 36.9 (09-08-24 @ 20:11), Max: 37.4 (09-08-24 @ 08:00)  HR: 85 (09-09-24 @ 05:41) (85 - 99)  BP: 126/74 (09-09-24 @ 05:41) (95/67 - 126/74)  RR: 18 (09-09-24 @ 05:41) (17 - 18)  SpO2: 100% (09-09-24 @ 05:41) (93% - 100%)  Wt(kg): --    LABS:                          8.5    x     )-----------( x        ( 08 Sep 2024 17:07 )             25.8     09-08    143  |  114<H>  |  13  ----------------------------<  91  3.6   |  23  |  0.63    Ca    7.5<L>      08 Sep 2024 06:42  Mg     1.9     09-08      Exam:  Gen: NAD  Right Lower Extremity:  Diffuse swelling noted  Hip externally rotated and knee contracted to 90 degrees flexion  Dressing clean/dry/intact, ace in place  Soft compartments  Negative calf ttp  +EHL/FHL/TA/GSc  SILT L2-S1  DP+ Dopplerable      A/P: 68M w/ subtrochanteric fx sp IMN POD 4, doing well, pending SNF placement    - Weight bearing for transfers, Steve lift to chair allowed only  - FU Uro consult for scrotal swelling  - BCx: NGTD  - BLLE Dopplers negative  - PT/OT  - Follow up AM labs  - Pain control as needed  - DVT ppx per primary  - Encourage IS  - Dispo: SNF per PT  - medical management recommended for diffuse RLE swelling  Will discuss with Dr. Chambers and advise of any changes to the plan.

## 2024-09-09 NOTE — PROGRESS NOTE ADULT - ASSESSMENT
PROBLEMS:    Mild pulmonary interstitial edema and trace right pleural effusion.  R femur fracture s/p R Hip IMN complicated OR procedure due to hypotension and hemorrhage s/p 2 u PRBC Paroxysmal Afib , episodes of SVT  CAD s/p CABG  PAD    PLAN:    pulmonary stable-no acute issues  Pain control  Incentive spirometer   PT/OT  Restart AC if H/h Stable   SIRS criteria with fever and tachycardia, leukocytosis- po ceftin   DVT ppx possible lovenox if h/h stable

## 2024-09-09 NOTE — PROGRESS NOTE ADULT - SUBJECTIVE AND OBJECTIVE BOX
REASON FOR VISIT:  Abn ECG    HPI:  68 year old, man with a history of CVA (residual left hemiplegia), s/p L AKA, CAD s/p CABG, PVD, admitted on 9/2/24 with a R femur fracture; initial operative repair complicated by instability / bleeding and with subsequent episode of SVT.    9/7/24: No cardiac complaints; not experiencing palpitations.    9/8/24: awake alert tele SR ventricular Ectopy Bi/trigeminy occasional triplets ( BB recently up titrated)   9/9/'24: no complaints.   reviewed tele: pvc triplets x2, Sep 8th 730a - AT v. MAT 12 sec, Sep 8th 1710 -  bpm 6 sec   Per sep cardio 4th note - "sustained SVT runs overnight"    MEDICATIONS:  OUTPATIENT  Home Medications:  A &amp; D Ointment: 1 application to right heel as needed after cleaning (02 Sep 2024 22:03)  amLODIPine 5 mg oral tablet: 1 tab(s) orally once a day ,hold for sbp below 100 (02 Sep 2024 22:02)  atorvastatin 20 mg oral tablet: 1 tab(s) orally once a day (at bedtime) (02 Sep 2024 22:03)  clopidogrel 75 mg oral tablet: 1 tab(s) orally once a day (02 Sep 2024 22:02)  gabapentin 100 mg oral capsule: 1 cap(s) orally 2 times a day (02 Sep 2024 22:03)  gabapentin 100 mg oral capsule: 2 cap(s) orally once a day (at bedtime) (02 Sep 2024 22:03)  Melatonin 3 mg oral tablet: 3 tab(s) orally once a day (at bedtime) (02 Sep 2024 22:03)  metoprolol tartrate 25 mg oral tablet: 1 tab(s) orally 2 times a day ,hold for sbp below 100 or hr below 60 (02 Sep 2024 22:02)  Milk of Magnesia 400 mg / 5 mL: Take 30 mL by mouth as needed if no bowel movement x 3 days (02 Sep 2024 22:03)  nystatin 100,000 units/g topical powder: 1 application topically every 8 hours (02 Sep 2024 22:02)  sucralfate 1 g oral tablet: 1 tab(s) orally 2 times a day (02 Sep 2024 22:02)  zinc oxide topical: 1 application to bilateral buttocks three times a day (02 Sep 2024 22:03)      INPATIENT  MEDICATIONS  (STANDING):  acetaminophen     Tablet .. 975 milliGRAM(s) Oral every 8 hours  atorvastatin 10 milliGRAM(s) Oral at bedtime  cefTRIAXone Injectable. 1000 milliGRAM(s) IV Push every 24 hours  chlorhexidine 4% Liquid 1 Application(s) Topical <User Schedule>  clopidogrel Tablet 75 milliGRAM(s) Oral daily  gabapentin 100 milliGRAM(s) Oral every 8 hours  metoprolol tartrate 25 milliGRAM(s) Oral two times a day  pantoprazole    Tablet 40 milliGRAM(s) Oral daily  sucralfate 1 Gram(s) Oral two times a day    MEDICATIONS  (PRN):  HYDROmorphone  Injectable 0.5 milliGRAM(s) IV Push every 3 hours PRN Moderate Pain (4 - 6)  magnesium hydroxide Suspension 30 milliLiter(s) Oral daily PRN Constipation  ondansetron Injectable 4 milliGRAM(s) IV Push every 6 hours PRN Nausea and/or Vomiting  oxyCODONE    IR 5 milliGRAM(s) Oral every 4 hours PRN Moderate Pain (4 - 6)  oxyCODONE    IR 10 milliGRAM(s) Oral every 4 hours PRN Severe Pain (7 - 10)  polyethylene glycol 3350 17 Gram(s) Oral at bedtime PRN Constipation  senna 2 Tablet(s) Oral at bedtime PRN Constipation  traMADol 50 milliGRAM(s) Oral every 4 hours PRN Mild Pain (1 - 3)          Vital Signs Last 24 Hrs  T(C): 36.7 (09 Sep 2024 07:29), Max: 36.9 (08 Sep 2024 20:11)  T(F): 98.1 (09 Sep 2024 07:29), Max: 98.4 (08 Sep 2024 20:11)  HR: 76 (09 Sep 2024 07:29) (76 - 99)  BP: 121/76 (09 Sep 2024 07:29) (95/67 - 126/74)  BP(mean): 90 (09 Sep 2024 05:41) (90 - 90)  RR: 18 (09 Sep 2024 07:29) (17 - 18)  SpO2: 98% (09 Sep 2024 07:29) (93% - 100%)    Parameters below as of 09 Sep 2024 07:29  Patient On (Oxygen Delivery Method): room air    Daily     Daily I&O's Summary      I&O's Detail      I&O's Summary      PHYSICAL EXAM:  General: Supine in bed, NAD  Cardiac: Irregular, normal rate  Pulmonary: Nonlabored, clear to ausculation  Skin: Warm, dry        ===============================  ===============================  LABS:                         8.2    7.48  )-----------( 189      ( 09 Sep 2024 06:59 )             25.3                         8.5    x     )-----------( x        ( 08 Sep 2024 17:07 )             25.8                         8.3    8.72  )-----------( 150      ( 08 Sep 2024 06:42 )             25.7     09 Sep 2024 06:59    140    |  110    |  14     ----------------------------<  87     3.5     |  25     |  0.66   08 Sep 2024 06:42    143    |  114    |  13     ----------------------------<  91     3.6     |  23     |  0.63   07 Sep 2024 06:37    142    |  112    |  16     ----------------------------<  93     3.4     |  23     |  0.72     Ca    7.5        09 Sep 2024 06:59  Ca    7.5        08 Sep 2024 06:42  Ca    7.7        07 Sep 2024 06:37  Mg     1.9       08 Sep 2024 12:05        ===============================  ===============================  CARDIAC BIOMARKERS:  BNP      TROPONIN  Troponin I, High Sensitivity Result: 21.38 ng/L (09-03-24 @ 17:40)  Troponin I, Serum: <.015 ng/mL [.015 - .045] (05-24-19 @ 10:35)  Troponin I, Serum: <.015 ng/mL [.015 - .045] (05-24-19 @ 03:17)  Troponin I, Serum: <.015 ng/mL [.015 - .045] (05-23-19 @ 18:43)    ===============================  ===============================    TTE W or WO Ultrasound Enhancing Agent (09.04.24 @ 12:11) >   1. Left ventricular systolic function is normal with an ejection fraction of 68 % by Marsh's method of disks.   2. Aortic root at the sinuses of Valsalva is dilated, measuring 4.00 cm (indexed 1.59 cm/m²).   Ascending aorta diameter is dilated, measuring 3.90 cm (indexed 1.55 cm/m²).   Aortic arch diameter is dilated, measuring 4.0 cm (indexed 1.59 cm/m²).

## 2024-09-09 NOTE — PROGRESS NOTE ADULT - PROBLEM SELECTOR PLAN 1
Frequent PVC's/Bi/Trigeminy, BB recently up titrated will continue to monitor, Echo shows NL LV SYS FX EF 68%, Potassium NL, obtain Magnesium. Continue metoprolol. Frequent PVC's/Bi/Trigeminy, BB recently up titrated will continue to monitor, Echo shows NL LV SYS FX EF 68%, Potassium NL, mag WNL. Continue metoprolol.

## 2024-09-09 NOTE — PROGRESS NOTE ADULT - SUBJECTIVE AND OBJECTIVE BOX
69yo male with PMHx CVA residual left hemiplegia, contracted LE. s/p L AKA, CAD s/p CABG, PVD, lives in SNF, wheelchair bound.    went [place] the mall with his [place].  While on hoyar lift getting back into car his R leg was caught and caused excruciating pain causing him to become diaphoretic and vomiting  Found to have  femur fractrue    Downgaded from ICU 9/7. Overnight patient with       Subjective: Patient seen and examined.  services used. episode of SVT on tele and Fever 101.9, was given lopressor and metoprolol PO dose was increased, Patient denies any chest pain or palpitations this AM. UA suspicious for UTI. Will empirically start Rocephin x 3 days.  reports surgical pain is controlled. Hb 8.3. No signs of active bleed. Additional results/Imaging, I have personally reviewed:      PHYSICAL EXAM:  Gen: NAD  HEENT:  pupils equal and reactive, EOMI, no oropharyngeal lesions, erythema, exudates, oral thrush  NECK:   supple, no carotid bruits, no palpable lymph nodes, no thyromegaly  CV:  +S1, +S2, irregular, no murmurs or rubs  RESP:   lungs clear to auscultation bilaterally, no wheezing, rales, rhonchi, good air entry bilaterally  GI:  abdomen firm, non-tender, non-distended, normal BS, no bruits, no abdominal masses, no palpable masses  : condom cath   MSK:   normal muscle tone, no atrophy, no rigidity, no contractions  Hip externally rotated and knee contracted to 90 degrees flexion Dressing C/D/I  EXT:   VASCULAR:  difficult to palpate pulses   NEURO:  AAOX3, no focal neurological deficits, follows all commands, able to move extremities spontaneously  SKIN:  no ulcers, lesions or rashesCULTURES:  Blood, Urine: Negative (09-06 @ 17:50)      Telemetry, personally reviewed

## 2024-09-10 LAB
ANION GAP SERPL CALC-SCNC: 7 MMOL/L — SIGNIFICANT CHANGE UP (ref 5–17)
BASOPHILS # BLD AUTO: 0.08 K/UL — SIGNIFICANT CHANGE UP (ref 0–0.2)
BASOPHILS NFR BLD AUTO: 1.2 % — SIGNIFICANT CHANGE UP (ref 0–2)
BUN SERPL-MCNC: 13 MG/DL — SIGNIFICANT CHANGE UP (ref 7–23)
CALCIUM SERPL-MCNC: 8 MG/DL — LOW (ref 8.5–10.1)
CHLORIDE SERPL-SCNC: 110 MMOL/L — HIGH (ref 96–108)
CO2 SERPL-SCNC: 25 MMOL/L — SIGNIFICANT CHANGE UP (ref 22–31)
CREAT SERPL-MCNC: 0.69 MG/DL — SIGNIFICANT CHANGE UP (ref 0.5–1.3)
EGFR: 100 ML/MIN/1.73M2 — SIGNIFICANT CHANGE UP
EOSINOPHIL # BLD AUTO: 0.4 K/UL — SIGNIFICANT CHANGE UP (ref 0–0.5)
EOSINOPHIL NFR BLD AUTO: 5.9 % — SIGNIFICANT CHANGE UP (ref 0–6)
GLUCOSE SERPL-MCNC: 96 MG/DL — SIGNIFICANT CHANGE UP (ref 70–99)
HCT VFR BLD CALC: 25.4 % — LOW (ref 39–50)
HGB BLD-MCNC: 8.1 G/DL — LOW (ref 13–17)
IMM GRANULOCYTES NFR BLD AUTO: 0.7 % — SIGNIFICANT CHANGE UP (ref 0–0.9)
LYMPHOCYTES # BLD AUTO: 1.69 K/UL — SIGNIFICANT CHANGE UP (ref 1–3.3)
LYMPHOCYTES # BLD AUTO: 24.8 % — SIGNIFICANT CHANGE UP (ref 13–44)
MCHC RBC-ENTMCNC: 30.3 PG — SIGNIFICANT CHANGE UP (ref 27–34)
MCHC RBC-ENTMCNC: 31.9 GM/DL — LOW (ref 32–36)
MCV RBC AUTO: 95.1 FL — SIGNIFICANT CHANGE UP (ref 80–100)
MONOCYTES # BLD AUTO: 0.51 K/UL — SIGNIFICANT CHANGE UP (ref 0–0.9)
MONOCYTES NFR BLD AUTO: 7.5 % — SIGNIFICANT CHANGE UP (ref 2–14)
NEUTROPHILS # BLD AUTO: 4.08 K/UL — SIGNIFICANT CHANGE UP (ref 1.8–7.4)
NEUTROPHILS NFR BLD AUTO: 59.9 % — SIGNIFICANT CHANGE UP (ref 43–77)
PLATELET # BLD AUTO: 251 K/UL — SIGNIFICANT CHANGE UP (ref 150–400)
POTASSIUM SERPL-MCNC: 3.3 MMOL/L — LOW (ref 3.5–5.3)
POTASSIUM SERPL-SCNC: 3.3 MMOL/L — LOW (ref 3.5–5.3)
RBC # BLD: 2.67 M/UL — LOW (ref 4.2–5.8)
RBC # FLD: 15.5 % — HIGH (ref 10.3–14.5)
SODIUM SERPL-SCNC: 142 MMOL/L — SIGNIFICANT CHANGE UP (ref 135–145)
WBC # BLD: 6.81 K/UL — SIGNIFICANT CHANGE UP (ref 3.8–10.5)
WBC # FLD AUTO: 6.81 K/UL — SIGNIFICANT CHANGE UP (ref 3.8–10.5)

## 2024-09-10 PROCEDURE — 99232 SBSQ HOSP IP/OBS MODERATE 35: CPT

## 2024-09-10 RX ORDER — POTASSIUM CHLORIDE 10 MEQ
40 TABLET, EXT RELEASE, PARTICLES/CRYSTALS ORAL ONCE
Refills: 0 | Status: COMPLETED | OUTPATIENT
Start: 2024-09-10 | End: 2024-09-10

## 2024-09-10 RX ORDER — FUROSEMIDE 40 MG
20 TABLET ORAL ONCE
Refills: 0 | Status: COMPLETED | OUTPATIENT
Start: 2024-09-10 | End: 2024-09-10

## 2024-09-10 RX ADMIN — Medication 75 MILLIGRAM(S): at 10:35

## 2024-09-10 RX ADMIN — CHLORHEXIDINE GLUCONATE 1 APPLICATION(S): 40 SOLUTION TOPICAL at 05:38

## 2024-09-10 RX ADMIN — METOPROLOL TARTRATE 25 MILLIGRAM(S): 100 TABLET ORAL at 05:35

## 2024-09-10 RX ADMIN — OXYCODONE HYDROCHLORIDE 5 MILLIGRAM(S): 5 TABLET ORAL at 05:36

## 2024-09-10 RX ADMIN — ACETAMINOPHEN 975 MILLIGRAM(S): 325 TABLET ORAL at 16:55

## 2024-09-10 RX ADMIN — Medication 100 MILLIGRAM(S): at 05:36

## 2024-09-10 RX ADMIN — ACETAMINOPHEN 975 MILLIGRAM(S): 325 TABLET ORAL at 00:19

## 2024-09-10 RX ADMIN — METOPROLOL TARTRATE 25 MILLIGRAM(S): 100 TABLET ORAL at 17:36

## 2024-09-10 RX ADMIN — Medication 10 MILLIGRAM(S): at 21:30

## 2024-09-10 RX ADMIN — Medication 20 MILLIGRAM(S): at 17:47

## 2024-09-10 RX ADMIN — Medication 100 MILLIGRAM(S): at 13:23

## 2024-09-10 RX ADMIN — Medication 20 MILLIGRAM(S): at 10:35

## 2024-09-10 RX ADMIN — OXYCODONE HYDROCHLORIDE 5 MILLIGRAM(S): 5 TABLET ORAL at 06:36

## 2024-09-10 RX ADMIN — ACETAMINOPHEN 975 MILLIGRAM(S): 325 TABLET ORAL at 05:36

## 2024-09-10 RX ADMIN — OXYCODONE HYDROCHLORIDE 10 MILLIGRAM(S): 5 TABLET ORAL at 22:02

## 2024-09-10 RX ADMIN — SUCRALFATE 1 GRAM(S): 1 SUSPENSION ORAL at 21:31

## 2024-09-10 RX ADMIN — ACETAMINOPHEN 975 MILLIGRAM(S): 325 TABLET ORAL at 21:29

## 2024-09-10 RX ADMIN — ACETAMINOPHEN 975 MILLIGRAM(S): 325 TABLET ORAL at 06:50

## 2024-09-10 RX ADMIN — SUCRALFATE 1 GRAM(S): 1 SUSPENSION ORAL at 10:35

## 2024-09-10 RX ADMIN — ACETAMINOPHEN 975 MILLIGRAM(S): 325 TABLET ORAL at 13:23

## 2024-09-10 RX ADMIN — Medication 40 MILLIEQUIVALENT(S): at 17:36

## 2024-09-10 RX ADMIN — OXYCODONE HYDROCHLORIDE 10 MILLIGRAM(S): 5 TABLET ORAL at 21:32

## 2024-09-10 RX ADMIN — Medication 40 MILLIGRAM(S): at 10:38

## 2024-09-10 RX ADMIN — Medication 250 MILLIGRAM(S): at 10:36

## 2024-09-10 RX ADMIN — Medication 100 MILLIGRAM(S): at 21:31

## 2024-09-10 RX ADMIN — ENOXAPARIN SODIUM 40 MILLIGRAM(S): 100 INJECTION SUBCUTANEOUS at 21:33

## 2024-09-10 RX ADMIN — Medication 250 MILLIGRAM(S): at 21:30

## 2024-09-10 NOTE — PROGRESS NOTE ADULT - SUBJECTIVE AND OBJECTIVE BOX
Subjective:    pat better, lying in bed, no new complaint.    Home Medications:  A &amp; D Ointment: 1 application to right heel as needed after cleaning (02 Sep 2024 22:03)  amLODIPine 5 mg oral tablet: 1 tab(s) orally once a day ,hold for sbp below 100 (02 Sep 2024 22:02)  atorvastatin 20 mg oral tablet: 1 tab(s) orally once a day (at bedtime) (02 Sep 2024 22:03)  clopidogrel 75 mg oral tablet: 1 tab(s) orally once a day (02 Sep 2024 22:02)  gabapentin 100 mg oral capsule: 1 cap(s) orally 2 times a day (02 Sep 2024 22:03)  gabapentin 100 mg oral capsule: 2 cap(s) orally once a day (at bedtime) (02 Sep 2024 22:03)  Melatonin 3 mg oral tablet: 3 tab(s) orally once a day (at bedtime) (02 Sep 2024 22:03)  metoprolol tartrate 25 mg oral tablet: 1 tab(s) orally 2 times a day ,hold for sbp below 100 or hr below 60 (02 Sep 2024 22:02)  Milk of Magnesia 400 mg / 5 mL: Take 30 mL by mouth as needed if no bowel movement x 3 days (02 Sep 2024 22:03)  nystatin 100,000 units/g topical powder: 1 application topically every 8 hours (02 Sep 2024 22:02)  sucralfate 1 g oral tablet: 1 tab(s) orally 2 times a day (02 Sep 2024 22:02)  zinc oxide topical: 1 application to bilateral buttocks three times a day (02 Sep 2024 22:03)    MEDICATIONS  (STANDING):  acetaminophen     Tablet .. 975 milliGRAM(s) Oral every 8 hours  atorvastatin 10 milliGRAM(s) Oral at bedtime  cefuroxime   Tablet 250 milliGRAM(s) Oral every 12 hours  chlorhexidine 4% Liquid 1 Application(s) Topical <User Schedule>  clopidogrel Tablet 75 milliGRAM(s) Oral daily  enoxaparin Injectable 40 milliGRAM(s) SubCutaneous every 24 hours  furosemide   Injectable 20 milliGRAM(s) IV Push daily  gabapentin 100 milliGRAM(s) Oral every 8 hours  metoprolol tartrate 25 milliGRAM(s) Oral two times a day  pantoprazole    Tablet 40 milliGRAM(s) Oral daily  sucralfate 1 Gram(s) Oral two times a day    MEDICATIONS  (PRN):  magnesium hydroxide Suspension 30 milliLiter(s) Oral daily PRN Constipation  ondansetron Injectable 4 milliGRAM(s) IV Push every 6 hours PRN Nausea and/or Vomiting  oxyCODONE    IR 5 milliGRAM(s) Oral every 4 hours PRN Moderate Pain (4 - 6)  oxyCODONE    IR 10 milliGRAM(s) Oral every 4 hours PRN Severe Pain (7 - 10)  polyethylene glycol 3350 17 Gram(s) Oral at bedtime PRN Constipation  senna 2 Tablet(s) Oral at bedtime PRN Constipation  traMADol 50 milliGRAM(s) Oral every 4 hours PRN Mild Pain (1 - 3)      Allergies    No Known Allergies    Intolerances        Vital Signs Last 24 Hrs  T(C): 36.7 (10 Sep 2024 09:00), Max: 36.7 (10 Sep 2024 09:00)  T(F): 98 (10 Sep 2024 09:00), Max: 98 (10 Sep 2024 09:00)  HR: 79 (10 Sep 2024 09:00) (79 - 100)  BP: 123/77 (10 Sep 2024 09:00) (98/66 - 123/77)  BP(mean): --  RR: 18 (10 Sep 2024 09:00) (18 - 18)  SpO2: 95% (10 Sep 2024 09:00) (95% - 98%)    Parameters below as of 10 Sep 2024 09:00  Patient On (Oxygen Delivery Method): room air          PHYSICAL EXAMINATION:    NECK:  Supple. No lymphadenopathy. Jugular venous pressure not elevated. Carotids equal.   HEART:   The cardiac impulse has a normal quality. Reg., Nl S1 and S2.  There are no murmurs, rubs or gallops noted  CHEST:  Chest crackles to auscultation. Normal respiratory effort.  ABDOMEN:  Soft and nontender.   EXTREMITIES:  There is no edema.       LABS:                        8.1    6.81  )-----------( 251      ( 10 Sep 2024 07:11 )             25.4     09-10    142  |  110<H>  |  13  ----------------------------<  96  3.3<L>   |  25  |  0.69    Ca    8.0<L>      10 Sep 2024 07:11        Urinalysis Basic - ( 10 Sep 2024 07:11 )    Color: x / Appearance: x / SG: x / pH: x  Gluc: 96 mg/dL / Ketone: x  / Bili: x / Urobili: x   Blood: x / Protein: x / Nitrite: x   Leuk Esterase: x / RBC: x / WBC x   Sq Epi: x / Non Sq Epi: x / Bacteria: x      US Testicles (09.09.24 @ 09:46) >  IMPRESSION:  Extensive diffuse scrotal edema.    Heterogeneous striated echotexture of both testes. Normal arterial and   venous blood flow bilaterally. Small peripheral somewhat wedge-shaped   hypoechoic area left testis is nonspecific but does not have the typical   appearance of a mass or focal infarct. Consider follow-up sonography in   4-6 weeks for interval evaluation.

## 2024-09-10 NOTE — PROGRESS NOTE ADULT - SUBJECTIVE AND OBJECTIVE BOX
REASON FOR VISIT:  Abn ECG    HPI:  68 year old, man with a history of CVA (residual left hemiplegia), s/p L AKA, CAD s/p CABG, PVD, admitted on 9/2/24 with a R femur fracture; initial operative repair complicated by instability / bleeding and with subsequent episode of SVT.    9/7/24: No cardiac complaints; not experiencing palpitations.    9/8/24: awake alert tele SR ventricular Ectopy Bi/trigeminy occasional triplets ( BB recently up titrated)   9/9/'24: no complaints.   reviewed tele: pvc triplets x2, Sep 8th 730a - AT v. MAT 12 sec, Sep 8th 1710 -  bpm 6 sec   Per sep cardio 4th note - "sustained SVT runs overnight"  9/10/24: Pt awake without complaints of cp of sob. Tele: RSR, PVC's, couplets, triplets overnight    MEDICATIONS:  OUTPATIENT  Home Medications:  A &amp; D Ointment: 1 application to right heel as needed after cleaning (02 Sep 2024 22:03)  amLODIPine 5 mg oral tablet: 1 tab(s) orally once a day ,hold for sbp below 100 (02 Sep 2024 22:02)  atorvastatin 20 mg oral tablet: 1 tab(s) orally once a day (at bedtime) (02 Sep 2024 22:03)  clopidogrel 75 mg oral tablet: 1 tab(s) orally once a day (02 Sep 2024 22:02)  gabapentin 100 mg oral capsule: 1 cap(s) orally 2 times a day (02 Sep 2024 22:03)  gabapentin 100 mg oral capsule: 2 cap(s) orally once a day (at bedtime) (02 Sep 2024 22:03)  Melatonin 3 mg oral tablet: 3 tab(s) orally once a day (at bedtime) (02 Sep 2024 22:03)  metoprolol tartrate 25 mg oral tablet: 1 tab(s) orally 2 times a day ,hold for sbp below 100 or hr below 60 (02 Sep 2024 22:02)  Milk of Magnesia 400 mg / 5 mL: Take 30 mL by mouth as needed if no bowel movement x 3 days (02 Sep 2024 22:03)  nystatin 100,000 units/g topical powder: 1 application topically every 8 hours (02 Sep 2024 22:02)  sucralfate 1 g oral tablet: 1 tab(s) orally 2 times a day (02 Sep 2024 22:02)  zinc oxide topical: 1 application to bilateral buttocks three times a day (02 Sep 2024 22:03)    MEDICATIONS  (STANDING):  acetaminophen     Tablet .. 975 milliGRAM(s) Oral every 8 hours  atorvastatin 10 milliGRAM(s) Oral at bedtime  cefuroxime   Tablet 250 milliGRAM(s) Oral every 12 hours  chlorhexidine 4% Liquid 1 Application(s) Topical <User Schedule>  clopidogrel Tablet 75 milliGRAM(s) Oral daily  enoxaparin Injectable 40 milliGRAM(s) SubCutaneous every 24 hours  furosemide   Injectable 20 milliGRAM(s) IV Push daily  gabapentin 100 milliGRAM(s) Oral every 8 hours  metoprolol tartrate 25 milliGRAM(s) Oral two times a day  pantoprazole    Tablet 40 milliGRAM(s) Oral daily  sucralfate 1 Gram(s) Oral two times a day    MEDICATIONS  (PRN):  magnesium hydroxide Suspension 30 milliLiter(s) Oral daily PRN Constipation  ondansetron Injectable 4 milliGRAM(s) IV Push every 6 hours PRN Nausea and/or Vomiting  oxyCODONE    IR 10 milliGRAM(s) Oral every 4 hours PRN Severe Pain (7 - 10)  oxyCODONE    IR 5 milliGRAM(s) Oral every 4 hours PRN Moderate Pain (4 - 6)  polyethylene glycol 3350 17 Gram(s) Oral at bedtime PRN Constipation  senna 2 Tablet(s) Oral at bedtime PRN Constipation  traMADol 50 milliGRAM(s) Oral every 4 hours PRN Mild Pain (1 - 3)    Vital Signs Last 24 Hrs  T(C): 36.4 (10 Sep 2024 05:20), Max: 36.4 (09 Sep 2024 20:06)  T(F): 97.5 (10 Sep 2024 05:20), Max: 97.5 (09 Sep 2024 20:06)  HR: 89 (10 Sep 2024 05:20) (89 - 100)  BP: 118/71 (10 Sep 2024 05:20) (98/66 - 118/71)  BP(mean): --  RR: 18 (10 Sep 2024 05:20) (18 - 18)  SpO2: 98% (10 Sep 2024 05:20) (96% - 98%)    Parameters below as of 10 Sep 2024 05:20  Patient On (Oxygen Delivery Method): room air    Daily I&O's Summary      PHYSICAL EXAM:  General: Supine in bed, NAD  Cardiac: Normal S1/S2, regular rate and rhythm  Pulmonary: Nonlabored, clear to ausculation  Skin: Warm, dry        ===============================  ===============================  LABS:                           8.1    6.81  )-----------( 251      ( 10 Sep 2024 07:11 )             25.4                           8.2    7.48  )-----------( 189      ( 09 Sep 2024 06:59 )             25.3                         8.5    x     )-----------( x        ( 08 Sep 2024 17:07 )             25.8                         8.3    8.72  )-----------( 150      ( 08 Sep 2024 06:42 )             25.7     09-10    142  |  110<H>  |  13  ----------------------------<  96  3.3<L>   |  25  |  0.69    Ca    8.0<L>      10 Sep 2024 07:11  Mg     1.9     09-08        09 Sep 2024 06:59    140    |  110    |  14     ----------------------------<  87     3.5     |  25     |  0.66   08 Sep 2024 06:42    143    |  114    |  13     ----------------------------<  91     3.6     |  23     |  0.63   07 Sep 2024 06:37    142    |  112    |  16     ----------------------------<  93     3.4     |  23     |  0.72     Ca    7.5        09 Sep 2024 06:59  Ca    7.5        08 Sep 2024 06:42  Ca    7.7        07 Sep 2024 06:37  Mg     1.9       08 Sep 2024 12:05        ===============================  ===============================  CARDIAC BIOMARKERS:  BNP      TROPONIN  Troponin I, High Sensitivity Result: 21.38 ng/L (09-03-24 @ 17:40)  Troponin I, Serum: <.015 ng/mL [.015 - .045] (05-24-19 @ 10:35)  Troponin I, Serum: <.015 ng/mL [.015 - .045] (05-24-19 @ 03:17)  Troponin I, Serum: <.015 ng/mL [.015 - .045] (05-23-19 @ 18:43)    ===============================  ===============================    TTE W or WO Ultrasound Enhancing Agent (09.04.24 @ 12:11) >   1. Left ventricular systolic function is normal with an ejection fraction of 68 % by Marsh's method of disks.   2. Aortic root at the sinuses of Valsalva is dilated, measuring 4.00 cm (indexed 1.59 cm/m²).   Ascending aorta diameter is dilated, measuring 3.90 cm (indexed 1.55 cm/m²).   Aortic arch diameter is dilated, measuring 4.0 cm (indexed 1.59 cm/m²).

## 2024-09-10 NOTE — PROGRESS NOTE ADULT - SUBJECTIVE AND OBJECTIVE BOX
Pt seen and examined at bedside. No acute events overnight. Pain controlled, denies any numbness or tingling. Denies nausea, vomiting, chest pain, or shortness of breath.       LABS:                        8.2    7.48  )-----------( 189      ( 09 Sep 2024 06:59 )             25.3     09-09    140  |  110<H>  |  14  ----------------------------<  87  3.5   |  25  |  0.66    Ca    7.5<L>      09 Sep 2024 06:59  Mg     1.9     09-08        Urinalysis Basic - ( 09 Sep 2024 06:59 )    Color: x / Appearance: x / SG: x / pH: x  Gluc: 87 mg/dL / Ketone: x  / Bili: x / Urobili: x   Blood: x / Protein: x / Nitrite: x   Leuk Esterase: x / RBC: x / WBC x   Sq Epi: x / Non Sq Epi: x / Bacteria: x        VITAL SIGNS:  T(C): 36.4 (09-10-24 @ 05:20), Max: 36.7 (09-09-24 @ 07:29)  HR: 89 (09-10-24 @ 05:20) (76 - 100)  BP: 118/71 (09-10-24 @ 05:20) (98/66 - 121/76)  RR: 18 (09-10-24 @ 05:20) (18 - 18)  SpO2: 98% (09-10-24 @ 05:20) (96% - 98%)      Exam:  Gen: NAD  Right Lower Extremity:  RLE externally rotated and in flexed positioning  Diffuse pitting edema  Dressing clean/dry/intact in ACE  Soft compartments  Negative calf ttp  +EHL/FHL/TA/GSc  SILT L2-S1  DP+       A/P: 68M w/ subtrochanteric fx sp IMN POD 5, doing well, pending discharge    - Weight bearing for transfers, Steve lift to chair allowed only  - FU Uro consult for scrotal swelling  - BCx: NGTD  - BLLE Dopplers negative  - PT/OT  - Follow up AM labs  - Pain control as needed  - DVT ppx per primary  - Encourage IS  - Dispo: SNF per PT, anticipating discharge today  - medical management recommended for diffuse RLE swelling    Will discuss with Dr. Chambers and advise of any changes to the plan.

## 2024-09-10 NOTE — PROGRESS NOTE ADULT - NS ATTEND AMEND GEN_ALL_CORE FT
patient seen with BENNY Jacobson s/p IM nail with post op hypotension  partially related to acute blood loss anemia  bp now improved  hgb stable  restarted on home meds  diet as tolerated  plavix removed  may transfer to floor
I discussed case with SHERLYN Quesada; continue metoprolol; outpatient follow-up with Dr. Winslow
agree with assessment and plan as above
I discussed case with SHERLYN Kearney and reviewed telemetry; stable; management recommendations as described above.
agree with assessment and plan as above

## 2024-09-10 NOTE — PROGRESS NOTE ADULT - SUBJECTIVE AND OBJECTIVE BOX
67yo male with PMHx CVA residual left hemiplegia, contracted LE. s/p L AKA, CAD s/p CABG, PVD, lives in SNF, wheelchair bound.    [place] with [place].  While on hoyar lift getting back into car his R leg was caught and caused excruciating pain causing him to become diaphoretic and vomiting  Found to have  femur fractrue    Downgaded from ICU 9/7. Overnight patient with       Subjective: Patient seen and examined.  services used. episode of SVT on tele and Fever 101.9, was given lopressor and metoprolol PO dose was increased, Patient denies any chest pain or palpitations this AM. UA suspicious for UTI. Will empirically start Rocephin x 3 days.  reports surgical pain is controlled. Hb 8.3. No signs of active bleed. Additional results/Imaging, I have personally reviewed:      PHYSICAL EXAM:  Gen: NAD  HEENT:  pupils equal and reactive, EOMI, no oropharyngeal lesions, erythema, exudates, oral thrush  NECK:   supple, no carotid bruits, no palpable lymph nodes, no thyromegaly  CV:  +S1, +S2, irregular, no murmurs or rubs  RESP:   lungs clear to auscultation bilaterally, no wheezing, rales, rhonchi, good air entry bilaterally  GI:  abdomen firm, non-tender, non-distended, normal BS, no bruits, no abdominal masses, no palpable masses  : condom cath   MSK:   normal muscle tone, no atrophy, no rigidity, no contractions  Hip externally rotated and knee contracted to 90 degrees flexion Dressing C/D/I  EXT:   VASCULAR:  difficult to palpate pulses   NEURO:  AAOX3, no focal neurological deficits, follows all commands, able to move extremities spontaneously  SKIN:  no ulcers, lesions or rashesCULTURES:  Blood, Urine: Negative (09-06 @ 17:50)Telemetry, personally reviewed    labs reviewed no

## 2024-09-10 NOTE — PROGRESS NOTE ADULT - PROBLEM SELECTOR PLAN 1
Frequent PVC's/Bi/Trigeminy, BB titrated.  Occ PVC's, couplet, triplet overnight 9/9  Echo shows NL LV SYS FX EF 68%  Maintain K>4, Mg>2  Continue metoprolol

## 2024-09-10 NOTE — PROGRESS NOTE ADULT - ASSESSMENT
# R femur fracture s/p R Hip IMN complicated OR procedure due to hypotension and hemorrhage s/p 2 u PRBC   -Multi modal pain regiment  -Ortho pedic following  -Incentive spirometer   -PT/OT  -Restart AC in AM if H/h Stable     #Paroxysmal Afib , episodes of SVT  Metoprolol increased to 25 mg BID  Cardiology consulted, recommendations appreciated Continue to replete K    scrotal edema   testicular US iv laisx    urology consult    Hypokalemia  Maintain k>4       #SIRS criteria with fever and tachycardia, leukocytosis   -UA indeterminate  Follow up blood and urine cultures  Will empirically start Ceftriaxone x 3 days US RLE Venous  -1.  No evidence of right lower extremity deep venous thrombosis.  2.  Nonvisualized/nonevaluable infrapopliteal vasculature.      Stable enlarged aortic shadow on XR   CTA Chest -reviewed    #CAD s/p CABG  #PAD  Code status: Full  Diet: regular  DVT ppx possible lovenox if h/h stable   Activity: Bedrest # R femur fracture s/p R Hip IMN complicated OR procedure due to hypotension and hemorrhage s/p 2 u PRBC   -Multi modal pain regiment  -Ortho pedic following  -Incentive spirometer   -PT/OT  -Restart AC in AM if H/h Stable     #, episodes of SVT  Metoprolol increased to 25 mg BID  Cardiology consulted, recommendations appreciated Continue to replete K    scrotal edema   testicular US iv laisx    urology consult    Hypokalemia  Maintain k>4       #SIRS criteria with fever and tachycardia, leukocytosis   -UA indeterminate  Follow up blood and urine cultures  Will empirically start Ceftriaxone x 3 days US RLE Venous  -1.  No evidence of right lower extremity deep venous thrombosis.  2.  Nonvisualized/nonevaluable infrapopliteal vasculature.      Stable enlarged aortic shadow on XR   CTA Chest -reviewed    #CAD s/p CABG  #PAD  Code status: Full  Diet: regular  DVT ppx possible lovenox if h/h stable   Activity: Bedrest

## 2024-09-11 ENCOUNTER — TRANSCRIPTION ENCOUNTER (OUTPATIENT)
Age: 69
End: 2024-09-11

## 2024-09-11 VITALS
HEART RATE: 82 BPM | RESPIRATION RATE: 18 BRPM | DIASTOLIC BLOOD PRESSURE: 80 MMHG | TEMPERATURE: 99 F | OXYGEN SATURATION: 98 % | SYSTOLIC BLOOD PRESSURE: 108 MMHG

## 2024-09-11 LAB
ANION GAP SERPL CALC-SCNC: 6 MMOL/L — SIGNIFICANT CHANGE UP (ref 5–17)
BASOPHILS # BLD AUTO: 0.08 K/UL — SIGNIFICANT CHANGE UP (ref 0–0.2)
BASOPHILS NFR BLD AUTO: 1 % — SIGNIFICANT CHANGE UP (ref 0–2)
BUN SERPL-MCNC: 15 MG/DL — SIGNIFICANT CHANGE UP (ref 7–23)
CALCIUM SERPL-MCNC: 8.5 MG/DL — SIGNIFICANT CHANGE UP (ref 8.5–10.1)
CHLORIDE SERPL-SCNC: 108 MMOL/L — SIGNIFICANT CHANGE UP (ref 96–108)
CO2 SERPL-SCNC: 26 MMOL/L — SIGNIFICANT CHANGE UP (ref 22–31)
CREAT SERPL-MCNC: 0.76 MG/DL — SIGNIFICANT CHANGE UP (ref 0.5–1.3)
EGFR: 97 ML/MIN/1.73M2 — SIGNIFICANT CHANGE UP
EOSINOPHIL # BLD AUTO: 0.36 K/UL — SIGNIFICANT CHANGE UP (ref 0–0.5)
EOSINOPHIL NFR BLD AUTO: 4.6 % — SIGNIFICANT CHANGE UP (ref 0–6)
GLUCOSE SERPL-MCNC: 88 MG/DL — SIGNIFICANT CHANGE UP (ref 70–99)
HCT VFR BLD CALC: 27.8 % — LOW (ref 39–50)
HGB BLD-MCNC: 8.8 G/DL — LOW (ref 13–17)
IMM GRANULOCYTES NFR BLD AUTO: 1 % — HIGH (ref 0–0.9)
LYMPHOCYTES # BLD AUTO: 2.01 K/UL — SIGNIFICANT CHANGE UP (ref 1–3.3)
LYMPHOCYTES # BLD AUTO: 25.7 % — SIGNIFICANT CHANGE UP (ref 13–44)
MCHC RBC-ENTMCNC: 30.1 PG — SIGNIFICANT CHANGE UP (ref 27–34)
MCHC RBC-ENTMCNC: 31.7 GM/DL — LOW (ref 32–36)
MCV RBC AUTO: 95.2 FL — SIGNIFICANT CHANGE UP (ref 80–100)
MONOCYTES # BLD AUTO: 0.5 K/UL — SIGNIFICANT CHANGE UP (ref 0–0.9)
MONOCYTES NFR BLD AUTO: 6.4 % — SIGNIFICANT CHANGE UP (ref 2–14)
NEUTROPHILS # BLD AUTO: 4.78 K/UL — SIGNIFICANT CHANGE UP (ref 1.8–7.4)
NEUTROPHILS NFR BLD AUTO: 61.3 % — SIGNIFICANT CHANGE UP (ref 43–77)
PLATELET # BLD AUTO: 327 K/UL — SIGNIFICANT CHANGE UP (ref 150–400)
POTASSIUM SERPL-MCNC: 3.6 MMOL/L — SIGNIFICANT CHANGE UP (ref 3.5–5.3)
POTASSIUM SERPL-SCNC: 3.6 MMOL/L — SIGNIFICANT CHANGE UP (ref 3.5–5.3)
RBC # BLD: 2.92 M/UL — LOW (ref 4.2–5.8)
RBC # FLD: 15.5 % — HIGH (ref 10.3–14.5)
SODIUM SERPL-SCNC: 140 MMOL/L — SIGNIFICANT CHANGE UP (ref 135–145)
WBC # BLD: 7.81 K/UL — SIGNIFICANT CHANGE UP (ref 3.8–10.5)
WBC # FLD AUTO: 7.81 K/UL — SIGNIFICANT CHANGE UP (ref 3.8–10.5)

## 2024-09-11 PROCEDURE — 99239 HOSP IP/OBS DSCHRG MGMT >30: CPT

## 2024-09-11 PROCEDURE — 99232 SBSQ HOSP IP/OBS MODERATE 35: CPT

## 2024-09-11 PROCEDURE — 99221 1ST HOSP IP/OBS SF/LOW 40: CPT

## 2024-09-11 RX ORDER — POTASSIUM CHLORIDE 10 MEQ
1 TABLET, EXT RELEASE, PARTICLES/CRYSTALS ORAL
Qty: 0 | Refills: 0 | DISCHARGE

## 2024-09-11 RX ORDER — ENOXAPARIN SODIUM 100 MG/ML
40 INJECTION SUBCUTANEOUS
Qty: 0 | Refills: 0 | DISCHARGE
Start: 2024-09-11

## 2024-09-11 RX ORDER — POLYETHYLENE GLYCOL 3350 17 G/17G
17 POWDER, FOR SOLUTION ORAL
Qty: 0 | Refills: 0 | DISCHARGE
Start: 2024-09-11

## 2024-09-11 RX ORDER — FUROSEMIDE 40 MG
1 TABLET ORAL
Qty: 0 | Refills: 0 | DISCHARGE

## 2024-09-11 RX ORDER — SENNA 187 MG
2 TABLET ORAL
Qty: 0 | Refills: 0 | DISCHARGE
Start: 2024-09-11

## 2024-09-11 RX ADMIN — Medication 75 MILLIGRAM(S): at 09:27

## 2024-09-11 RX ADMIN — SUCRALFATE 1 GRAM(S): 1 SUSPENSION ORAL at 09:27

## 2024-09-11 RX ADMIN — ACETAMINOPHEN 975 MILLIGRAM(S): 325 TABLET ORAL at 05:16

## 2024-09-11 RX ADMIN — Medication 40 MILLIGRAM(S): at 09:27

## 2024-09-11 RX ADMIN — Medication 100 MILLIGRAM(S): at 05:16

## 2024-09-11 RX ADMIN — Medication 250 MILLIGRAM(S): at 09:27

## 2024-09-11 RX ADMIN — ACETAMINOPHEN 975 MILLIGRAM(S): 325 TABLET ORAL at 13:47

## 2024-09-11 RX ADMIN — Medication 20 MILLIGRAM(S): at 09:27

## 2024-09-11 RX ADMIN — Medication 100 MILLIGRAM(S): at 13:46

## 2024-09-11 RX ADMIN — METOPROLOL TARTRATE 25 MILLIGRAM(S): 100 TABLET ORAL at 05:17

## 2024-09-11 NOTE — PROGRESS NOTE ADULT - SUBJECTIVE AND OBJECTIVE BOX
REASON FOR VISIT:  Abn ECG    HPI:  68 year old, man with a history of CVA (residual left hemiplegia), s/p L AKA, CAD s/p CABG, PVD, admitted on 9/2/24 with a R femur fracture; initial operative repair complicated by instability / bleeding and with subsequent episode of SVT.    9/7/24: No cardiac complaints; not experiencing palpitations.    9/8/24: awake alert tele SR ventricular Ectopy Bi/trigeminy occasional triplets ( BB recently up titrated)   9/9/'24: no complaints.   reviewed tele: pvc triplets x2, Sep 8th 730a - AT v. MAT 12 sec, Sep 8th 1710 -  bpm 6 sec   Per sep cardio 4th note - "sustained SVT runs overnight"  9/10/24: Pt awake without complaints of cp of sob. Tele: RSR, PVC's, couplets, triplets overnight    MEDICATIONS:  OUTPATIENT  Home Medications:  A &amp; D Ointment: 1 application to right heel as needed after cleaning (02 Sep 2024 22:03)  amLODIPine 5 mg oral tablet: 1 tab(s) orally once a day ,hold for sbp below 100 (02 Sep 2024 22:02)  atorvastatin 20 mg oral tablet: 1 tab(s) orally once a day (at bedtime) (02 Sep 2024 22:03)  clopidogrel 75 mg oral tablet: 1 tab(s) orally once a day (02 Sep 2024 22:02)  gabapentin 100 mg oral capsule: 1 cap(s) orally 2 times a day (02 Sep 2024 22:03)  gabapentin 100 mg oral capsule: 2 cap(s) orally once a day (at bedtime) (02 Sep 2024 22:03)  Melatonin 3 mg oral tablet: 3 tab(s) orally once a day (at bedtime) (02 Sep 2024 22:03)  metoprolol tartrate 25 mg oral tablet: 1 tab(s) orally 2 times a day ,hold for sbp below 100 or hr below 60 (02 Sep 2024 22:02)  Milk of Magnesia 400 mg / 5 mL: Take 30 mL by mouth as needed if no bowel movement x 3 days (02 Sep 2024 22:03)  nystatin 100,000 units/g topical powder: 1 application topically every 8 hours (02 Sep 2024 22:02)  sucralfate 1 g oral tablet: 1 tab(s) orally 2 times a day (02 Sep 2024 22:02)  zinc oxide topical: 1 application to bilateral buttocks three times a day (02 Sep 2024 22:03)    MEDICATIONS  (STANDING):  acetaminophen     Tablet .. 975 milliGRAM(s) Oral every 8 hours  atorvastatin 10 milliGRAM(s) Oral at bedtime  cefuroxime   Tablet 250 milliGRAM(s) Oral every 12 hours  chlorhexidine 4% Liquid 1 Application(s) Topical <User Schedule>  clopidogrel Tablet 75 milliGRAM(s) Oral daily  enoxaparin Injectable 40 milliGRAM(s) SubCutaneous every 24 hours  furosemide   Injectable 20 milliGRAM(s) IV Push daily  gabapentin 100 milliGRAM(s) Oral every 8 hours  metoprolol tartrate 25 milliGRAM(s) Oral two times a day  pantoprazole    Tablet 40 milliGRAM(s) Oral daily  sucralfate 1 Gram(s) Oral two times a day    MEDICATIONS  (PRN):  magnesium hydroxide Suspension 30 milliLiter(s) Oral daily PRN Constipation  ondansetron Injectable 4 milliGRAM(s) IV Push every 6 hours PRN Nausea and/or Vomiting  oxyCODONE    IR 10 milliGRAM(s) Oral every 4 hours PRN Severe Pain (7 - 10)  oxyCODONE    IR 5 milliGRAM(s) Oral every 4 hours PRN Moderate Pain (4 - 6)  polyethylene glycol 3350 17 Gram(s) Oral at bedtime PRN Constipation  senna 2 Tablet(s) Oral at bedtime PRN Constipation  traMADol 50 milliGRAM(s) Oral every 4 hours PRN Mild Pain (1 - 3)    Vital Signs Last 24 Hrs  T(C): 37 (11 Sep 2024 09:00), Max: 37 (11 Sep 2024 09:00)  T(F): 98.6 (11 Sep 2024 09:00), Max: 98.6 (11 Sep 2024 09:00)  HR: 82 (11 Sep 2024 09:00) (77 - 88)  BP: 108/80 (11 Sep 2024 09:00) (107/94 - 139/91)  BP(mean): --  RR: 18 (11 Sep 2024 09:00) (18 - 18)  SpO2: 98% (11 Sep 2024 09:00) (93% - 98%)    Parameters below as of 11 Sep 2024 09:00  Patient On (Oxygen Delivery Method): room air      PHYSICAL EXAM:  General: Supine in bed, NAD  Cardiac: Normal S1/S2, regular rate and rhythm  Pulmonary: Nonlabored, clear to ausculation  Extremity: RLE +1 pitting edema, LLE AKA  Skin: Warm, dry        ===============================  ===============================  LABS:                         8.8    7.81  )-----------( 327      ( 11 Sep 2024 07:45 )             27.8     09-11    140  |  108  |  15  ----------------------------<  88  3.6   |  26  |  0.76    Ca    8.5      11 Sep 2024 07:45    ===============================  ===============================  CARDIAC BIOMARKERS:  BNP      TROPONIN  Troponin I, High Sensitivity Result: 21.38 ng/L (09-03-24 @ 17:40)  Troponin I, Serum: <.015 ng/mL [.015 - .045] (05-24-19 @ 10:35)  Troponin I, Serum: <.015 ng/mL [.015 - .045] (05-24-19 @ 03:17)  Troponin I, Serum: <.015 ng/mL [.015 - .045] (05-23-19 @ 18:43)    ===============================  ===============================    TTE W or WO Ultrasound Enhancing Agent (09.04.24 @ 12:11) >   1. Left ventricular systolic function is normal with an ejection fraction of 68 % by Marsh's method of disks.   2. Aortic root at the sinuses of Valsalva is dilated, measuring 4.00 cm (indexed 1.59 cm/m²).   Ascending aorta diameter is dilated, measuring 3.90 cm (indexed 1.55 cm/m²).   Aortic arch diameter is dilated, measuring 4.0 cm (indexed 1.59 cm/m²).       REASON FOR VISIT:  Abn ECG    HPI:  68 year old, man with a history of CVA (residual left hemiplegia), s/p L AKA, CAD s/p CABG, PVD, admitted on 9/2/24 with a R femur fracture; initial operative repair complicated by instability / bleeding and with subsequent episode of SVT.    9/7/24: No cardiac complaints; not experiencing palpitations.    9/8/24: awake alert tele SR ventricular Ectopy Bi/trigeminy occasional triplets ( BB recently up titrated)   9/9/'24: no complaints.   reviewed tele: pvc triplets x2, Sep 8th 730a - AT v. MAT 12 sec, Sep 8th 1710 -  bpm 6 sec   Per sep cardio 4th note - "sustained SVT runs overnight"  9/10/24: Pt awake without complaints of cp of sob. Tele: RSR, PVC's, couplets, triplets overnight    9/11/24: Pt found laying in bed, awake and alert. No complaints of chest pain or SOB.    MEDICATIONS:  OUTPATIENT  Home Medications:  A &amp; D Ointment: 1 application to right heel as needed after cleaning (02 Sep 2024 22:03)  amLODIPine 5 mg oral tablet: 1 tab(s) orally once a day ,hold for sbp below 100 (02 Sep 2024 22:02)  atorvastatin 20 mg oral tablet: 1 tab(s) orally once a day (at bedtime) (02 Sep 2024 22:03)  clopidogrel 75 mg oral tablet: 1 tab(s) orally once a day (02 Sep 2024 22:02)  gabapentin 100 mg oral capsule: 1 cap(s) orally 2 times a day (02 Sep 2024 22:03)  gabapentin 100 mg oral capsule: 2 cap(s) orally once a day (at bedtime) (02 Sep 2024 22:03)  Melatonin 3 mg oral tablet: 3 tab(s) orally once a day (at bedtime) (02 Sep 2024 22:03)  metoprolol tartrate 25 mg oral tablet: 1 tab(s) orally 2 times a day ,hold for sbp below 100 or hr below 60 (02 Sep 2024 22:02)  Milk of Magnesia 400 mg / 5 mL: Take 30 mL by mouth as needed if no bowel movement x 3 days (02 Sep 2024 22:03)  nystatin 100,000 units/g topical powder: 1 application topically every 8 hours (02 Sep 2024 22:02)  sucralfate 1 g oral tablet: 1 tab(s) orally 2 times a day (02 Sep 2024 22:02)  zinc oxide topical: 1 application to bilateral buttocks three times a day (02 Sep 2024 22:03)    MEDICATIONS  (STANDING):  acetaminophen     Tablet .. 975 milliGRAM(s) Oral every 8 hours  atorvastatin 10 milliGRAM(s) Oral at bedtime  cefuroxime   Tablet 250 milliGRAM(s) Oral every 12 hours  chlorhexidine 4% Liquid 1 Application(s) Topical <User Schedule>  clopidogrel Tablet 75 milliGRAM(s) Oral daily  enoxaparin Injectable 40 milliGRAM(s) SubCutaneous every 24 hours  furosemide   Injectable 20 milliGRAM(s) IV Push daily  gabapentin 100 milliGRAM(s) Oral every 8 hours  metoprolol tartrate 25 milliGRAM(s) Oral two times a day  pantoprazole    Tablet 40 milliGRAM(s) Oral daily  sucralfate 1 Gram(s) Oral two times a day    MEDICATIONS  (PRN):  magnesium hydroxide Suspension 30 milliLiter(s) Oral daily PRN Constipation  ondansetron Injectable 4 milliGRAM(s) IV Push every 6 hours PRN Nausea and/or Vomiting  oxyCODONE    IR 10 milliGRAM(s) Oral every 4 hours PRN Severe Pain (7 - 10)  oxyCODONE    IR 5 milliGRAM(s) Oral every 4 hours PRN Moderate Pain (4 - 6)  polyethylene glycol 3350 17 Gram(s) Oral at bedtime PRN Constipation  senna 2 Tablet(s) Oral at bedtime PRN Constipation  traMADol 50 milliGRAM(s) Oral every 4 hours PRN Mild Pain (1 - 3)    Vital Signs Last 24 Hrs  T(C): 37 (11 Sep 2024 09:00), Max: 37 (11 Sep 2024 09:00)  T(F): 98.6 (11 Sep 2024 09:00), Max: 98.6 (11 Sep 2024 09:00)  HR: 82 (11 Sep 2024 09:00) (77 - 88)  BP: 108/80 (11 Sep 2024 09:00) (107/94 - 139/91)  BP(mean): --  RR: 18 (11 Sep 2024 09:00) (18 - 18)  SpO2: 98% (11 Sep 2024 09:00) (93% - 98%)    Parameters below as of 11 Sep 2024 09:00  Patient On (Oxygen Delivery Method): room air      PHYSICAL EXAM:  General: Supine in bed, NAD  Cardiac: Normal S1/S2, regular rate and rhythm  Pulmonary: Nonlabored, clear to ausculation  Extremity: RLE +1 pitting edema, LLE AKA  Skin: Warm, dry        ===============================  ===============================  LABS:                         8.8    7.81  )-----------( 327      ( 11 Sep 2024 07:45 )             27.8     09-11    140  |  108  |  15  ----------------------------<  88  3.6   |  26  |  0.76    Ca    8.5      11 Sep 2024 07:45    ===============================  ===============================  CARDIAC BIOMARKERS:  BNP      TROPONIN  Troponin I, High Sensitivity Result: 21.38 ng/L (09-03-24 @ 17:40)  Troponin I, Serum: <.015 ng/mL [.015 - .045] (05-24-19 @ 10:35)  Troponin I, Serum: <.015 ng/mL [.015 - .045] (05-24-19 @ 03:17)  Troponin I, Serum: <.015 ng/mL [.015 - .045] (05-23-19 @ 18:43)    ===============================  ===============================    TTE W or WO Ultrasound Enhancing Agent (09.04.24 @ 12:11) >   1. Left ventricular systolic function is normal with an ejection fraction of 68 % by Marsh's method of disks.   2. Aortic root at the sinuses of Valsalva is dilated, measuring 4.00 cm (indexed 1.59 cm/m²).   Ascending aorta diameter is dilated, measuring 3.90 cm (indexed 1.55 cm/m²).   Aortic arch diameter is dilated, measuring 4.0 cm (indexed 1.59 cm/m²).

## 2024-09-11 NOTE — PROGRESS NOTE ADULT - PROVIDER SPECIALTY LIST ADULT
Cardiology
Orthopedics
SICU
Cardiology
Critical Care
Hospitalist
Orthopedics
Pulmonology
Pulmonology
SICU
Cardiology
Hospitalist
Orthopedics
SICU
Vascular Surgery
Hospitalist
Pulmonology
Vascular Surgery
Cardiology

## 2024-09-11 NOTE — PROGRESS NOTE ADULT - ASSESSMENT
PROBLEMS:    Mild pulmonary interstitial edema and trace right pleural effusion.  R femur fracture s/p R Hip IMN complicated OR procedure due to hypotension and hemorrhage s/p 2 u PRBC Paroxysmal Afib , episodes of SVT  CAD s/p CABG  PAD    PLAN:    Pulmonary stable-no acute issues  Pain control  Incentive spirometer   PT/OT  AC restarted  SIRS criteria with fever and tachycardia, leukocytosis- po ceftin   DVT ppx lovenox 40mg daily  PROBLEMS:    Mild pulmonary interstitial edema and trace right pleural effusion.  R femur fracture s/p R Hip IMN complicated OR procedure due to hypotension and hemorrhage s/p 2 u PRBC  Episodes of SVT- no evidence of Paroxysmal Afib  PAD    PLAN:    Pulmonary stable-no acute issues-decd planning  Pain control  Incentive spirometer   PT/OT  AC restarted  SIRS criteria with fever and tachycardia, leukocytosis- po ceftin   DVT ppx lovenox 40mg daily

## 2024-09-11 NOTE — PROGRESS NOTE ADULT - PROBLEM SELECTOR PROBLEM 2
CAD (coronary artery disease)
Preoperative cardiovascular examination
Preoperative cardiovascular examination

## 2024-09-11 NOTE — PROGRESS NOTE ADULT - SUBJECTIVE AND OBJECTIVE BOX
Pt seen and examined at bedside. No acute events overnight. Pain controlled, denies any numbness or tingling. Denies nausea, vomiting, chest pain, or shortness of breath.     LABS:                        8.1    6.81  )-----------( 251      ( 10 Sep 2024 07:11 )             25.4     09-10    142  |  110<H>  |  13  ----------------------------<  96  3.3<L>   |  25  |  0.69    Ca    8.0<L>      10 Sep 2024 07:11        Urinalysis Basic - ( 10 Sep 2024 07:11 )    Color: x / Appearance: x / SG: x / pH: x  Gluc: 96 mg/dL / Ketone: x  / Bili: x / Urobili: x   Blood: x / Protein: x / Nitrite: x   Leuk Esterase: x / RBC: x / WBC x   Sq Epi: x / Non Sq Epi: x / Bacteria: x        VITAL SIGNS:  T(C): 36.9 (09-10-24 @ 21:03), Max: 36.9 (09-10-24 @ 17:32)  HR: 77 (09-10-24 @ 21:03) (77 - 88)  BP: 107/94 (09-10-24 @ 21:03) (107/94 - 139/91)  RR: 18 (09-10-24 @ 21:03) (18 - 18)  SpO2: 97% (09-10-24 @ 21:03) (93% - 97%)        Exam:  Gen: NAD  Right Lower Extremity:  RLE externally rotated and in flexed positioning  Diffuse pitting edema  Dressing clean/dry/intact in ACE  Soft compartments  Negative calf ttp  +EHL/FHL/TA/GSc  SILT L2-S1  DP+       A/P: 68M w/ subtrochanteric fx sp IMN POD 6, doing well, pending discharge    - Weight bearing for transfers, Steve lift to chair allowed only  - FU Uro consult for scrotal swelling  - BCx: NGTD  - BLLE Dopplers negative  - PT/OT  - Follow up AM labs  - Pain control as needed  - DVT ppx per primary  - Encourage IS  - Dispo: SNF per PT, anticipating discharge today  - medical management recommended for diffuse RLE swelling    Will discuss with Dr. Chambers and advise of any changes to the plan.

## 2024-09-11 NOTE — DISCHARGE NOTE NURSING/CASE MANAGEMENT/SOCIAL WORK - NSDCVIVACCINE_GEN_ALL_CORE_FT
influenza, injectable, quadrivalent, preservative free; 26-Feb-2019 12:13; Susan Rashid (TRA); [employer]; [id number] (Exp. Date: 30-Jun-2019); IntraMuscular; Deltoid Left.; 0.5 milliLiter(s); VIS (VIS Published: 07-Aug-2015, VIS Presented: 26-Feb-2019);

## 2024-09-11 NOTE — PROGRESS NOTE ADULT - ASSESSMENT
68 year old, man with a history of CVA (residual left hemiplegia), s/p L AKA, CAD s/p CABG, PVD, admitted on 9/2/24 with a R femur fracture; initial operative repair complicated by instability / bleeding and with subsequent episode of SVT.

## 2024-09-11 NOTE — PROGRESS NOTE ADULT - PROBLEM SELECTOR PLAN 1
Frequent PVC's/Bi/Trigeminy, BB titrated.  Occ PVC's, couplet, triplet overnight 9/9  Echo shows NL LV SYS FX EF 68%  Maintain K>4, Mg>2  Continue metoprolol Frequent PVC's/Bi/Trigeminy, BB titrated.  Occ PVC's, couplet, triplet overnight 9/9  Echo shows NL LV SYS FX EF 68%  Maintain K>4, Mg>2  Continue metoprolol, monitor

## 2024-09-11 NOTE — DISCHARGE NOTE NURSING/CASE MANAGEMENT/SOCIAL WORK - NSDCPELOVENOXFU_GEN_ALL_CORE
All labs came back normal.  No new recommendations for now, has appointment with me 3/1  
Please advise     
Please call patient to discuss results of recent labs  Tasked to nursing   
Spoke to patient, relayed MD message. Verbalized understanding.   
Go for blood tests as directed. Your doctor will do lab tests at regular visits to monitor the effects of this medicine. Please follow up with your doctor and keep your health care provider appointments
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
## 2024-09-11 NOTE — PROGRESS NOTE ADULT - SUBJECTIVE AND OBJECTIVE BOX
Subjective:    pat  better, lying in bed, no new complaint, RA 98%.    Home Medications:  A &amp; D Ointment: 1 application to right heel as needed after cleaning (02 Sep 2024 22:03)  amLODIPine 5 mg oral tablet: 1 tab(s) orally once a day ,hold for sbp below 100 (02 Sep 2024 22:02)  atorvastatin 20 mg oral tablet: 1 tab(s) orally once a day (at bedtime) (02 Sep 2024 22:03)  clopidogrel 75 mg oral tablet: 1 tab(s) orally once a day (02 Sep 2024 22:02)  gabapentin 100 mg oral capsule: 1 cap(s) orally 2 times a day (02 Sep 2024 22:03)  gabapentin 100 mg oral capsule: 2 cap(s) orally once a day (at bedtime) (02 Sep 2024 22:03)  Melatonin 3 mg oral tablet: 3 tab(s) orally once a day (at bedtime) (02 Sep 2024 22:03)  metoprolol tartrate 25 mg oral tablet: 1 tab(s) orally 2 times a day ,hold for sbp below 100 or hr below 60 (02 Sep 2024 22:02)  Milk of Magnesia 400 mg / 5 mL: Take 30 mL by mouth as needed if no bowel movement x 3 days (02 Sep 2024 22:03)  nystatin 100,000 units/g topical powder: 1 application topically every 8 hours (02 Sep 2024 22:02)  sucralfate 1 g oral tablet: 1 tab(s) orally 2 times a day (02 Sep 2024 22:02)  zinc oxide topical: 1 application to bilateral buttocks three times a day (02 Sep 2024 22:03)    MEDICATIONS  (STANDING):  acetaminophen     Tablet .. 975 milliGRAM(s) Oral every 8 hours  atorvastatin 10 milliGRAM(s) Oral at bedtime  chlorhexidine 4% Liquid 1 Application(s) Topical <User Schedule>  clopidogrel Tablet 75 milliGRAM(s) Oral daily  enoxaparin Injectable 40 milliGRAM(s) SubCutaneous every 24 hours  furosemide   Injectable 20 milliGRAM(s) IV Push daily  gabapentin 100 milliGRAM(s) Oral every 8 hours  metoprolol tartrate 25 milliGRAM(s) Oral two times a day  pantoprazole    Tablet 40 milliGRAM(s) Oral daily  sucralfate 1 Gram(s) Oral two times a day    MEDICATIONS  (PRN):  magnesium hydroxide Suspension 30 milliLiter(s) Oral daily PRN Constipation  ondansetron Injectable 4 milliGRAM(s) IV Push every 6 hours PRN Nausea and/or Vomiting  polyethylene glycol 3350 17 Gram(s) Oral at bedtime PRN Constipation  senna 2 Tablet(s) Oral at bedtime PRN Constipation      Allergies    No Known Allergies    Intolerances        Vital Signs Last 24 Hrs  T(C): 37 (11 Sep 2024 09:00), Max: 37 (11 Sep 2024 09:00)  T(F): 98.6 (11 Sep 2024 09:00), Max: 98.6 (11 Sep 2024 09:00)  HR: 82 (11 Sep 2024 09:00) (77 - 88)  BP: 108/80 (11 Sep 2024 09:00) (107/94 - 139/91)  BP(mean): --  RR: 18 (11 Sep 2024 09:00) (18 - 18)  SpO2: 98% (11 Sep 2024 09:00) (93% - 98%)    Parameters below as of 11 Sep 2024 09:00  Patient On (Oxygen Delivery Method): room air          PHYSICAL EXAMINATION:    NECK:  Supple. No lymphadenopathy. Jugular venous pressure not elevated. Carotids equal.   HEART:   The cardiac impulse has a normal quality. Reg., Nl S1 and S2.  There are no murmurs, rubs or gallops noted  CHEST:  Chest crackles to auscultation. Normal respiratory effort.  ABDOMEN:  Soft and nontender.   EXTREMITIES:  There is no edema.       LABS:                        8.8    7.81  )-----------( 327      ( 11 Sep 2024 07:45 )             27.8     09-11    140  |  108  |  15  ----------------------------<  88  3.6   |  26  |  0.76    Ca    8.5      11 Sep 2024 07:45        Urinalysis Basic - ( 11 Sep 2024 07:45 )    Color: x / Appearance: x / SG: x / pH: x  Gluc: 88 mg/dL / Ketone: x  / Bili: x / Urobili: x   Blood: x / Protein: x / Nitrite: x   Leuk Esterase: x / RBC: x / WBC x   Sq Epi: x / Non Sq Epi: x / Bacteria: x

## 2024-09-11 NOTE — DISCHARGE NOTE NURSING/CASE MANAGEMENT/SOCIAL WORK - PATIENT PORTAL LINK FT
You can access the FollowMyHealth Patient Portal offered by [facility] by registering at the following website: [email or web address]. By joining [employer]’s FollowMyHealth portal, you will also be able to view your health information using other applications (apps) compatible with our system.

## 2024-09-11 NOTE — CONSULT NOTE ADULT - ASSESSMENT
A/P:  67 y/o male with dependent edema    Encouraged staff to place rolled up towel underneath scrotum when pt in bed or sitting in chair to allow pt to absorb edema and decrease scrotal/penile swelling  Above discussed with Dr. Messer  
PROBLEMS:    Mild pulmonary interstitial edema and trace right pleural effusion.  R femur fracture s/p R Hip IMN complicated OR procedure due to hypotension and hemorrhage s/p 2 u PRBC Paroxysmal Afib , episodes of SVT  CAD s/p CABG  PAD    PLAN:    pulmonary stable, no acute issues  Pain control  Incentive spirometer   PT/OT  Restart AC if H/h Stable   SIRS criteria with fever and tachycardia, leukocytosis- iv Ceftriaxone x 3 days   DVT ppx possible lovenox if h/h stable   
68M with hx of PAD s/p R femur IMN complicated by intraoperative instability   vascular surgery called to evaluate pulse changes postoperatively    recommend obtaining CTA of RLE to evaluate for vascular injury vs occlusion vs thrombosis  vascular surgery will follow
67 yo male PMH stroke with residual Left hemiplegia, contracted LUE, CAD, PAD, CABG lives in SNF, wheelchair bound, initially presented after accident involving a [place] lift, + Right femur fracture. Today pt was to have fracture repaired in OR. Surgical staff found him to be too contracted and surgery needed to be cancelled, per anesthesia pt lost 800cc blood, UPON arrival in PACU was hypotensive and tachycardic, upon waking from anesthesia pt was altered, not as baseline mentally. CODE STROKE was activated. NIHSS 11 mainly for mental status/higher function change.  No gross focality suggestive of acute stroke identified.    #Acute encephalopathy most likely sequelae of hypotension in the setting of post anesthesia, less likely stroke,   - No IV TNK. CT head done shows no acute findings    To OR for placement of trochanteric nail into right hip, procedure aborted 2/2 patient instability    #hypotension, likely combination of ABLA, anesthesia, hypovolemia  BP improved with IVF  PRBC x 2 in OR  trend H/H  tx to SICU

## 2024-09-11 NOTE — DISCHARGE NOTE NURSING/CASE MANAGEMENT/SOCIAL WORK - NSTRANSFERBELONGINGSDISPO_GEN_A_NUR
Pt. is a 1 yo female c/o cough x 2-3  days. Pt. is alert. No sob, no difficulty breathing. with patient

## 2024-09-11 NOTE — PROGRESS NOTE ADULT - REASON FOR ADMISSION
hip fracture

## 2024-09-11 NOTE — PROGRESS NOTE ADULT - PROBLEM SELECTOR PROBLEM 1
SVT (supraventricular tachycardia)

## 2024-09-11 NOTE — DISCHARGE NOTE NURSING/CASE MANAGEMENT/SOCIAL WORK - NSDCPEFALRISK_GEN_ALL_CORE
For information on Fall & Injury Prevention, visit: [email or web address] OR  [email or web address] OR  https://www.cdc.gov/steadi/patient.html

## 2024-09-11 NOTE — CONSULT NOTE ADULT - CONSULT REASON
Consulted at 1845  Seen at 1900
r/o vascular injury RLE
pleural effusion
Scrotal edema
evaluation for SICU admission of pt with hypotension and AMS s/p ortho R hip sx
preop CV

## 2024-09-12 LAB
CULTURE RESULTS: SIGNIFICANT CHANGE UP
SPECIMEN SOURCE: SIGNIFICANT CHANGE UP

## 2024-09-19 NOTE — CDI QUERY NOTE - NSCDIOTHERTXTBX_GEN_ALL_CORE_HH
Clinical documentation and/or evidence of the patient’s presentation, evaluation, and medical management, as evidenced below, may support a diagnosis that is not documented in the medical record.  In order to ensure accurate coding and accuracy of the clinical record, the documentation in this patient’s medical record requires additional clarification.      If you think the supporting documentation and/or clinical evidence supports a more specific diagnosis, please include more specific documentation of a diagnosis associated with these findings in your progress note and/or discharge summary.    Please clarify this patient’s perfusion status:      - Hemorrhagic Shock, due to surgery  - Hemorrhagic Shock, due to trauma  - Hypotension without shock  - Hypovolemic Shock  - Other (specify)    Supporting documentation and/or clinical evidence:     Critical care consult 9/3/2024   Pt hypotensive, EBL in OR 800cc, given PRBC x 2.   BP: 86/63 (09-03-24 @ 18:10) (86/63 - 132/90)  ABP: 99/55 (09-03-24 @ 18:10) (86/58 - 108/58)    Adult-Critical care progress note 9/4/2024   OR for placement of trochanteric nail into right hip, procedure aborted 2/2 patient instability  hypotension, likely combination of ABLA, anesthesia, hypovolemia  BP improved with IVF  PRBC x 2 in OR  BP: 98/72 (04 Sep 2024 06:00) (78/61 - 107/86)  BP(mean): 81 (04 Sep 2024 06:00) (68 - 94)  ABP: 102/60 (04 Sep 2024 06:00) (75/53 - 132/72)  ABP(mean): 73 (04 Sep 2024 06:00) (62 - 94)    SICU Progress note 9/5/2024   Taken to the OR on 9/3 for placement of R trochanteric nail into R hip however procedure aborted due to patient instability and bleeding  EBL 800cc, received 2u pRBC intra op  Had episode of SVT overnight 200s and hypotension. resolved with IV lopressor 5mg x 1 and IV fluids.   BP still soft but improving with blood transfusion  BP: 80/58 (05 Sep 2024 10:37) (78/63 - 99/67)  BP(mean): 67 (05 Sep 2024 10:37) (62 - 81)  cont NS @ 100 while NPO monitor I & Os.  transfuse 1u pRBC  BP: 80/58 (05 Sep 2024 10:37) (78/63 - 99/67)  BP(mean): 67 (05 Sep 2024 10:37) (62 - 81)    SICU Progress note 9/6/2024   Went back to OR 9/5 for R hip IMN   EBL 600cc, intra op received 2u pRBC, 1 FFP, 1u plt  Pateint s/p IM nail with post op hypotension  partially related to acute blood loss anemia  bp now improved  BP: 118/70 (06 Sep 2024 08:00) (80/58 - 130/87)  BP(mean): 83 (06 Sep 2024 08:00) (58 - 86)    Adult-Hospitalist progress note 9/7/2024   R femur fracture s/p R Hip IMN complicated OR procedure due to hypotension and hemorrhage s/p 2 u PRBC

## 2024-09-23 NOTE — CHART NOTE - NSCHARTNOTEFT_GEN_A_CORE
Patient with episode of SVT on tele  Fever 101.9  Bp 120s  No chest pain  x1 lopressor ordered. Metoprolol PO increased as per cardiology recommendations. Dose to be given at 6am   c/w Tylenol PO for fever
Pt seen at bedside overnight. No acute complaints, pt denies any pain in R foot or calf at this time. Denies numbness, tingling, fevers, chills, CP, SOB, N/V/D.    Physical Exam:  General: NAD, pt laying in bed comfortably    RLE:  Dressings with strike through  Compartments soft, compressible  Calves nontender  No pain with passive stretch  Motor: firing EHL with mild dorsiflexion and plantarflexion seen  SILT: SPN, DPN, Tib, Saph, Shruthi  Unable to palpate PT/DP pulses    A/P:   68yMale with R proximal femur fracture, procedure aborted 2/2 patient instability 9/3/24  Explained to pt that swelling in his RLE is normal given his fracture. CTA RLE showed no active extravasation of contrast. Pt continues to remain asymptomatic. Will continue to monitor neurovascular status and obtain serial H/H.      Analgesia   NWB  Ice hip as tolerated
CDI note  Hypotension secondary to hemorrhage secondary to surgery  without shock

## 2024-09-24 DIAGNOSIS — I95.9 HYPOTENSION, UNSPECIFIED: ICD-10-CM

## 2024-09-24 DIAGNOSIS — S72.91XA UNSPECIFIED FRACTURE OF RIGHT FEMUR, INITIAL ENCOUNTER FOR CLOSED FRACTURE: ICD-10-CM
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
## 2024-09-24 DIAGNOSIS — I69.354 HEMIPLEGIA AND HEMIPARESIS FOLLOWING CEREBRAL INFARCTION AFFECTING LEFT NON-DOMINANT SIDE: ICD-10-CM

## 2024-09-24 DIAGNOSIS — I10 ESSENTIAL (PRIMARY) HYPERTENSION: ICD-10-CM

## 2024-09-24 DIAGNOSIS — I73.9 PERIPHERAL VASCULAR DISEASE, UNSPECIFIED: ICD-10-CM

## 2024-09-24 DIAGNOSIS — I25.10 ATHEROSCLEROTIC HEART DISEASE OF NATIVE CORONARY ARTERY WITHOUT ANGINA PECTORIS: ICD-10-CM

## 2024-09-24 DIAGNOSIS — E86.1 HYPOVOLEMIA: ICD-10-CM
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
## 2024-09-24 DIAGNOSIS — G92.8 OTHER TOXIC ENCEPHALOPATHY: ICD-10-CM
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
## 2024-09-24 DIAGNOSIS — Z99.3 DEPENDENCE ON WHEELCHAIR: ICD-10-CM

## 2024-09-24 DIAGNOSIS — E87.6 HYPOKALEMIA: ICD-10-CM

## 2024-09-24 DIAGNOSIS — T41.205A ADVERSE EFFECT OF UNSPECIFIED GENERAL ANESTHETICS, INITIAL ENCOUNTER: ICD-10-CM

## 2024-09-24 DIAGNOSIS — Z95.1 PRESENCE OF AORTOCORONARY BYPASS GRAFT: ICD-10-CM

## 2024-09-25 ENCOUNTER — EMERGENCY (EMERGENCY)
Facility: HOSPITAL | Age: 69
LOS: 0 days | Discharge: HOME | End: 2024-09-25
Attending: STUDENT IN AN ORGANIZED HEALTH CARE EDUCATION/TRAINING PROGRAM
Payer: MEDICAID

## 2024-09-25 VITALS
OXYGEN SATURATION: 98 % | RESPIRATION RATE: 16 BRPM | SYSTOLIC BLOOD PRESSURE: 115 MMHG | HEART RATE: 80 BPM | DIASTOLIC BLOOD PRESSURE: 84 MMHG | TEMPERATURE: 98 F

## 2024-09-25 VITALS
OXYGEN SATURATION: 96 % | HEART RATE: 63 BPM | RESPIRATION RATE: 17 BRPM | TEMPERATURE: 98 F | SYSTOLIC BLOOD PRESSURE: 115 MMHG | DIASTOLIC BLOOD PRESSURE: 74 MMHG

## 2024-09-25 NOTE — ED ADULT NURSE NOTE - CHIEF COMPLAINT QUOTE
Ambulance to ER from [facility] with c/o R hip pain. Patient fractured R hip x 2 weeks ago status post fall sent to ER for further imaging.

## 2024-09-25 NOTE — ED PROVIDER NOTE - PROGRESS NOTE DETAILS
Spoke with attending from [facility] - patient sent for x-ray imaging  for outpatient Ortho appointment tomorrow w/ Estefany. no other acute concerns. she is requesting to have x-ray imaging results sent back with patient to nursing home.  Spoke with Ortho no concern for change in x-rays since last admission which is under a different MRN.  Patient will be discharged back to nursing home right lower extremity neurovascularly intact

## 2024-09-25 NOTE — ED ADULT NURSE NOTE - NSFALLHARMRISKINTERV_ED_ALL_ED
Assistance OOB with selected safe patient handling equipment if applicable/Assistance with ambulation/Communicate risk of Fall with Harm to all staff, patient, and family/Monitor gait and stability/Provide visual cue: red socks, yellow wristband, yellow gown, etc/Reinforce activity limits and safety measures with patient and family/Bed in lowest position, wheels locked, appropriate side rails in place/Call bell, personal items and telephone in reach/Instruct patient to call for assistance before getting out of bed/chair/stretcher/Non-slip footwear applied when patient is off stretcher/[place] to call system/Physically safe environment - no spills, clutter or unnecessary equipment/Purposeful Proactive Rounding/Room/bathroom lighting operational, light cord in reach

## 2024-09-25 NOTE — ED ADULT NURSE NOTE - OBJECTIVE STATEMENT
Pt is a 68yr old male, A&OX3, poor historian, coming from [facility]. As per Hanh (RN from [facility]), pt needs a repeat x-ray of the R hip and R femur performed for his outpatient orthopedic appointment tomorrow. As per nursing home RN, pt has a confirmed R hip fracture s/p fall 2 weeks ago. Pt offers no complaints at this time. Pt in no acute distress.

## 2024-09-25 NOTE — ED PROVIDER NOTE - PHYSICAL EXAMINATION
Constitutional: NAD, alert, verbal  HEENT: NCAT, EOMi, PERRL  Cardiac: RRR no MRG  Resp: clear, no wheezing or crackles  GI: ab soft ntnd, no r/g  Ext: b/l lower extremities w/ pulse intact, sensation intact, SUAREZ

## 2024-09-25 NOTE — ED PROVIDER NOTE - NSFOLLOWUPINSTRUCTIONS_ED_ALL_ED_FT
Follow-up with Orthopedics tomorrow at your scheduled visit.     Return to the Emergency Department for worsening or persistent symptoms, and/or ANY NEW OR CONCERNING SYMPTOMS. If you have issues obtaining follow up, please call: [phone] or [phone]  to obtain a doctor or specialist who takes your insurance in your area.

## 2024-09-25 NOTE — ED ADULT TRIAGE NOTE - CHIEF COMPLAINT QUOTE
Ambulance to ER from [facility] with c/o R hip pain. Patient fractured R hip x 2 weeks ago status post fall sent to ER for further imaging.

## 2024-09-25 NOTE — ED PROVIDER NOTE - CLINICAL SUMMARY MEDICAL DECISION MAKING FREE TEXT BOX
69 y/o male with known right hip fracture (fall 2 weeks ago) sent in from NH for repeat xray imaging. No other acute concerns. vitals stable. Exam with b/l lower ext pulses, nvi.     xray femur  pain control as needed  discharge back to nh

## 2024-09-25 NOTE — ED PROVIDER NOTE - PATIENT PORTAL LINK FT
You can access the FollowMyHealth Patient Portal offered by [facility] by registering at the following website: [email or web address]. By joining [employer]’s FollowMyHealth portal, you will also be able to view your health information using other applications (apps) compatible with our system.

## 2024-10-03 ENCOUNTER — NON-APPOINTMENT (OUTPATIENT)
Age: 69
End: 2024-10-03

## 2024-10-03 ENCOUNTER — APPOINTMENT (OUTPATIENT)
Dept: CARDIOLOGY | Facility: CLINIC | Age: 69
End: 2024-10-03

## 2024-10-03 VITALS — SYSTOLIC BLOOD PRESSURE: 90 MMHG | OXYGEN SATURATION: 97 % | DIASTOLIC BLOOD PRESSURE: 60 MMHG | HEART RATE: 53 BPM

## 2024-10-03 DIAGNOSIS — Z00.00 ENCOUNTER FOR GENERAL ADULT MEDICAL EXAMINATION W/OUT ABNORMAL FINDINGS: ICD-10-CM

## 2024-10-03 PROCEDURE — 99214 OFFICE O/P EST MOD 30 MIN: CPT

## 2024-10-03 PROCEDURE — 93000 ELECTROCARDIOGRAM COMPLETE: CPT | Mod: 59

## 2024-10-03 PROCEDURE — G2211 COMPLEX E/M VISIT ADD ON: CPT

## 2024-10-03 RX ORDER — AA/HYDRO COLL,WHEY/ARG/C/ZN/CU 17G-100/30
LIQUID (ML) ORAL
Refills: 0 | Status: ACTIVE | COMMUNITY

## 2024-10-03 RX ORDER — FUROSEMIDE 20 MG/1
20 TABLET ORAL DAILY
Refills: 0 | Status: ACTIVE | COMMUNITY

## 2024-10-03 RX ORDER — POTASSIUM CHLORIDE 750 MG/1
10 TABLET, FILM COATED, EXTENDED RELEASE ORAL DAILY
Refills: 0 | Status: ACTIVE | COMMUNITY

## 2024-10-03 RX ORDER — ACETAMINOPHEN 325 MG/1
325 TABLET ORAL EVERY 6 HOURS
Refills: 0 | Status: ACTIVE | COMMUNITY

## 2024-10-03 RX ORDER — MAGNESIUM HYDROXIDE 400 MG/5ML
400 SUSPENSION ORAL
Refills: 0 | Status: ACTIVE | COMMUNITY

## 2024-10-03 RX ORDER — LORATADINE 5 MG
TABLET,CHEWABLE ORAL DAILY
Refills: 0 | Status: ACTIVE | COMMUNITY

## 2024-10-03 RX ORDER — GABAPENTIN 100 MG/1
100 CAPSULE ORAL TWICE DAILY
Refills: 0 | Status: ACTIVE | COMMUNITY

## 2024-10-03 RX ORDER — ATORVASTATIN CALCIUM 20 MG/1
20 TABLET, FILM COATED ORAL DAILY
Refills: 0 | Status: ACTIVE | COMMUNITY

## 2024-10-03 RX ORDER — GLUCOSAMINE HCL/CHONDROITIN SU 500-400 MG
3 CAPSULE ORAL DAILY
Refills: 0 | Status: ACTIVE | COMMUNITY

## 2024-10-03 RX ORDER — CLOPIDOGREL 75 MG/1
75 TABLET, FILM COATED ORAL DAILY
Refills: 0 | Status: ACTIVE | COMMUNITY

## 2024-10-03 RX ORDER — ENOXAPARIN SODIUM 40 MG/.4ML
40 INJECTION, SOLUTION SUBCUTANEOUS
Refills: 0 | Status: ACTIVE | COMMUNITY

## 2024-10-03 RX ORDER — SUCRALFATE 1 G/10ML
1 SUSPENSION ORAL
Refills: 0 | Status: ACTIVE | COMMUNITY

## 2024-10-03 RX ORDER — SENNOSIDES 8.6 MG/1
8.6 TABLET, COATED ORAL DAILY
Refills: 0 | Status: ACTIVE | COMMUNITY

## 2024-10-03 RX ORDER — METOPROLOL TARTRATE 25 MG/1
25 TABLET ORAL TWICE DAILY
Refills: 0 | Status: ACTIVE | COMMUNITY

## 2024-10-13 NOTE — HISTORY OF PRESENT ILLNESS
[FreeTextEntry1] :  The patient is a 68y Male complaining of hip pain/injury.  HPI Objective Statement: 69 y/o male with known right hip fracture (fall 2 weeks ago) sent in from NH for repeat xray imaging. Per patient MD at NH, patient was sent in for xray imaging for planned ortho visit tomorrow. No new falls or new complaints. NH unable to obtain xray at NH before visit. ROS otherwise neg.  on lovenox post hip surgery  not on xarelto while on Lovenox transition when cleared by orhto  CW BB Echo NL LV FX no recent Ischemic evaluation  spoke to LPN CBC/CMP/TSH  8.6/28.3 PCP Dr Morales attending Physician  no cardiologist    69 yo male with PMHx CVA residual left hemiplegia, contracted LE. s/p L AKA, CAD s/p CABG, PVD, lives in SNF, wheelchair bound. went [place] the mall with his family.  While on hoyar lift getting back into car his R leg was caught and caused excruciating pain causing him to become diaphoretic and vomiting Found to have  femur fractrue   62 yo Male , [facility] resident with hx of H/O DVT (deep venous thrombosis ,nearly  occlusive popliteal )on Xarelto ,Hyperlipidemia  ,Hypertension and PVD  (peripheral vascular disease) ,PAD ,LLE popliteal bypass ,s/p left 5th toe  amputation ,CVA in 1987 with L hemiparesis and contractures ,hx of CAD ,  Quadriple bypass , left foot toe avulsion , s/p LLE gangrene and L AKA  .  p/w  dark stools since this am ( as per nursing staff patient hasd a lot of melena  in a diaper ) . Pt on xarelto, took last dose last night. Pt also took his  plavix today. no weakness. no dizziness. no cp/sob/palp. no abd pain. no n/v.  no recent fall / trauma. no agg/allev factors. No other inj or co Founfd to  have anemia and blood transfusion of PRBCS is ordered ,seen by GI and EGD is  recommended Palliative care consult requested ,to discuss advance directives  and complete MOLST s/p attempted and abandoned IMN for Right proximal femur fracture.   Surgery was terminated as pt had irregular heart rhythm in OR SVT Bi/Trigem NL LV FX BB increased tele ordered today On BB Echo NL LV FX labs CMP/TSH/MG NL per LPN NL, + Anemia 8.6/28.3 PCP Dr Morales attending Physician due to hip surgery she will monitor this  ? Prior cardiac work up ? Ischemic eval  prior EKG prior cardiologist  ? stability of CBC [facility] 3:  [phone], MD # [phone]   [employer] SHERLYN Perea [phone], *Mirela [phone] * denies h/o CABG ? why on plavix ? stroke followed with neurologist  ? labs [facility]  stroke/PVD  arrythmia/Low BP simona herrera    9/11/in  ED  Right hip surgery   . Wound was closed and pt  brought to Step Down unit. He was stable on POD0 after the surgery.

## 2024-10-13 NOTE — HISTORY OF PRESENT ILLNESS
[FreeTextEntry1] :  The patient is a 68y Male complaining of hip pain/injury.  HPI Objective Statement: 67 y/o male with known right hip fracture (fall 2 weeks ago) sent in from NH for repeat xray imaging. Per patient MD at NH, patient was sent in for xray imaging for planned ortho visit tomorrow. No new falls or new complaints. NH unable to obtain xray at NH before visit. ROS otherwise neg.  on lovenox post hip surgery  not on xarelto while on Lovenox transition when cleared by orhto  CW BB Echo NL LV FX no recent Ischemic evaluation  spoke to LPN CBC/CMP/TSH  8.6/28.3 PCP Dr Morales attending Physician  no cardiologist    69 yo male with PMHx CVA residual left hemiplegia, contracted LE. s/p L AKA, CAD s/p CABG, PVD, lives in SNF, wheelchair bound. went [place] the mall with his family.  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  p/w  dark stools since this am ( as per nursing staff patient hasd a lot of melena  in a diaper ) . Pt on xarelto, took last dose last night. Pt also took his  plavix today. no weakness. no dizziness. no cp/sob/palp. no abd pain. no n/v.  no recent fall / trauma. no agg/allev factors. No other inj or co Founfd to  have anemia and blood transfusion of PRBCS is ordered ,seen by GI and EGD is  recommended Palliative care consult requested ,to discuss advance directives  and complete MOLST s/p attempted and abandoned IMN for Right proximal femur fracture.   Surgery was terminated as pt had irregular heart rhythm in OR SVT Bi/Trigem NL LV FX BB increased tele ordered today On BB Echo NL LV FX labs CMP/TSH/MG NL per LPN NL, + Anemia 8.6/28.3 PCP Dr Morales attending Physician due to hip surgery she will monitor this  ? Prior cardiac work up ? Ischemic eval  prior EKG prior cardiologist  ? stability of CBC [facility] 3:  [phone], MD # [phone]   [employer] SHERLYN Perea [phone], *Mirela -[phone] * denies h/o CABG ? why on plavix ? stroke followed with neurologist  ? labs [facility]  stroke/PVD  arrythmia/Low BP simona herrera    9/11/in  ED  Right hip surgery   . Wound was closed and pt  brought to Step Down unit. He was stable on POD0 after the surgery.

## 2024-10-13 NOTE — HISTORY OF PRESENT ILLNESS
[FreeTextEntry1] :  The patient is a 68y Male complaining of hip pain/injury.  HPI Objective Statement: 67 y/o male with known right hip fracture (fall 2 weeks ago) sent in from NH for repeat xray imaging. Per patient MD at NH, patient was sent in for xray imaging for planned ortho visit tomorrow. No new falls or new complaints. NH unable to obtain xray at NH before visit. ROS otherwise neg.  on lovenox post hip surgery  not on xarelto while on Lovenox transition when cleared by orhto  CW BB Echo NL LV FX no recent Ischemic evaluation  spoke to LPN CBC/CMP/TSH  8.6/28.3 PCP Dr Morales attending Physician  no cardiologist    69 yo male with PMHx CVA residual left hemiplegia, contracted LE. s/p L AKA, CAD s/p CABG, PVD, lives in SNF, wheelchair bound. went to the mall with his family.  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  p/w  dark stools since this am ( as per nursing staff patient hasd a lot of melena  in a diaper ) . Pt on xarelto, took last dose last night. Pt also took his  plavix today. no weakness. no dizziness. no cp/sob/palp. no abd pain. no n/v.  no recent fall / trauma. no agg/allev factors. No other inj or co Founfd to  have anemia and blood transfusion of PRBCS is ordered ,seen by GI and EGD is  recommended Palliative care consult requested ,to discuss advance directives  and complete MOLST s/p attempted and abandoned IMN for Right proximal femur fracture.   Surgery was terminated as pt had irregular heart rhythm in OR SVT Bi/Trigem NL LV FX BB increased tele ordered today On BB Echo NL LV FX labs CMP/TSH/MG NL per LPN NL, + Anemia 8.6/28.3 PCP Dr Morales attending Physician due to hip surgery she will monitor this  ? Prior cardiac work up ? Ischemic eval  prior EKG prior cardiologist  ? stability of CBC [facility] 3:  [phone], MD # [phone]   [employer] SHERLYN Perea [phone], *Mirela [phone] * denies h/o CABG ? why on plavix ? stroke followed with neurologist  ? labs [facility]  stroke/PVD  arrythmia/Low BP simona herrera    9/11/in  ED  Right hip surgery   . Wound was closed and pt  brought to Step Down unit. He was stable on POD0 after the surgery.

## 2024-10-13 NOTE — HISTORY OF PRESENT ILLNESS
[FreeTextEntry1] :  The patient is a 68y Male complaining of hip pain/injury.  HPI Objective Statement: 67 y/o male with known right hip fracture (fall 2 weeks ago) sent in from [facility] for repeat xray imaging. Per patient MD at NH, patient was sent in for xray imaging for planned ortho visit tomorrow. No new falls or new complaints. NH unable to obtain xray at NH before visit. ROS otherwise neg.  on lovenox post hip surgery  not on xarelto while on Lovenox transition when cleared by orhto  CW BB Echo NL LV FX no recent Ischemic evaluation  spoke to LPN CBC/CMP/TSH  8.6/28.3 PCP Dr Morales attending Physician  no cardiologist    69 yo male with PMHx CVA residual left hemiplegia, contracted LE. s/p L AKA, CAD s/p CABG, PVD, lives in SNF, wheelchair bound. went [place] the mall with his family.  While on hoyar lift getting back into car his R leg was caught and caused excruciating pain causing him to become diaphoretic and vomiting Found to have  femur fractrue   64 yo Male , Critical access hospital resident with hx of H/O DVT (deep venous thrombosis ,nearly  occlusive popliteal )on Xarelto ,Hyperlipidemia  ,Hypertension and PVD  (peripheral vascular disease) ,PAD ,LLE popliteal bypass ,s/p left 5th toe  amputation ,CVA in 1987 with L hemiparesis and contractures ,hx of CAD ,  Quadriple bypass , left foot toe avulsion , s/p LLE gangrene and L AKA  .  p/w  dark stools since this am ( as per nursing staff patient hasd a lot of melena  in a diaper ) . Pt on xarelto, took last dose last night. Pt also took his  plavix today. no weakness. no dizziness. no cp/sob/palp. no abd pain. no n/v.  no recent fall / trauma. no agg/allev factors. No other inj or co Founfd to  have anemia and blood transfusion of PRBCS is ordered ,seen by GI and EGD is  recommended Palliative care consult requested ,to discuss advance directives  and complete MOLST s/p attempted and abandoned IMN for Right proximal femur fracture.   Surgery was terminated as pt had irregular heart rhythm in OR SVT Bi/Trigem NL LV FX BB increased tele ordered today On BB Echo NL LV FX labs CMP/TSH/MG NL per LPN NL, + Anemia 8.6/28.3 PCP Dr Morales attending Physician due to hip surgery she will monitor this  ? Prior cardiac work up ? Ischemic eval  prior EKG prior cardiologist  ? stability of CBC [facility]:  [phone], MD # [phone]   [facility]LYN Perea [phone], *Mirela [phone] * denies h/o CABG ? why on plavix ? stroke followed with neurologist  ? labs [facility]  stroke/PVD  arrythmia/Low BP simona herrera    9/11/in  ED  Right hip surgery   . Wound was closed and pt  brought to Step Down unit. He was stable on POD0 after the surgery.

## 2024-10-15 PROBLEM — Z95.1 S/P CABG (CORONARY ARTERY BYPASS GRAFT): Status: ACTIVE | Noted: 2024-10-14

## 2024-10-15 PROBLEM — R94.31 ABNORMAL EKG: Status: ACTIVE | Noted: 2024-10-14

## 2024-10-15 PROBLEM — Z09 HOSPITAL DISCHARGE FOLLOW-UP: Status: ACTIVE | Noted: 2024-10-15

## 2024-12-05 ENCOUNTER — APPOINTMENT (OUTPATIENT)
Dept: CARDIOLOGY | Facility: CLINIC | Age: 69
End: 2024-12-05

## 2024-12-09 ENCOUNTER — APPOINTMENT (OUTPATIENT)
Dept: CARDIOLOGY | Facility: CLINIC | Age: 69
End: 2024-12-09

## 2025-04-02 NOTE — ED ADULT NURSE NOTE - AS PAIN REST
Specialty Pharmacy Patient Management Program  Refill Outreach     Molly was contacted today regarding refills of their medication(s).    Refill Questions      Flowsheet Row Most Recent Value   Changes to allergies? No   Changes to medications? No   New conditions or infections since last clinic visit No   Unplanned office visit, urgent care, ED, or hospital admission in the last 4 weeks  No   How does patient/caregiver feel medication is working? Good   Financial problems or insurance changes  No   Since the previous refill, were any specialty medication doses or scheduled injections missed or delayed?  No   Does this patient require a clinical escalation to a pharmacist? No            Delivery Questions      Flowsheet Row Most Recent Value   Delivery method [employer]   Delivery address verified with patient/caregiver? Yes   Delivery address Home   Number of medications in delivery 1   Medication(s) being filled and delivered [facility] ([facility]ul[facility])   Doses left of specialty medications 5   Copay verified? Yes   Copay amount $0   Copay form of payment No copayment ($0)   Delivery Date Selection 04/03/25   Signature Required No                 Follow-up: 28 day(s)     Al Bearden, Pharmacy Technician  4/2/2025  11:08 EDT    
5 (moderate pain)